# Patient Record
Sex: FEMALE | Race: OTHER | Employment: UNEMPLOYED | URBAN - METROPOLITAN AREA
[De-identification: names, ages, dates, MRNs, and addresses within clinical notes are randomized per-mention and may not be internally consistent; named-entity substitution may affect disease eponyms.]

---

## 2019-03-29 ENCOUNTER — LAB ENCOUNTER (OUTPATIENT)
Dept: LAB | Facility: HOSPITAL | Age: 49
End: 2019-03-29
Attending: ORTHOPAEDIC SURGERY
Payer: COMMERCIAL

## 2019-03-29 DIAGNOSIS — Z01.818 PREOPERATIVE TESTING: ICD-10-CM

## 2019-03-29 LAB
ANTIBODY SCREEN: NEGATIVE
RH BLOOD TYPE: POSITIVE

## 2019-03-29 PROCEDURE — 86901 BLOOD TYPING SEROLOGIC RH(D): CPT

## 2019-03-29 PROCEDURE — 86850 RBC ANTIBODY SCREEN: CPT

## 2019-03-29 PROCEDURE — 36415 COLL VENOUS BLD VENIPUNCTURE: CPT

## 2019-03-29 PROCEDURE — 86900 BLOOD TYPING SEROLOGIC ABO: CPT

## 2019-04-04 ENCOUNTER — HOSPITAL ENCOUNTER (INPATIENT)
Facility: HOSPITAL | Age: 49
LOS: 3 days | Discharge: HOME HEALTH | DRG: 470 | End: 2019-04-07
Attending: ORTHOPAEDIC SURGERY | Admitting: ORTHOPAEDIC SURGERY
Payer: COMMERCIAL

## 2019-04-04 ENCOUNTER — ANESTHESIA EVENT (OUTPATIENT)
Dept: SURGERY | Facility: HOSPITAL | Age: 49
DRG: 470 | End: 2019-04-04
Payer: COMMERCIAL

## 2019-04-04 ENCOUNTER — APPOINTMENT (OUTPATIENT)
Dept: GENERAL RADIOLOGY | Facility: HOSPITAL | Age: 49
DRG: 470 | End: 2019-04-04
Attending: NURSE PRACTITIONER
Payer: COMMERCIAL

## 2019-04-04 ENCOUNTER — ANESTHESIA (OUTPATIENT)
Dept: SURGERY | Facility: HOSPITAL | Age: 49
DRG: 470 | End: 2019-04-04
Payer: COMMERCIAL

## 2019-04-04 DIAGNOSIS — Z01.818 PREOPERATIVE TESTING: Primary | ICD-10-CM

## 2019-04-04 PROBLEM — E66.01 MORBID OBESITY WITH BMI OF 45.0-49.9, ADULT (HCC): Chronic | Status: ACTIVE | Noted: 2019-04-04

## 2019-04-04 PROBLEM — M17.11 ARTHRITIS OF RIGHT KNEE: Status: ACTIVE | Noted: 2019-04-04

## 2019-04-04 PROCEDURE — 73560 X-RAY EXAM OF KNEE 1 OR 2: CPT | Performed by: NURSE PRACTITIONER

## 2019-04-04 PROCEDURE — 99232 SBSQ HOSP IP/OBS MODERATE 35: CPT | Performed by: HOSPITALIST

## 2019-04-04 PROCEDURE — 0SRC0J9 REPLACEMENT OF RIGHT KNEE JOINT WITH SYNTHETIC SUBSTITUTE, CEMENTED, OPEN APPROACH: ICD-10-PCS | Performed by: ORTHOPAEDIC SURGERY

## 2019-04-04 DEVICE — INSERT TIB 5 12MM STRL KNEE R: Type: IMPLANTABLE DEVICE | Site: KNEE | Status: FUNCTIONAL

## 2019-04-04 DEVICE — COMP PAT DOME 32MM 8MM STRL: Type: IMPLANTABLE DEVICE | Site: KNEE | Status: FUNCTIONAL

## 2019-04-04 DEVICE — COMP FEM 5 STRL KNEE R NPOR: Type: IMPLANTABLE DEVICE | Site: KNEE | Status: FUNCTIONAL

## 2019-04-04 DEVICE — BP TIB 5- STRL KNEE R NPOR DJO: Type: IMPLANTABLE DEVICE | Site: KNEE | Status: FUNCTIONAL

## 2019-04-04 DEVICE — BIOMET BC R 1X40 US: Type: IMPLANTABLE DEVICE | Site: KNEE | Status: FUNCTIONAL

## 2019-04-04 DEVICE — TKA CAP, PRIMARY W/O STEM EXT: Type: IMPLANTABLE DEVICE | Status: FUNCTIONAL

## 2019-04-04 RX ORDER — DEXAMETHASONE SODIUM PHOSPHATE 4 MG/ML
VIAL (ML) INJECTION AS NEEDED
Status: DISCONTINUED | OUTPATIENT
Start: 2019-04-04 | End: 2019-04-04 | Stop reason: SURG

## 2019-04-04 RX ORDER — CELECOXIB 200 MG/1
200 CAPSULE ORAL EVERY 12 HOURS
Status: DISCONTINUED | OUTPATIENT
Start: 2019-04-05 | End: 2019-04-07

## 2019-04-04 RX ORDER — HALOPERIDOL 5 MG/ML
0.5 INJECTION INTRAMUSCULAR ONCE AS NEEDED
Status: ACTIVE | OUTPATIENT
Start: 2019-04-04 | End: 2019-04-04

## 2019-04-04 RX ORDER — FAMOTIDINE 20 MG/1
20 TABLET ORAL ONCE
Status: COMPLETED | OUTPATIENT
Start: 2019-04-04 | End: 2019-04-04

## 2019-04-04 RX ORDER — KETOROLAC TROMETHAMINE 30 MG/ML
30 INJECTION, SOLUTION INTRAMUSCULAR; INTRAVENOUS ONCE
Status: DISCONTINUED | OUTPATIENT
Start: 2019-04-04 | End: 2019-04-07

## 2019-04-04 RX ORDER — ACETAMINOPHEN 325 MG/1
650 TABLET ORAL EVERY 6 HOURS PRN
Status: DISCONTINUED | OUTPATIENT
Start: 2019-04-04 | End: 2019-04-07

## 2019-04-04 RX ORDER — ONDANSETRON 2 MG/ML
4 INJECTION INTRAMUSCULAR; INTRAVENOUS EVERY 4 HOURS PRN
Status: DISCONTINUED | OUTPATIENT
Start: 2019-04-04 | End: 2019-04-07

## 2019-04-04 RX ORDER — METOCLOPRAMIDE 10 MG/1
10 TABLET ORAL ONCE
Status: COMPLETED | OUTPATIENT
Start: 2019-04-04 | End: 2019-04-04

## 2019-04-04 RX ORDER — OXYCODONE HCL 10 MG/1
10 TABLET, FILM COATED, EXTENDED RELEASE ORAL ONCE
Status: COMPLETED | OUTPATIENT
Start: 2019-04-04 | End: 2019-04-04

## 2019-04-04 RX ORDER — POLYETHYLENE GLYCOL 3350 17 G/17G
17 POWDER, FOR SOLUTION ORAL DAILY PRN
Status: DISCONTINUED | OUTPATIENT
Start: 2019-04-04 | End: 2019-04-07

## 2019-04-04 RX ORDER — MAGNESIUM HYDROXIDE 1200 MG/15ML
LIQUID ORAL CONTINUOUS PRN
Status: COMPLETED | OUTPATIENT
Start: 2019-04-04 | End: 2019-04-04

## 2019-04-04 RX ORDER — HYDROCODONE BITARTRATE AND ACETAMINOPHEN 7.5; 325 MG/1; MG/1
1 TABLET ORAL EVERY 6 HOURS PRN
Status: DISCONTINUED | OUTPATIENT
Start: 2019-04-04 | End: 2019-04-07

## 2019-04-04 RX ORDER — ZOLPIDEM TARTRATE 5 MG/1
5 TABLET ORAL NIGHTLY PRN
Status: DISCONTINUED | OUTPATIENT
Start: 2019-04-04 | End: 2019-04-07

## 2019-04-04 RX ORDER — CEFAZOLIN SODIUM/WATER 2 G/20 ML
2 SYRINGE (ML) INTRAVENOUS EVERY 8 HOURS
Status: COMPLETED | OUTPATIENT
Start: 2019-04-04 | End: 2019-04-05

## 2019-04-04 RX ORDER — HYDROCODONE BITARTRATE AND ACETAMINOPHEN 5; 325 MG/1; MG/1
1 TABLET ORAL AS NEEDED
Status: DISCONTINUED | OUTPATIENT
Start: 2019-04-04 | End: 2019-04-04 | Stop reason: HOSPADM

## 2019-04-04 RX ORDER — HYDROCODONE BITARTRATE AND ACETAMINOPHEN 10; 325 MG/1; MG/1
1 TABLET ORAL EVERY 4 HOURS PRN
Status: DISCONTINUED | OUTPATIENT
Start: 2019-04-04 | End: 2019-04-07

## 2019-04-04 RX ORDER — MORPHINE SULFATE 2 MG/ML
2 INJECTION, SOLUTION INTRAMUSCULAR; INTRAVENOUS EVERY 2 HOUR PRN
Status: DISCONTINUED | OUTPATIENT
Start: 2019-04-04 | End: 2019-04-07

## 2019-04-04 RX ORDER — METOCLOPRAMIDE HYDROCHLORIDE 5 MG/ML
10 INJECTION INTRAMUSCULAR; INTRAVENOUS EVERY 6 HOURS PRN
Status: ACTIVE | OUTPATIENT
Start: 2019-04-04 | End: 2019-04-06

## 2019-04-04 RX ORDER — HYDROMORPHONE HYDROCHLORIDE 1 MG/ML
0.4 INJECTION, SOLUTION INTRAMUSCULAR; INTRAVENOUS; SUBCUTANEOUS
Status: ACTIVE | OUTPATIENT
Start: 2019-04-04 | End: 2019-04-05

## 2019-04-04 RX ORDER — DIPHENHYDRAMINE HYDROCHLORIDE 50 MG/ML
12.5 INJECTION INTRAMUSCULAR; INTRAVENOUS EVERY 4 HOURS PRN
Status: DISCONTINUED | OUTPATIENT
Start: 2019-04-04 | End: 2019-04-07

## 2019-04-04 RX ORDER — HYDROCODONE BITARTRATE AND ACETAMINOPHEN 5; 325 MG/1; MG/1
2 TABLET ORAL AS NEEDED
Status: DISCONTINUED | OUTPATIENT
Start: 2019-04-04 | End: 2019-04-04 | Stop reason: HOSPADM

## 2019-04-04 RX ORDER — BUPIVACAINE HYDROCHLORIDE 7.5 MG/ML
INJECTION, SOLUTION INTRASPINAL AS NEEDED
Status: DISCONTINUED | OUTPATIENT
Start: 2019-04-04 | End: 2019-04-04 | Stop reason: SURG

## 2019-04-04 RX ORDER — DIPHENHYDRAMINE HCL 25 MG
25 CAPSULE ORAL EVERY 4 HOURS PRN
Status: DISCONTINUED | OUTPATIENT
Start: 2019-04-04 | End: 2019-04-07

## 2019-04-04 RX ORDER — ACETAMINOPHEN 500 MG
1000 TABLET ORAL ONCE
Status: COMPLETED | OUTPATIENT
Start: 2019-04-04 | End: 2019-04-04

## 2019-04-04 RX ORDER — SODIUM PHOSPHATE, DIBASIC AND SODIUM PHOSPHATE, MONOBASIC 7; 19 G/133ML; G/133ML
1 ENEMA RECTAL ONCE AS NEEDED
Status: DISCONTINUED | OUTPATIENT
Start: 2019-04-04 | End: 2019-04-07

## 2019-04-04 RX ORDER — ONDANSETRON 2 MG/ML
INJECTION INTRAMUSCULAR; INTRAVENOUS AS NEEDED
Status: DISCONTINUED | OUTPATIENT
Start: 2019-04-04 | End: 2019-04-04 | Stop reason: SURG

## 2019-04-04 RX ORDER — NALOXONE HYDROCHLORIDE 0.4 MG/ML
0.08 INJECTION, SOLUTION INTRAMUSCULAR; INTRAVENOUS; SUBCUTANEOUS
Status: ACTIVE | OUTPATIENT
Start: 2019-04-04 | End: 2019-04-05

## 2019-04-04 RX ORDER — MORPHINE SULFATE 4 MG/ML
4 INJECTION, SOLUTION INTRAMUSCULAR; INTRAVENOUS EVERY 2 HOUR PRN
Status: DISCONTINUED | OUTPATIENT
Start: 2019-04-04 | End: 2019-04-07

## 2019-04-04 RX ORDER — SODIUM CHLORIDE 9 MG/ML
INJECTION, SOLUTION INTRAVENOUS CONTINUOUS
Status: DISCONTINUED | OUTPATIENT
Start: 2019-04-04 | End: 2019-04-06

## 2019-04-04 RX ORDER — MIDAZOLAM HYDROCHLORIDE 1 MG/ML
INJECTION INTRAMUSCULAR; INTRAVENOUS AS NEEDED
Status: DISCONTINUED | OUTPATIENT
Start: 2019-04-04 | End: 2019-04-04 | Stop reason: SURG

## 2019-04-04 RX ORDER — MORPHINE SULFATE 10 MG/ML
6 INJECTION, SOLUTION INTRAMUSCULAR; INTRAVENOUS EVERY 10 MIN PRN
Status: DISCONTINUED | OUTPATIENT
Start: 2019-04-04 | End: 2019-04-04 | Stop reason: HOSPADM

## 2019-04-04 RX ORDER — SODIUM CHLORIDE, SODIUM LACTATE, POTASSIUM CHLORIDE, CALCIUM CHLORIDE 600; 310; 30; 20 MG/100ML; MG/100ML; MG/100ML; MG/100ML
INJECTION, SOLUTION INTRAVENOUS CONTINUOUS
Status: DISCONTINUED | OUTPATIENT
Start: 2019-04-04 | End: 2019-04-06

## 2019-04-04 RX ORDER — HYDROMORPHONE HYDROCHLORIDE 1 MG/ML
0.6 INJECTION, SOLUTION INTRAMUSCULAR; INTRAVENOUS; SUBCUTANEOUS
Status: ACTIVE | OUTPATIENT
Start: 2019-04-04 | End: 2019-04-05

## 2019-04-04 RX ORDER — CEFAZOLIN SODIUM/WATER 2 G/20 ML
2 SYRINGE (ML) INTRAVENOUS ONCE
Status: COMPLETED | OUTPATIENT
Start: 2019-04-04 | End: 2019-04-04

## 2019-04-04 RX ORDER — SODIUM CHLORIDE 0.9 % (FLUSH) 0.9 %
10 SYRINGE (ML) INJECTION AS NEEDED
Status: DISCONTINUED | OUTPATIENT
Start: 2019-04-04 | End: 2019-04-07

## 2019-04-04 RX ORDER — NALOXONE HYDROCHLORIDE 0.4 MG/ML
80 INJECTION, SOLUTION INTRAMUSCULAR; INTRAVENOUS; SUBCUTANEOUS AS NEEDED
Status: DISCONTINUED | OUTPATIENT
Start: 2019-04-04 | End: 2019-04-04 | Stop reason: HOSPADM

## 2019-04-04 RX ORDER — HYDROCODONE BITARTRATE AND ACETAMINOPHEN 5; 325 MG/1; MG/1
1 TABLET ORAL EVERY 4 HOURS PRN
Status: DISCONTINUED | OUTPATIENT
Start: 2019-04-04 | End: 2019-04-07

## 2019-04-04 RX ORDER — OXYCODONE HCL 10 MG/1
10 TABLET, FILM COATED, EXTENDED RELEASE ORAL EVERY 12 HOURS
Status: DISCONTINUED | OUTPATIENT
Start: 2019-04-04 | End: 2019-04-07

## 2019-04-04 RX ORDER — MORPHINE SULFATE 2 MG/ML
1 INJECTION, SOLUTION INTRAMUSCULAR; INTRAVENOUS EVERY 2 HOUR PRN
Status: DISCONTINUED | OUTPATIENT
Start: 2019-04-04 | End: 2019-04-07

## 2019-04-04 RX ORDER — FAMOTIDINE 20 MG/1
20 TABLET ORAL 2 TIMES DAILY
Status: DISCONTINUED | OUTPATIENT
Start: 2019-04-04 | End: 2019-04-07

## 2019-04-04 RX ORDER — SENNOSIDES 8.6 MG
17.2 TABLET ORAL 2 TIMES DAILY
Status: DISCONTINUED | OUTPATIENT
Start: 2019-04-04 | End: 2019-04-07

## 2019-04-04 RX ORDER — SODIUM CHLORIDE, SODIUM LACTATE, POTASSIUM CHLORIDE, CALCIUM CHLORIDE 600; 310; 30; 20 MG/100ML; MG/100ML; MG/100ML; MG/100ML
INJECTION, SOLUTION INTRAVENOUS CONTINUOUS
Status: DISCONTINUED | OUTPATIENT
Start: 2019-04-04 | End: 2019-04-04 | Stop reason: HOSPADM

## 2019-04-04 RX ORDER — HYDROCODONE BITARTRATE AND ACETAMINOPHEN 10; 325 MG/1; MG/1
2 TABLET ORAL EVERY 4 HOURS PRN
Status: DISCONTINUED | OUTPATIENT
Start: 2019-04-04 | End: 2019-04-07

## 2019-04-04 RX ORDER — HYDROCODONE BITARTRATE AND ACETAMINOPHEN 7.5; 325 MG/1; MG/1
2 TABLET ORAL EVERY 6 HOURS PRN
Status: DISCONTINUED | OUTPATIENT
Start: 2019-04-04 | End: 2019-04-07

## 2019-04-04 RX ORDER — FAMOTIDINE 10 MG/ML
20 INJECTION, SOLUTION INTRAVENOUS 2 TIMES DAILY
Status: DISCONTINUED | OUTPATIENT
Start: 2019-04-04 | End: 2019-04-07

## 2019-04-04 RX ORDER — HALOPERIDOL 5 MG/ML
0.25 INJECTION INTRAMUSCULAR ONCE AS NEEDED
Status: DISCONTINUED | OUTPATIENT
Start: 2019-04-04 | End: 2019-04-04 | Stop reason: HOSPADM

## 2019-04-04 RX ORDER — SCOLOPAMINE TRANSDERMAL SYSTEM 1 MG/1
1 PATCH, EXTENDED RELEASE TRANSDERMAL ONCE
Status: COMPLETED | OUTPATIENT
Start: 2019-04-04 | End: 2019-04-07

## 2019-04-04 RX ORDER — MORPHINE SULFATE 4 MG/ML
4 INJECTION, SOLUTION INTRAMUSCULAR; INTRAVENOUS EVERY 10 MIN PRN
Status: DISCONTINUED | OUTPATIENT
Start: 2019-04-04 | End: 2019-04-04 | Stop reason: HOSPADM

## 2019-04-04 RX ORDER — LIDOCAINE HYDROCHLORIDE 10 MG/ML
INJECTION, SOLUTION EPIDURAL; INFILTRATION; INTRACAUDAL; PERINEURAL AS NEEDED
Status: DISCONTINUED | OUTPATIENT
Start: 2019-04-04 | End: 2019-04-04 | Stop reason: SURG

## 2019-04-04 RX ORDER — ONDANSETRON 2 MG/ML
4 INJECTION INTRAMUSCULAR; INTRAVENOUS ONCE AS NEEDED
Status: DISCONTINUED | OUTPATIENT
Start: 2019-04-04 | End: 2019-04-04 | Stop reason: HOSPADM

## 2019-04-04 RX ORDER — DIPHENHYDRAMINE HCL 25 MG
25 CAPSULE ORAL EVERY 4 HOURS PRN
Status: DISPENSED | OUTPATIENT
Start: 2019-04-04 | End: 2019-04-05

## 2019-04-04 RX ORDER — MORPHINE SULFATE 2 MG/ML
2 INJECTION, SOLUTION INTRAMUSCULAR; INTRAVENOUS EVERY 10 MIN PRN
Status: DISCONTINUED | OUTPATIENT
Start: 2019-04-04 | End: 2019-04-04 | Stop reason: HOSPADM

## 2019-04-04 RX ORDER — BISACODYL 10 MG
10 SUPPOSITORY, RECTAL RECTAL
Status: DISCONTINUED | OUTPATIENT
Start: 2019-04-04 | End: 2019-04-07

## 2019-04-04 RX ORDER — NALBUPHINE HCL 10 MG/ML
2.5 AMPUL (ML) INJECTION EVERY 4 HOURS PRN
Status: DISCONTINUED | OUTPATIENT
Start: 2019-04-04 | End: 2019-04-07

## 2019-04-04 RX ORDER — ONDANSETRON 2 MG/ML
4 INJECTION INTRAMUSCULAR; INTRAVENOUS ONCE AS NEEDED
Status: COMPLETED | OUTPATIENT
Start: 2019-04-04 | End: 2019-04-04

## 2019-04-04 RX ORDER — CELECOXIB 200 MG/1
200 CAPSULE ORAL ONCE
Status: COMPLETED | OUTPATIENT
Start: 2019-04-04 | End: 2019-04-04

## 2019-04-04 RX ORDER — DIPHENHYDRAMINE HYDROCHLORIDE 50 MG/ML
12.5 INJECTION INTRAMUSCULAR; INTRAVENOUS EVERY 4 HOURS PRN
Status: ACTIVE | OUTPATIENT
Start: 2019-04-04 | End: 2019-04-05

## 2019-04-04 RX ORDER — BUPIVACAINE HYDROCHLORIDE 2.5 MG/ML
INJECTION, SOLUTION EPIDURAL; INFILTRATION; INTRACAUDAL AS NEEDED
Status: DISCONTINUED | OUTPATIENT
Start: 2019-04-04 | End: 2019-04-04

## 2019-04-04 RX ORDER — MORPHINE SULFATE 1 MG/ML
INJECTION, SOLUTION EPIDURAL; INTRATHECAL; INTRAVENOUS AS NEEDED
Status: DISCONTINUED | OUTPATIENT
Start: 2019-04-04 | End: 2019-04-04 | Stop reason: SURG

## 2019-04-04 RX ADMIN — LIDOCAINE HYDROCHLORIDE 50 MG: 10 INJECTION, SOLUTION EPIDURAL; INFILTRATION; INTRACAUDAL; PERINEURAL at 08:05:00

## 2019-04-04 RX ADMIN — SODIUM CHLORIDE, SODIUM LACTATE, POTASSIUM CHLORIDE, CALCIUM CHLORIDE: 600; 310; 30; 20 INJECTION, SOLUTION INTRAVENOUS at 07:44:00

## 2019-04-04 RX ADMIN — MORPHINE SULFATE 0.3 MG: 1 INJECTION, SOLUTION EPIDURAL; INTRATHECAL; INTRAVENOUS at 08:04:00

## 2019-04-04 RX ADMIN — CEFAZOLIN SODIUM/WATER 2 G: 2 G/20 ML SYRINGE (ML) INTRAVENOUS at 08:18:00

## 2019-04-04 RX ADMIN — MIDAZOLAM HYDROCHLORIDE 2 MG: 1 INJECTION INTRAMUSCULAR; INTRAVENOUS at 07:42:00

## 2019-04-04 RX ADMIN — LIDOCAINE HYDROCHLORIDE 50 MG: 10 INJECTION, SOLUTION EPIDURAL; INFILTRATION; INTRACAUDAL; PERINEURAL at 07:53:00

## 2019-04-04 RX ADMIN — BUPIVACAINE HYDROCHLORIDE 1.5 ML: 7.5 INJECTION, SOLUTION INTRASPINAL at 08:04:00

## 2019-04-04 RX ADMIN — ONDANSETRON 4 MG: 2 INJECTION INTRAMUSCULAR; INTRAVENOUS at 08:22:00

## 2019-04-04 RX ADMIN — DEXAMETHASONE SODIUM PHOSPHATE 8 MG: 4 MG/ML VIAL (ML) INJECTION at 08:22:00

## 2019-04-04 RX ADMIN — SODIUM CHLORIDE, SODIUM LACTATE, POTASSIUM CHLORIDE, CALCIUM CHLORIDE: 600; 310; 30; 20 INJECTION, SOLUTION INTRAVENOUS at 09:46:00

## 2019-04-04 NOTE — ANESTHESIA PREPROCEDURE EVALUATION
Anesthesia PreOp Note    HPI:     Cee Shaffer is a 47year old female who presents for preoperative consultation requested by: Manjit Colin MD    Date of Surgery: 4/4/2019    Procedure(s):  KNEE TOTAL REPLACEMENT  Indication: degenerative nikita resource strain: Not on file      Food insecurity:        Worry: Not on file        Inability: Not on file      Transportation needs:        Medical: Not on file        Non-medical: Not on file    Tobacco Use      Smoking status: Never Smoker      Smokeles tolerance: good    Neuro/Psych - negative ROS     GI/Hepatic/Renal - negative ROS     Endo/Other - negative ROS   Abdominal              Anesthesia Plan:   ASA:  2  Plan:   Spinal  Post-op Pain Management: Intrathecal narcotics      I have informed Tarah Garcia

## 2019-04-04 NOTE — PLAN OF CARE
MUSCULOSKELETAL - ADULT    • Return mobility to safest level of function Progressing        PAIN - ADULT    • Verbalizes/displays adequate comfort level or patient's stated pain goal Progressing        Patient Centered Care    • Patient preferences are wenceslao

## 2019-04-04 NOTE — PROGRESS NOTES
[facility] [facility]    Progress Note    Marija Mchugh Patient Status:  Inpatient    3/25/1970 MRN [id number]   Location [facility] 4W/SW/SE Attending Reinaldo Ramachandran MD   Hosp Day # 0 PCP No primary care provider on file. RESP AND HEMODYNAMIC STATUS.              Results:   No results found for: WBC, HGB, HCT, PLT, CREATSERUM, BUN, NA, K, CL, CO2, GLU, CA, ALB, ALKPHO, BILT, TP, AST, ALT, PTT, INR, PT, T4F, TSH, TSHREFLEX, MARIA, LIP, GGT, PSA, DDIMER, ESRML, ESRPF, CRP, BNP,

## 2019-04-04 NOTE — ANESTHESIA PROCEDURE NOTES
Spinal Block  Performed by: Tre Mancini, CRNA  Authorized by: Christina Anderson MD     Patient Location:  OR  Start Time:  4/4/2019 7:53 AM  End Time:  4/4/2019 8:04 AM  Site identification: surface landmarks    Reason for Block: at surgeon's re

## 2019-04-04 NOTE — PHYSICAL THERAPY NOTE
PHYSICAL THERAPY KNEE EVALUATION - INPATIENT       Room Number: 407/407-A  Evaluation Date: 4/4/2019  Type of Evaluation: Initial  Physician Order: PT Eval and Treat    Presenting Problem: s/p R TKA  Reason for Therapy: Mobility Dysfunction and Discharge P in preparation for discharge. DISCHARGE RECOMMENDATIONS  PT Discharge Recommendations: 24 hour care/supervision;Home with home health PT;Sub-acute rehabilitation    PLAN  PT Treatment Plan: Bed mobility; Body mechanics; Endurance; Energy conservation;Patie strength are within functional limits     Lower extremity ROM is within functional limits except for the following:   Right Knee extension ~5 deg  Right Knee flexion ~85 deg    Lower extremity strength is within functional limits     BALANCE  Static Sittin walker at [facility], verbal cues for proper hand placement     Exercise/Education Provided:  Bed mobility  Body mechanics  Energy conservation  Gait training  ROM  Lower therapeutic exercise:   Ankle pumps  Heel slides  Knee extension  Quad sets  Transfer trainin

## 2019-04-04 NOTE — OPERATIVE REPORT
[place] [facility] - [facility]    TKA Brief Operative Note    Alexx Hadley Patient Status:  Inpatient    3/25/1970 MRN [id number]   Location [facility] 4W/SW/SE Attending Amber Marc MD     PCP No primary care provider on file.        P

## 2019-04-04 NOTE — ANESTHESIA POSTPROCEDURE EVALUATION
Patient: Michael Proctor    Procedure Summary     Date:  04/04/19 Room / Location:  [address] [place] [address] MAIN OR 12 / [address] [place] [address] MAIN OR    Anesthesia Start:  3848 Anesthesia Stop:  6983    Procedure:  KNEE TOTAL REPLACEMENT (Right ) Diagnosis:  (degenerative joint disease

## 2019-04-04 NOTE — INTERVAL H&P NOTE
Pre-op Diagnosis: degenerative joint disease right knee    The above referenced H&P was reviewed by Debbi Escamilla MD on 4/4/2019, the patient was examined and no significant changes have occurred in the patient's condition since the H&P was performed.   I

## 2019-04-05 PROCEDURE — 99232 SBSQ HOSP IP/OBS MODERATE 35: CPT | Performed by: HOSPITALIST

## 2019-04-05 NOTE — OPERATIVE REPORT
[address] [place] [address] Matthew QUINTANILLA    OPERATIVE REPORT      PATIENT NAME: Pat Collier  MR#: [id number]      DATE OF PROCEDURE: 4/4/2019  PREOPERATIVE DIAGNOSIS: Degenerative Arthritis (e.g., OA) right obtained.  The risks discussed included, but were not limited, to infection, nerve injury, arterial injury, bleeding, deep venous thrombosis (DVT), pulmonary embolus, wear, lysis, need for reoperation, incision numbness, stiffness, loss of limb and loss of removed bony osteophytes off the distal femur and proximal tibia at this time. Once this was done we then marked Birmingham's line on the distal end of the femur and cannulated the femur with a drill.  We suctioned the medullary canal and inserted our first we had an excellent grand piano sign on the distal femur signifying good external rotation of our cutting guide. At this point, we made the posterior condylar, chamfer and trochlear cuts.  Once this was complete, all bony blocks were removed and the trial c this time and held in place with a patella clamp for the cement to cure. During cementation a dilute betadine solution was used to soak the wound.  Once the cement was fully hardened the patella clamp was removed and the knee was taken though a full range o 2-3 nights prior to discharge. BMI 22 Modifier: Katie Coffey had a documented pre-operative BMI of approximately 47.99. Due to this significant morbid obesity the surgical procedure was longer than our standard procedure.  Special retractors we

## 2019-04-05 NOTE — PHYSICAL THERAPY NOTE
PHYSICAL THERAPY KNEE TREATMENT NOTE - INPATIENT     Room Number: 407/407-A             Presenting Problem: s/p R TKA    Problem List  Principal Problem:    Arthritis of right knee  Active Problems:     Morbid obesity with BMI of 45.0-49.9, adult ([address]    P blankets)?: A Little   -   Sitting down on and standing up from a chair with arms (e.g., wheelchair, bedside commode, etc.): A Little   -   Moving from lying on back to sitting on the side of the bed?: A Little   How much help from another person does the negotiate 24 stairs/one curb w/ assistive device and supervision   Goal #4   Current Status Pt navigated 4 stairs with Mod a   Goal #5 R Knee AROM 0 degrees extension to 95 degrees flexion     Goal #5   Current Status In progress   Goal #6 Patient independ

## 2019-04-05 NOTE — OCCUPATIONAL THERAPY NOTE
OCCUPATIONAL THERAPY EVALUATION - INPATIENT      Room Number: 407/407-A  Evaluation Date: 4/5/2019  Type of Evaluation: Initial  Presenting Problem: (R TKA)    Physician Order: IP Consult to Occupational Therapy  Reason for Therapy: ADL/IADL Dysfunction an Spouse    Toilet and Equipment: Standard height toilet           Drives: Yes  Patient Regularly Uses: Glasses    Stairs in Home: 2STE  Assistive Device(s) Used: none     Prior Level of [place]: Prior to admission, patient as independent with self-care indep  Feeding: indep  Bathing: SBA  Toileting: SBA  Upper Body Dressing: SBA  Lower Body Dressing: SBA    Education Provided: Educated patient in role of OT and POC     Patient End of Session: Up in chair;Needs met;Call light within reach;RN aware of sess

## 2019-04-05 NOTE — ANESTHESIA POST-OP FOLLOW-UP NOTE
S/P R TKA,intrathecal duramorph placement  POD # 1,doing well minimal itching  No HA no BA no new neurologic signs or symptoms  Site is clean dry intact  D/c from service

## 2019-04-05 NOTE — PROGRESS NOTES
[facility]    Progress Note    Joshindigo Shafferbatsheva Patient Status:  Inpatient    3/25/1970 MRN [id number]   Location [facility] 4W/SW/SE Attending Nilson Luna MD   Hosp Day # 1 PCP No primary care provider on file.        Ana Oscar Date: 4/4/2019  CONCLUSION:  1. Right knee arthroplasty.     Dictated by (CST): Jessica Parra MD on 4/04/2019 at 11:06     Approved by (CST): Jessica Parra MD on 4/04/2019 at 11:07                  Jayne House MD  [facility]  [phone]

## 2019-04-05 NOTE — PROGRESS NOTES
[facility]    Progress Note    Michael Proctor Patient Status:  Inpatient    3/25/1970 MRN [id number]   Location [facility] [place] 4W/SW/SE Attending Ayush Ferrara MD   Hosp Day # 1 PCP No primary care provider on file. 45.0-49.9, adult ([facility] [address]  MONITOR RESP AND HEMODYNAMIC STATUS.      Dispo--> likely home with home health care        Results:     Lab Results   Component Value Date    WBC 14.5 (H) 04/05/2019    HGB 11.2 (L) 04/05/2019    HCT 36.1 04/05/2019    .0 04/

## 2019-04-05 NOTE — PROGRESS NOTES
[facility]    Progress Note    Georgine Ahumada Patient Status:  Inpatient    3/25/1970 MRN [id number]   Location [facility] 4W/SW/SE Attending Mirian Garcia MD   Hosp Day # 1 PCP No primary care provider on file.        Otilia Gomes

## 2019-04-06 PROCEDURE — 99232 SBSQ HOSP IP/OBS MODERATE 35: CPT | Performed by: HOSPITALIST

## 2019-04-06 RX ORDER — OXYCODONE HCL 10 MG/1
10 TABLET, FILM COATED, EXTENDED RELEASE ORAL EVERY 12 HOURS
Qty: 10 TABLET | Refills: 0 | Status: SHIPPED | OUTPATIENT
Start: 2019-04-06 | End: 2020-08-17

## 2019-04-06 RX ORDER — CELECOXIB 200 MG/1
200 CAPSULE ORAL EVERY 12 HOURS
Qty: 30 CAPSULE | Refills: 0 | Status: SHIPPED | OUTPATIENT
Start: 2019-04-06 | End: 2020-08-17

## 2019-04-06 RX ORDER — HYDROCODONE BITARTRATE AND ACETAMINOPHEN 10; 325 MG/1; MG/1
2 TABLET ORAL EVERY 4 HOURS PRN
Qty: 60 TABLET | Refills: 0 | Status: SHIPPED | OUTPATIENT
Start: 2019-04-06 | End: 2020-08-17

## 2019-04-06 NOTE — PROGRESS NOTES
[facility]    Progress Note    Deborah Mancia Patient Status:  Inpatient    3/25/1970 MRN [id number]   Location [facility] 4W/SW/SE Attending Werner Falk MD   Hosp Day # 2 PCP No primary care provider on file. ([facility] [address]  MONITOR RESP AND HEMODYNAMIC STATUS.      Dispo--> likely home with home health care tomorrow        Results:     Lab Results   Component Value Date    WBC 12.7 (H) 04/06/2019    HGB 10.8 (L) 04/06/2019    HCT 35.3 04/06/2019    .0 04/06/2019

## 2019-04-06 NOTE — PHYSICAL THERAPY NOTE
PHYSICAL THERAPY KNEE TREATMENT NOTE - INPATIENT     Room Number: 407/407-A             Presenting Problem: s/p R TKA    Problem List  Principal Problem:    Arthritis of right knee  Active Problems:     Morbid obesity with BMI of 45.0-49.9, adult ([address]    P Little   -   Sitting down on and standing up from a chair with arms (e.g., wheelchair, bedside commode, etc.): A Little   -   Moving from lying on back to sitting on the side of the bed?: A Little   How much help from another person does the patient lauren #4   Current Status Pt navigated 12  stairs with [place]   Goal #5 R Knee AROM 0 degrees extension to 95 degrees flexion     Goal #5   Current Status In progress   Goal #6 Patient independently performs home exercise program for ROM/strengthening per the in

## 2019-04-06 NOTE — PROGRESS NOTES
[place] [facility] - [facility]    Progress Note    Chichi Jones Patient Status:  Inpatient    3/25/1970 MRN [id number]   Location [facility] 4W/SW/SE Attending Wendy Guillermo MD   Hosp Day # 2 PCP No primary care provider on file. Views), Right (cpt=[id number])    Result Date: 4/4/2019  CONCLUSION:  1. Right knee arthroplasty.     Dictated by (CST): Baudilio Cleary MD on 4/04/2019 at 11:06     Approved by (CST): Baudilio Cleary MD on 4/04/2019 at 11:07                  MD Wilfrid Reece

## 2019-04-06 NOTE — DOWNTIME EVENT NOTE
The EMR was down for 3 hours on 4/6/2019.    n/a was responsible for completing the paper charting during this time period.      The following information was re-entered into the system by Marcio Hagen: [facility]    The following information will remain in the paper

## 2019-04-07 VITALS
RESPIRATION RATE: 18 BRPM | WEIGHT: 254.63 LBS | SYSTOLIC BLOOD PRESSURE: 100 MMHG | BODY MASS INDEX: 45.12 KG/M2 | OXYGEN SATURATION: 96 % | HEIGHT: 63 IN | HEART RATE: 97 BPM | DIASTOLIC BLOOD PRESSURE: 55 MMHG | TEMPERATURE: 99 F

## 2019-04-07 PROCEDURE — 99239 HOSP IP/OBS DSCHRG MGMT >30: CPT | Performed by: HOSPITALIST

## 2019-04-07 NOTE — CM/SW NOTE
SHUKRI informed [facility] of patient's DC today, 4.7.19      Specific [facility] orders have been entered.       Ismael Speaker, SW., [phone]

## 2019-04-07 NOTE — DISCHARGE SUMMARY
[place] [facility] - [facility]    Discharge Summary    [address] Patient Status:  Inpatient    3/25/1970 MRN [id number]   Location [facility] 4W/SW/SE Attending Jonathon Jennings MD   Hosp Day # 3 PCP No primary care provider on file sensory deficit or motor deficit. Skin: Skin is warm and dry. Psychiatric: She has a normal mood and affect. Her behavior is normal.       History of Present Illness:   Pt is a 47 y.o F with PMH of R knee djd who presents for elective TKA.        Hospit Tabs  · oxyCODONE HCl ER 10 MG T12a  · rivaroxaban 10 MG Tabs  · Sennosides 17.2 MG Tabs           Follow-up With  Details  Why  Contact Info   Jeremie Estrada MD  Call in 2 weeks  For wound re-check  [address]     I an

## 2019-04-07 NOTE — PHYSICAL THERAPY NOTE
PHYSICAL THERAPY KNEE TREATMENT NOTE - INPATIENT     Room Number: 407/407-A             Presenting Problem: s/p R TKA    Problem List  Principal Problem:    Arthritis of right knee  Active Problems:     Morbid obesity with BMI of 45.0-49.9, adult ([address]    P None    WEIGHT BEARING STATUS  Weight Bearing Restriction: R lower extremity        R Lower Extremity: Weight Bearing as Tolerated       PAIN ASSESSMENT   Ratin  Location: R knee  Management Techniques: Activity promotion; Body mechanics; Relaxation;Repo assistive device   Goal #1 Patient is able to demonstrate supine - sit EOB @ level: modified independent     Goal #1   Current Status NT    Goal #2 Patient is able to demonstrate transfers Sit to/from Stand at assistance level: modified independent     [facility]

## 2019-04-07 NOTE — PLAN OF CARE
Problem: Patient Centered Care  Goal: Patient preferences are identified and integrated in the patient's plan of care  Description  Interventions:  - What would you like us to know as we care for you?  - Provide timely, complete, and accurate information Progressing     Problem: SKIN/TISSUE INTEGRITY - ADULT  Goal: Incision(s), wounds(s) or drain site(s) healing without S/S of infection  Description  INTERVENTIONS:  - Assess and document risk factors for pressure ulcer development  - Assess and document sk

## 2019-04-07 NOTE — PROGRESS NOTES
[place] [facility] - [facility]    Progress Note    Michael Proctor Patient Status:  Inpatient    3/25/1970 MRN [id number]   Location [facility] 4W/SW/SE Attending Tj Sapp MD   Hosp Day # 3 PCP No primary care provider on file. 26.7   MCHC 31.0 30.6* 31.2   RDW 15.5* 15.9* 15.9*   WBC 14.5* 12.7* 11.3*   .0 290.0 278.0       Recent Labs   Lab 04/05/19  0457   *   BUN 10   CREATSERUM 0.64   GFRAA 120   GFRNAA 104   CA 9.0      K 4.4      CO2 28.0

## 2020-07-31 ENCOUNTER — APPOINTMENT (OUTPATIENT)
Dept: LAB | Facility: HOSPITAL | Age: 50
End: 2020-07-31
Attending: ORTHOPAEDIC SURGERY
Payer: COMMERCIAL

## 2020-07-31 DIAGNOSIS — Z01.818 PREOPERATIVE TESTING: ICD-10-CM

## 2020-07-31 LAB
BILIRUB UR QL: NEGATIVE
COLOR UR: YELLOW
GLUCOSE UR-MCNC: NEGATIVE MG/DL
KETONES UR-MCNC: NEGATIVE MG/DL
NITRITE UR QL STRIP.AUTO: POSITIVE
PH UR: 6 [PH] (ref 5–8)
PROT UR-MCNC: 30 MG/DL
RBC #/AREA URNS AUTO: 5 /HPF
SP GR UR STRIP: 1.02 (ref 1–1.03)
UROBILINOGEN UR STRIP-ACNC: <2
WBC #/AREA URNS AUTO: 17 /HPF

## 2020-07-31 PROCEDURE — 81001 URINALYSIS AUTO W/SCOPE: CPT

## 2020-08-17 RX ORDER — HYDROCODONE BITARTRATE AND ACETAMINOPHEN 10; 325 MG/1; MG/1
1 TABLET ORAL EVERY 6 HOURS PRN
COMMUNITY
End: 2021-06-01

## 2020-08-18 ENCOUNTER — LAB ENCOUNTER (OUTPATIENT)
Dept: LAB | Facility: HOSPITAL | Age: 50
End: 2020-08-18
Attending: ORTHOPAEDIC SURGERY
Payer: COMMERCIAL

## 2020-08-18 DIAGNOSIS — Z01.818 PREOPERATIVE TESTING: ICD-10-CM

## 2020-08-18 LAB
ANTIBODY SCREEN: NEGATIVE
RH BLOOD TYPE: POSITIVE
SARS-COV-2 RNA RESP QL NAA+PROBE: NOT DETECTED

## 2020-08-18 PROCEDURE — 86900 BLOOD TYPING SEROLOGIC ABO: CPT

## 2020-08-18 PROCEDURE — 36415 COLL VENOUS BLD VENIPUNCTURE: CPT

## 2020-08-18 PROCEDURE — 86901 BLOOD TYPING SEROLOGIC RH(D): CPT

## 2020-08-18 PROCEDURE — 86850 RBC ANTIBODY SCREEN: CPT

## 2020-08-19 ENCOUNTER — APPOINTMENT (OUTPATIENT)
Dept: GENERAL RADIOLOGY | Facility: HOSPITAL | Age: 50
DRG: 470 | End: 2020-08-19
Attending: ORTHOPAEDIC SURGERY
Payer: COMMERCIAL

## 2020-08-19 ENCOUNTER — ANESTHESIA EVENT (OUTPATIENT)
Dept: SURGERY | Facility: HOSPITAL | Age: 50
DRG: 470 | End: 2020-08-19
Payer: COMMERCIAL

## 2020-08-19 ENCOUNTER — APPOINTMENT (OUTPATIENT)
Dept: GENERAL RADIOLOGY | Facility: HOSPITAL | Age: 50
DRG: 470 | End: 2020-08-19
Attending: NURSE PRACTITIONER
Payer: COMMERCIAL

## 2020-08-19 ENCOUNTER — ANESTHESIA (OUTPATIENT)
Dept: SURGERY | Facility: HOSPITAL | Age: 50
DRG: 470 | End: 2020-08-19
Payer: COMMERCIAL

## 2020-08-19 ENCOUNTER — HOSPITAL ENCOUNTER (INPATIENT)
Facility: HOSPITAL | Age: 50
LOS: 1 days | Discharge: HOME HEALTH | DRG: 470 | End: 2020-08-20
Attending: ORTHOPAEDIC SURGERY | Admitting: ORTHOPAEDIC SURGERY
Payer: COMMERCIAL

## 2020-08-19 DIAGNOSIS — Z01.818 PREOPERATIVE TESTING: Primary | ICD-10-CM

## 2020-08-19 PROBLEM — Z96.652 S/P TOTAL KNEE ARTHROPLASTY, LEFT: Status: ACTIVE | Noted: 2020-08-19

## 2020-08-19 PROBLEM — M17.12 PRIMARY OSTEOARTHRITIS OF LEFT KNEE: Status: ACTIVE | Noted: 2020-08-19

## 2020-08-19 PROCEDURE — 76000 FLUOROSCOPY <1 HR PHYS/QHP: CPT | Performed by: ORTHOPAEDIC SURGERY

## 2020-08-19 PROCEDURE — 99232 SBSQ HOSP IP/OBS MODERATE 35: CPT | Performed by: HOSPITALIST

## 2020-08-19 PROCEDURE — 0SRD0J9 REPLACEMENT OF LEFT KNEE JOINT WITH SYNTHETIC SUBSTITUTE, CEMENTED, OPEN APPROACH: ICD-10-PCS | Performed by: ORTHOPAEDIC SURGERY

## 2020-08-19 PROCEDURE — 73560 X-RAY EXAM OF KNEE 1 OR 2: CPT | Performed by: NURSE PRACTITIONER

## 2020-08-19 DEVICE — COMP FEM 5 STRL KNEE L NPOR: Type: IMPLANTABLE DEVICE | Site: KNEE | Status: FUNCTIONAL

## 2020-08-19 DEVICE — IMPLANTABLE DEVICE: Type: IMPLANTABLE DEVICE | Status: FUNCTIONAL

## 2020-08-19 DEVICE — COMP PAT DOME 32MM 8MM STRL: Type: IMPLANTABLE DEVICE | Site: KNEE | Status: FUNCTIONAL

## 2020-08-19 DEVICE — REFOBACIN BC R 1X40 US: Type: IMPLANTABLE DEVICE | Status: FUNCTIONAL

## 2020-08-19 RX ORDER — METOCLOPRAMIDE 10 MG/1
10 TABLET ORAL ONCE
Status: COMPLETED | OUTPATIENT
Start: 2020-08-19 | End: 2020-08-19

## 2020-08-19 RX ORDER — POLYETHYLENE GLYCOL 3350 17 G/17G
17 POWDER, FOR SOLUTION ORAL DAILY PRN
Status: DISCONTINUED | OUTPATIENT
Start: 2020-08-19 | End: 2020-08-20

## 2020-08-19 RX ORDER — HYDROMORPHONE HYDROCHLORIDE 1 MG/ML
0.4 INJECTION, SOLUTION INTRAMUSCULAR; INTRAVENOUS; SUBCUTANEOUS
Status: ACTIVE | OUTPATIENT
Start: 2020-08-19 | End: 2020-08-20

## 2020-08-19 RX ORDER — HALOPERIDOL 5 MG/ML
0.5 INJECTION INTRAMUSCULAR ONCE AS NEEDED
Status: ACTIVE | OUTPATIENT
Start: 2020-08-19 | End: 2020-08-19

## 2020-08-19 RX ORDER — MORPHINE SULFATE 2 MG/ML
2 INJECTION, SOLUTION INTRAMUSCULAR; INTRAVENOUS EVERY 2 HOUR PRN
Status: DISCONTINUED | OUTPATIENT
Start: 2020-08-19 | End: 2020-08-20

## 2020-08-19 RX ORDER — MORPHINE SULFATE 4 MG/ML
4 INJECTION, SOLUTION INTRAMUSCULAR; INTRAVENOUS EVERY 10 MIN PRN
Status: DISCONTINUED | OUTPATIENT
Start: 2020-08-19 | End: 2020-08-19 | Stop reason: HOSPADM

## 2020-08-19 RX ORDER — CELECOXIB 200 MG/1
200 CAPSULE ORAL ONCE
Status: COMPLETED | OUTPATIENT
Start: 2020-08-19 | End: 2020-08-19

## 2020-08-19 RX ORDER — MAGNESIUM OXIDE 400 MG (241.3 MG MAGNESIUM) TABLET
3 TABLET NIGHTLY
Status: DISCONTINUED | OUTPATIENT
Start: 2020-08-19 | End: 2020-08-20

## 2020-08-19 RX ORDER — HYDROMORPHONE HYDROCHLORIDE 1 MG/ML
0.6 INJECTION, SOLUTION INTRAMUSCULAR; INTRAVENOUS; SUBCUTANEOUS EVERY 5 MIN PRN
Status: DISCONTINUED | OUTPATIENT
Start: 2020-08-19 | End: 2020-08-19 | Stop reason: HOSPADM

## 2020-08-19 RX ORDER — HYDROCODONE BITARTRATE AND ACETAMINOPHEN 5; 325 MG/1; MG/1
2 TABLET ORAL AS NEEDED
Status: DISCONTINUED | OUTPATIENT
Start: 2020-08-19 | End: 2020-08-19 | Stop reason: HOSPADM

## 2020-08-19 RX ORDER — NALOXONE HYDROCHLORIDE 0.4 MG/ML
80 INJECTION, SOLUTION INTRAMUSCULAR; INTRAVENOUS; SUBCUTANEOUS AS NEEDED
Status: DISCONTINUED | OUTPATIENT
Start: 2020-08-19 | End: 2020-08-19 | Stop reason: HOSPADM

## 2020-08-19 RX ORDER — DEXAMETHASONE SODIUM PHOSPHATE 4 MG/ML
VIAL (ML) INJECTION AS NEEDED
Status: DISCONTINUED | OUTPATIENT
Start: 2020-08-19 | End: 2020-08-19 | Stop reason: SURG

## 2020-08-19 RX ORDER — PROCHLORPERAZINE EDISYLATE 5 MG/ML
5 INJECTION INTRAMUSCULAR; INTRAVENOUS ONCE AS NEEDED
Status: ACTIVE | OUTPATIENT
Start: 2020-08-19 | End: 2020-08-19

## 2020-08-19 RX ORDER — HYDROMORPHONE HYDROCHLORIDE 1 MG/ML
0.4 INJECTION, SOLUTION INTRAMUSCULAR; INTRAVENOUS; SUBCUTANEOUS EVERY 5 MIN PRN
Status: DISCONTINUED | OUTPATIENT
Start: 2020-08-19 | End: 2020-08-19 | Stop reason: HOSPADM

## 2020-08-19 RX ORDER — BUPIVACAINE HYDROCHLORIDE 2.5 MG/ML
INJECTION, SOLUTION EPIDURAL; INFILTRATION; INTRACAUDAL AS NEEDED
Status: DISCONTINUED | OUTPATIENT
Start: 2020-08-19 | End: 2020-08-19 | Stop reason: HOSPADM

## 2020-08-19 RX ORDER — HYDROCODONE BITARTRATE AND ACETAMINOPHEN 5; 325 MG/1; MG/1
1 TABLET ORAL AS NEEDED
Status: DISCONTINUED | OUTPATIENT
Start: 2020-08-19 | End: 2020-08-19 | Stop reason: HOSPADM

## 2020-08-19 RX ORDER — MIDAZOLAM HYDROCHLORIDE 1 MG/ML
INJECTION INTRAMUSCULAR; INTRAVENOUS AS NEEDED
Status: DISCONTINUED | OUTPATIENT
Start: 2020-08-19 | End: 2020-08-19 | Stop reason: SURG

## 2020-08-19 RX ORDER — BUPIVACAINE HYDROCHLORIDE 7.5 MG/ML
INJECTION, SOLUTION INTRASPINAL AS NEEDED
Status: DISCONTINUED | OUTPATIENT
Start: 2020-08-19 | End: 2020-08-19 | Stop reason: SURG

## 2020-08-19 RX ORDER — MORPHINE SULFATE 1 MG/ML
INJECTION, SOLUTION EPIDURAL; INTRATHECAL; INTRAVENOUS AS NEEDED
Status: DISCONTINUED | OUTPATIENT
Start: 2020-08-19 | End: 2020-08-19 | Stop reason: SURG

## 2020-08-19 RX ORDER — DIPHENHYDRAMINE HYDROCHLORIDE 50 MG/ML
12.5 INJECTION INTRAMUSCULAR; INTRAVENOUS ONCE
Status: COMPLETED | OUTPATIENT
Start: 2020-08-19 | End: 2020-08-19

## 2020-08-19 RX ORDER — ACETAMINOPHEN 325 MG/1
650 TABLET ORAL EVERY 6 HOURS PRN
Status: ACTIVE | OUTPATIENT
Start: 2020-08-19 | End: 2020-08-20

## 2020-08-19 RX ORDER — SODIUM CHLORIDE, SODIUM LACTATE, POTASSIUM CHLORIDE, CALCIUM CHLORIDE 600; 310; 30; 20 MG/100ML; MG/100ML; MG/100ML; MG/100ML
INJECTION, SOLUTION INTRAVENOUS CONTINUOUS
Status: DISCONTINUED | OUTPATIENT
Start: 2020-08-19 | End: 2020-08-20

## 2020-08-19 RX ORDER — MORPHINE SULFATE 10 MG/ML
6 INJECTION, SOLUTION INTRAMUSCULAR; INTRAVENOUS EVERY 10 MIN PRN
Status: DISCONTINUED | OUTPATIENT
Start: 2020-08-19 | End: 2020-08-19 | Stop reason: HOSPADM

## 2020-08-19 RX ORDER — FAMOTIDINE 20 MG/1
20 TABLET ORAL ONCE
Status: COMPLETED | OUTPATIENT
Start: 2020-08-19 | End: 2020-08-19

## 2020-08-19 RX ORDER — NALOXONE HYDROCHLORIDE 0.4 MG/ML
0.08 INJECTION, SOLUTION INTRAMUSCULAR; INTRAVENOUS; SUBCUTANEOUS
Status: ACTIVE | OUTPATIENT
Start: 2020-08-19 | End: 2020-08-20

## 2020-08-19 RX ORDER — FAMOTIDINE 10 MG/ML
20 INJECTION, SOLUTION INTRAVENOUS 2 TIMES DAILY
Status: DISCONTINUED | OUTPATIENT
Start: 2020-08-19 | End: 2020-08-20

## 2020-08-19 RX ORDER — ONDANSETRON 2 MG/ML
4 INJECTION INTRAMUSCULAR; INTRAVENOUS EVERY 4 HOURS PRN
Status: DISCONTINUED | OUTPATIENT
Start: 2020-08-19 | End: 2020-08-20

## 2020-08-19 RX ORDER — HYDROCODONE BITARTRATE AND ACETAMINOPHEN 7.5; 325 MG/1; MG/1
2 TABLET ORAL EVERY 6 HOURS PRN
Status: ACTIVE | OUTPATIENT
Start: 2020-08-19 | End: 2020-08-20

## 2020-08-19 RX ORDER — MAGNESIUM HYDROXIDE 1200 MG/15ML
LIQUID ORAL CONTINUOUS PRN
Status: DISCONTINUED | OUTPATIENT
Start: 2020-08-19 | End: 2020-08-19 | Stop reason: HOSPADM

## 2020-08-19 RX ORDER — PROCHLORPERAZINE EDISYLATE 5 MG/ML
5 INJECTION INTRAMUSCULAR; INTRAVENOUS ONCE AS NEEDED
Status: DISCONTINUED | OUTPATIENT
Start: 2020-08-19 | End: 2020-08-19 | Stop reason: HOSPADM

## 2020-08-19 RX ORDER — CELECOXIB 200 MG/1
200 CAPSULE ORAL DAILY
Status: DISCONTINUED | OUTPATIENT
Start: 2020-08-20 | End: 2020-08-20

## 2020-08-19 RX ORDER — SENNOSIDES 8.6 MG
17.2 TABLET ORAL NIGHTLY
Status: DISCONTINUED | OUTPATIENT
Start: 2020-08-19 | End: 2020-08-20

## 2020-08-19 RX ORDER — HYDROMORPHONE HYDROCHLORIDE 1 MG/ML
0.2 INJECTION, SOLUTION INTRAMUSCULAR; INTRAVENOUS; SUBCUTANEOUS EVERY 5 MIN PRN
Status: DISCONTINUED | OUTPATIENT
Start: 2020-08-19 | End: 2020-08-19 | Stop reason: HOSPADM

## 2020-08-19 RX ORDER — SODIUM PHOSPHATE, DIBASIC AND SODIUM PHOSPHATE, MONOBASIC 7; 19 G/133ML; G/133ML
1 ENEMA RECTAL ONCE AS NEEDED
Status: DISCONTINUED | OUTPATIENT
Start: 2020-08-19 | End: 2020-08-20

## 2020-08-19 RX ORDER — MORPHINE SULFATE 4 MG/ML
4 INJECTION, SOLUTION INTRAMUSCULAR; INTRAVENOUS EVERY 2 HOUR PRN
Status: DISCONTINUED | OUTPATIENT
Start: 2020-08-19 | End: 2020-08-20

## 2020-08-19 RX ORDER — ONDANSETRON 2 MG/ML
4 INJECTION INTRAMUSCULAR; INTRAVENOUS ONCE AS NEEDED
Status: ACTIVE | OUTPATIENT
Start: 2020-08-19 | End: 2020-08-19

## 2020-08-19 RX ORDER — SODIUM CHLORIDE, SODIUM LACTATE, POTASSIUM CHLORIDE, CALCIUM CHLORIDE 600; 310; 30; 20 MG/100ML; MG/100ML; MG/100ML; MG/100ML
INJECTION, SOLUTION INTRAVENOUS CONTINUOUS
Status: DISCONTINUED | OUTPATIENT
Start: 2020-08-19 | End: 2020-08-19 | Stop reason: HOSPADM

## 2020-08-19 RX ORDER — HYDROMORPHONE HYDROCHLORIDE 1 MG/ML
0.6 INJECTION, SOLUTION INTRAMUSCULAR; INTRAVENOUS; SUBCUTANEOUS
Status: ACTIVE | OUTPATIENT
Start: 2020-08-19 | End: 2020-08-20

## 2020-08-19 RX ORDER — MORPHINE SULFATE 4 MG/ML
2 INJECTION, SOLUTION INTRAMUSCULAR; INTRAVENOUS EVERY 10 MIN PRN
Status: DISCONTINUED | OUTPATIENT
Start: 2020-08-19 | End: 2020-08-19 | Stop reason: HOSPADM

## 2020-08-19 RX ORDER — HYDROCODONE BITARTRATE AND ACETAMINOPHEN 7.5; 325 MG/1; MG/1
1 TABLET ORAL EVERY 6 HOURS PRN
Status: ACTIVE | OUTPATIENT
Start: 2020-08-19 | End: 2020-08-20

## 2020-08-19 RX ORDER — ONDANSETRON 2 MG/ML
INJECTION INTRAMUSCULAR; INTRAVENOUS AS NEEDED
Status: DISCONTINUED | OUTPATIENT
Start: 2020-08-19 | End: 2020-08-19 | Stop reason: SURG

## 2020-08-19 RX ORDER — ACETAMINOPHEN 500 MG
1000 TABLET ORAL ONCE
Status: COMPLETED | OUTPATIENT
Start: 2020-08-19 | End: 2020-08-19

## 2020-08-19 RX ORDER — FAMOTIDINE 20 MG/1
20 TABLET ORAL 2 TIMES DAILY
Status: DISCONTINUED | OUTPATIENT
Start: 2020-08-19 | End: 2020-08-20

## 2020-08-19 RX ORDER — HYDROCODONE BITARTRATE AND ACETAMINOPHEN 5; 325 MG/1; MG/1
2 TABLET ORAL EVERY 4 HOURS PRN
Status: DISCONTINUED | OUTPATIENT
Start: 2020-08-19 | End: 2020-08-20

## 2020-08-19 RX ORDER — DIPHENHYDRAMINE HYDROCHLORIDE 50 MG/ML
12.5 INJECTION INTRAMUSCULAR; INTRAVENOUS EVERY 4 HOURS PRN
Status: DISCONTINUED | OUTPATIENT
Start: 2020-08-19 | End: 2020-08-20

## 2020-08-19 RX ORDER — BISACODYL 10 MG
10 SUPPOSITORY, RECTAL RECTAL
Status: DISCONTINUED | OUTPATIENT
Start: 2020-08-19 | End: 2020-08-20

## 2020-08-19 RX ORDER — CEFAZOLIN SODIUM/WATER 2 G/20 ML
2 SYRINGE (ML) INTRAVENOUS ONCE
Status: COMPLETED | OUTPATIENT
Start: 2020-08-19 | End: 2020-08-19

## 2020-08-19 RX ORDER — CEFAZOLIN SODIUM/WATER 2 G/20 ML
2 SYRINGE (ML) INTRAVENOUS EVERY 8 HOURS
Status: COMPLETED | OUTPATIENT
Start: 2020-08-19 | End: 2020-08-20

## 2020-08-19 RX ORDER — DIPHENHYDRAMINE HYDROCHLORIDE 50 MG/ML
12.5 INJECTION INTRAMUSCULAR; INTRAVENOUS EVERY 4 HOURS PRN
Status: ACTIVE | OUTPATIENT
Start: 2020-08-19 | End: 2020-08-20

## 2020-08-19 RX ORDER — HYDROCODONE BITARTRATE AND ACETAMINOPHEN 10; 325 MG/1; MG/1
1 TABLET ORAL EVERY 4 HOURS PRN
Status: DISCONTINUED | OUTPATIENT
Start: 2020-08-19 | End: 2020-08-20

## 2020-08-19 RX ORDER — KETOROLAC TROMETHAMINE 15 MG/ML
15 INJECTION, SOLUTION INTRAMUSCULAR; INTRAVENOUS EVERY 6 HOURS
Status: DISCONTINUED | OUTPATIENT
Start: 2020-08-19 | End: 2020-08-19 | Stop reason: HOSPADM

## 2020-08-19 RX ORDER — DIPHENHYDRAMINE HCL 25 MG
25 CAPSULE ORAL EVERY 4 HOURS PRN
Status: DISCONTINUED | OUTPATIENT
Start: 2020-08-19 | End: 2020-08-20

## 2020-08-19 RX ORDER — OXYCODONE HCL 10 MG/1
10 TABLET, FILM COATED, EXTENDED RELEASE ORAL EVERY 12 HOURS
Status: DISCONTINUED | OUTPATIENT
Start: 2020-08-19 | End: 2020-08-20

## 2020-08-19 RX ORDER — CYCLOBENZAPRINE HCL 10 MG
10 TABLET ORAL EVERY 8 HOURS PRN
Status: DISCONTINUED | OUTPATIENT
Start: 2020-08-19 | End: 2020-08-20

## 2020-08-19 RX ORDER — SCOLOPAMINE TRANSDERMAL SYSTEM 1 MG/1
1 PATCH, EXTENDED RELEASE TRANSDERMAL ONCE
Status: DISCONTINUED | OUTPATIENT
Start: 2020-08-19 | End: 2020-08-20

## 2020-08-19 RX ORDER — DIPHENHYDRAMINE HCL 25 MG
25 CAPSULE ORAL EVERY 4 HOURS PRN
Status: DISPENSED | OUTPATIENT
Start: 2020-08-19 | End: 2020-08-20

## 2020-08-19 RX ORDER — LIDOCAINE HYDROCHLORIDE 10 MG/ML
INJECTION, SOLUTION EPIDURAL; INFILTRATION; INTRACAUDAL; PERINEURAL AS NEEDED
Status: DISCONTINUED | OUTPATIENT
Start: 2020-08-19 | End: 2020-08-19 | Stop reason: SURG

## 2020-08-19 RX ORDER — SODIUM CHLORIDE 9 MG/ML
INJECTION, SOLUTION INTRAVENOUS CONTINUOUS
Status: DISCONTINUED | OUTPATIENT
Start: 2020-08-19 | End: 2020-08-20

## 2020-08-19 RX ORDER — HYDROCODONE BITARTRATE AND ACETAMINOPHEN 10; 325 MG/1; MG/1
2 TABLET ORAL EVERY 4 HOURS PRN
Status: DISCONTINUED | OUTPATIENT
Start: 2020-08-19 | End: 2020-08-20

## 2020-08-19 RX ORDER — ACETAMINOPHEN 325 MG/1
650 TABLET ORAL EVERY 4 HOURS PRN
Status: DISCONTINUED | OUTPATIENT
Start: 2020-08-19 | End: 2020-08-20

## 2020-08-19 RX ORDER — MORPHINE SULFATE 2 MG/ML
1 INJECTION, SOLUTION INTRAMUSCULAR; INTRAVENOUS EVERY 2 HOUR PRN
Status: DISCONTINUED | OUTPATIENT
Start: 2020-08-19 | End: 2020-08-20

## 2020-08-19 RX ORDER — OXYCODONE HCL 10 MG/1
10 TABLET, FILM COATED, EXTENDED RELEASE ORAL ONCE
Status: COMPLETED | OUTPATIENT
Start: 2020-08-19 | End: 2020-08-19

## 2020-08-19 RX ORDER — ONDANSETRON 2 MG/ML
4 INJECTION INTRAMUSCULAR; INTRAVENOUS ONCE AS NEEDED
Status: DISCONTINUED | OUTPATIENT
Start: 2020-08-19 | End: 2020-08-19 | Stop reason: HOSPADM

## 2020-08-19 RX ORDER — DOCUSATE SODIUM 100 MG/1
100 CAPSULE, LIQUID FILLED ORAL 2 TIMES DAILY
Status: DISCONTINUED | OUTPATIENT
Start: 2020-08-19 | End: 2020-08-20

## 2020-08-19 RX ORDER — HYDROCODONE BITARTRATE AND ACETAMINOPHEN 5; 325 MG/1; MG/1
1 TABLET ORAL EVERY 4 HOURS PRN
Status: DISCONTINUED | OUTPATIENT
Start: 2020-08-19 | End: 2020-08-20

## 2020-08-19 RX ADMIN — ONDANSETRON 4 MG: 2 INJECTION INTRAMUSCULAR; INTRAVENOUS at 08:30:00

## 2020-08-19 RX ADMIN — DEXAMETHASONE SODIUM PHOSPHATE 4 MG: 4 MG/ML VIAL (ML) INJECTION at 08:30:00

## 2020-08-19 RX ADMIN — SODIUM CHLORIDE, SODIUM LACTATE, POTASSIUM CHLORIDE, CALCIUM CHLORIDE: 600; 310; 30; 20 INJECTION, SOLUTION INTRAVENOUS at 10:23:00

## 2020-08-19 RX ADMIN — CEFAZOLIN SODIUM/WATER 2 G: 2 G/20 ML SYRINGE (ML) INTRAVENOUS at 08:40:00

## 2020-08-19 RX ADMIN — MORPHINE SULFATE 0.3 MG: 1 INJECTION, SOLUTION EPIDURAL; INTRATHECAL; INTRAVENOUS at 08:28:00

## 2020-08-19 RX ADMIN — LIDOCAINE HYDROCHLORIDE 30 MG: 10 INJECTION, SOLUTION EPIDURAL; INFILTRATION; INTRACAUDAL; PERINEURAL at 08:28:00

## 2020-08-19 RX ADMIN — BUPIVACAINE HYDROCHLORIDE 1.5 ML: 7.5 INJECTION, SOLUTION INTRASPINAL at 08:28:00

## 2020-08-19 RX ADMIN — SODIUM CHLORIDE, SODIUM LACTATE, POTASSIUM CHLORIDE, CALCIUM CHLORIDE: 600; 310; 30; 20 INJECTION, SOLUTION INTRAVENOUS at 11:01:00

## 2020-08-19 RX ADMIN — MIDAZOLAM HYDROCHLORIDE 2 MG: 1 INJECTION INTRAMUSCULAR; INTRAVENOUS at 08:24:00

## 2020-08-19 NOTE — ANESTHESIA PROCEDURE NOTES
Spinal Block  Performed by: Maria Antonia Mayer CRNA  Authorized by: Сергей Carmen MD       General Information and Staff    Start Time:  8/19/2020 9:24 AM  End Time:  8/19/2020 9:28 AM  Anesthesiologist:  Сергей Carmen MD  CRNA:  Maria Antonia Mayer CRNA

## 2020-08-19 NOTE — PHYSICAL THERAPY NOTE
PHYSICAL THERAPY KNEE EVALUATION - INPATIENT       Room Number: 418/418-A  Evaluation Date: 8/19/2020  Type of Evaluation: Initial  Physician Order: PT Eval and Treat    Presenting Problem: L TKA  Reason for Therapy: Mobility Dysfunction and Discharge Plan and chair alarm activated. Future sessions to focus on stair negotiation as patient has 20 stairs to enter apartment.  The patient's Approx Degree of Impairment: 46.58% has been calculated based on documentation in the [facility] '6 clicks' Inpatient Basic M utilizing RW prior to admission for approx a week due to pain. SUBJECTIVE  \"wow, I'm walking. This feels good! \"    PHYSICAL THERAPY EXAMINATION     OBJECTIVE  Precautions: Limb alert - left  Fall Risk: Standard fall risk    WEIGHT BEARING RESTRICTION assist  Distance (ft): 150ft  Assistive Device: Rolling walker  Pattern: L Decreased stance time  Stoop/Curb Assistance: Not tested     Exercise/Education Provided:  Bed mobility  Body mechanics  Energy conservation  Functional activity tolerated  Gait tra

## 2020-08-19 NOTE — OPERATIVE REPORT
[facility]    TKA Brief Operative Note    Suhasmary ann Bo Patient Status:  Inpatient    3/25/1970 MRN [id number]   Location [facility] Attending Alie Arellano MD     PCP No primary care provider on file.

## 2020-08-19 NOTE — ANESTHESIA POSTPROCEDURE EVALUATION
Patient: Katie Coffey    Procedure Summary     Date:  08/19/20 Room / Location:  [address] [place] [address] MAIN OR 06 / [address] [place] [address] MAIN OR    Anesthesia Start:  0820 Anesthesia Stop:  1101    Procedure:  KNEE TOTAL REPLACEMENT (Left Knee) Diagnosis:  (degenerative joint dise

## 2020-08-19 NOTE — PROGRESS NOTES
[facility] - [facility]    Progress Note    Amanda Ritchie Patient Status:  Inpatient    3/25/1970 MRN [id number]   Location [address] [facility] Attending Jacques Cuevas MD   Hosp Day # 0 PCP No primary care provid with BMI of 45.0-49.9, adult ([facility])  MONITOR RESPIRATORY AND  HEMODYNAMIC STATUS.              Results:     Lab Results   Component Value Date    WBC 11.3 (H) 04/07/2019    HGB 9.8 (L) 04/07/2019    HCT 31.4 (L) 04/07/2019    .0 04/07/2019    CREURI

## 2020-08-19 NOTE — H&P
History & Physical Examination    Patient Name: Lyssa Schneider  MRN: [id number]  [facility]: [id number]  YOB: 1970    Diagnosis: L knee DJD    Present Illness: Lyssa Schneider is a 45year old yo female with a history of Lknee end sta Age of Onset   • Arthritis Mother      Social History    Tobacco Use      Smoking status: Never Smoker      Smokeless tobacco: Never Used    Alcohol use: No      Frequency: Never      SYSTEM Check if Review is Normal Check if Physical Exam is Normal If not

## 2020-08-19 NOTE — OPERATIVE REPORT
[address]  [address] [place] [address] Matthew QUINTANILLA    OPERATIVE REPORT        PATIENT NAME: Tawana Hampton  MR#: [id number]      DATE OF PROCEDURE: 8/19/2020  PREOPERATIVE DIAGNOSIS: Degenerative Arthritis (e.g., OA) left severe degenerative joint disease of the left knee. She was indicated for a total knee arthroplasty based upon these clinical and radiographic findings. We reviewed the risks, benefits and alternatives to the procedure and informed consent was obtained.  Victor Hugo Raines Parapatellar arthrotomy was performed at this time using a Bovie cautery. We carried this distally onto the proximal tibia, releasing the anterior horn of the medial meniscus.  We then debrided the anterior horns of the medial and lateral menisci, the anter extension and good alignment of the cuts based upon the drop gregorio were achieved.  The femur was then sized to be a size 5 and size the tibia to be a size 5 and we drilled holes at approximately 3 degrees of external rotation into the distal femur based on th due to the patients weight and bone quality. All trial components were removed and we opened up the final implants. The knee was soaked with a dilute TXA solution while the implants were being open and cement mixed. The wound was irrigated with pulsa time for a total of 78 minutes. A hemovac drain was not required. At this time an Ace bandage wrapped from toe to thigh. All needle and sponge counts were correct prior to leaving the operating room.  The patient was aroused in the operating room and taken qualified surgical assistant. I was present for all critical portions of the surgery and a backup surgeon was available at all times in case of emergency.         DICTATED BY: Nancy Augustine  DATE: 2020-08-19  SIGNED: 2020-08-19  DISTRIBUTION LIST:   Twilla Kawasaki

## 2020-08-19 NOTE — ANESTHESIA PREPROCEDURE EVALUATION
Anesthesia PreOp Note    HPI:     Ulysses Parisian is a 45year old female who presents for preoperative consultation requested by: Nubia Phillip MD    Date of Surgery: 8/19/2020    Procedure(s):  KNEE TOTAL REPLACEMENT  Indication: degenerative j USE), 1,000 mg, Irrigation, Once (Intra-Op), Fabian Argueta MD    No current [facility]-ordered outpatient medications on file.       No Known Allergies    Family History   Problem Relation Age of Onset   • Arthritis Mother      Social History    Socioeconomic oxygen saturation is 97%.     08/17/20  1405 08/19/20  0608   BP:  127/71   Pulse:  108   Resp:  20   Temp:  97.9 °F (36.6 °C)   TempSrc:  Oral   SpO2:  97%   Weight: 108.9 kg (240 lb) 115.2 kg (254 lb)   Height: 1.549 m (5' 1\") 1.549 m (5' 1\")        Ane

## 2020-08-19 NOTE — PLAN OF CARE
Problem: Patient Centered Care  Goal: Patient preferences are identified and integrated in the patient's plan of care  Description  Interventions:  - What would you like us to know as we care for you?   - Provide timely, complete, and accurate informatio for assistance with activity based on assessment  - Modify environment to reduce risk of injury  - Provide assistive devices as appropriate  - Consider OT/PT consult to assist with strengthening/mobility  - Encourage toileting schedule  Outcome: Progressin bilaterally. Regular diet. Roberto catheter in place. No complaints of pain. Dressing to knee C/D/I. Fluids infusing well. Benadryl given for itching. She is up with 1 assist and the walker. Plan for her is to go home with home health care when cleared.  Call

## 2020-08-20 VITALS
HEIGHT: 61 IN | WEIGHT: 254 LBS | BODY MASS INDEX: 47.95 KG/M2 | OXYGEN SATURATION: 96 % | HEART RATE: 107 BPM | RESPIRATION RATE: 18 BRPM | DIASTOLIC BLOOD PRESSURE: 75 MMHG | SYSTOLIC BLOOD PRESSURE: 157 MMHG | TEMPERATURE: 98 F

## 2020-08-20 LAB
ALBUMIN SERPL-MCNC: 2.6 G/DL (ref 3.4–5)
ALBUMIN/GLOB SERPL: 0.7 {RATIO} (ref 1–2)
ALP LIVER SERPL-CCNC: 74 U/L (ref 41–108)
ALT SERPL-CCNC: 36 U/L (ref 13–56)
ANION GAP SERPL CALC-SCNC: 5 MMOL/L (ref 0–18)
AST SERPL-CCNC: 12 U/L (ref 15–37)
BASOPHILS # BLD AUTO: 0.02 X10(3) UL (ref 0–0.2)
BASOPHILS NFR BLD AUTO: 0.2 %
BILIRUB SERPL-MCNC: 0.4 MG/DL (ref 0.1–2)
BUN BLD-MCNC: 9 MG/DL (ref 7–18)
BUN/CREAT SERPL: 17.3 (ref 10–20)
CALCIUM BLD-MCNC: 8 MG/DL (ref 8.5–10.1)
CHLORIDE SERPL-SCNC: 110 MMOL/L (ref 98–112)
CO2 SERPL-SCNC: 26 MMOL/L (ref 21–32)
CREAT BLD-MCNC: 0.52 MG/DL (ref 0.55–1.02)
DEPRECATED RDW RBC AUTO: 49.3 FL (ref 35.1–46.3)
EOSINOPHIL # BLD AUTO: 0.06 X10(3) UL (ref 0–0.7)
EOSINOPHIL NFR BLD AUTO: 0.5 %
ERYTHROCYTE [DISTWIDTH] IN BLOOD BY AUTOMATED COUNT: 15.2 % (ref 11–15)
GLOBULIN PLAS-MCNC: 3.5 G/DL (ref 2.8–4.4)
GLUCOSE BLD-MCNC: 124 MG/DL (ref 70–99)
HCT VFR BLD AUTO: 31.1 % (ref 35–48)
HGB BLD-MCNC: 9.8 G/DL (ref 12–16)
IMM GRANULOCYTES # BLD AUTO: 0.04 X10(3) UL (ref 0–1)
IMM GRANULOCYTES NFR BLD: 0.3 %
LYMPHOCYTES # BLD AUTO: 1.53 X10(3) UL (ref 1–4)
LYMPHOCYTES NFR BLD AUTO: 13.3 %
M PROTEIN MFR SERPL ELPH: 6.1 G/DL (ref 6.4–8.2)
MCH RBC QN AUTO: 27.9 PG (ref 26–34)
MCHC RBC AUTO-ENTMCNC: 31.5 G/DL (ref 31–37)
MCV RBC AUTO: 88.6 FL (ref 80–100)
MONOCYTES # BLD AUTO: 0.81 X10(3) UL (ref 0.1–1)
MONOCYTES NFR BLD AUTO: 7 %
NEUTROPHILS # BLD AUTO: 9.05 X10 (3) UL (ref 1.5–7.7)
NEUTROPHILS # BLD AUTO: 9.05 X10(3) UL (ref 1.5–7.7)
NEUTROPHILS NFR BLD AUTO: 78.7 %
OSMOLALITY SERPL CALC.SUM OF ELEC: 292 MOSM/KG (ref 275–295)
PLATELET # BLD AUTO: 278 10(3)UL (ref 150–450)
POTASSIUM SERPL-SCNC: 4 MMOL/L (ref 3.5–5.1)
RBC # BLD AUTO: 3.51 X10(6)UL (ref 3.8–5.3)
SODIUM SERPL-SCNC: 141 MMOL/L (ref 136–145)
WBC # BLD AUTO: 11.5 X10(3) UL (ref 4–11)

## 2020-08-20 PROCEDURE — 99239 HOSP IP/OBS DSCHRG MGMT >30: CPT | Performed by: HOSPITALIST

## 2020-08-20 NOTE — CM/SW NOTE
SW received MDO for s/p joint. SHUKRI met with pt to complete an initial assessment. SW confirmed pt's address and phone number with the pt. Pt lives in a 1 level  apartment with spouse. There is/are 14+ steps into the home and 14+ steps to the bedrooms.  Pt st

## 2020-08-20 NOTE — OCCUPATIONAL THERAPY NOTE
OCCUPATIONAL THERAPY EVALUATION - INPATIENT     Room Number: 418/418-A  Evaluation Date: 8/20/2020  Type of Evaluation: Quick Eval  Presenting Problem: (OA of L knee)    Physician Order: IP Consult to Occupational Therapy  Reason for Therapy: ADL/IADL Dy BMI of 45.0-49.9, adult (HCC)    S/P total knee arthroplasty, left      Past Medical History  Past Medical History:   Diagnosis Date   • Depression     no longer taking meds    • History of blood transfusion     25 yrs ago;no reactions   • Osteoarthritis functional limits        ACTIVITIES OF DAILY LIVING ASSESSMENT  AM-PAC ‘6-Clicks’ Inpatient Daily Activity Short Form  How much help from another person does the patient currently need…  -   Putting on and taking off regular lower body clothing?: A Little demonstrate safety with ADLS  SUMMER Riddle OTR/L  KOBE VALENCIA [facility]   #[id number]

## 2020-08-20 NOTE — PLAN OF CARE
A&Ox4, Thai speaking but can converse in [place]. POD 0-1.  Up 1x RW, WBAT LLE. Gel ice to L knee. José [email or web address]. Pardeep in place, plan for removal in AM.  Benadryl given for itchiness.   SCD and teds to LLE with Xarelto starting in AM for VTE prophylaxis injury  Description  INTERVENTIONS:  - Assess pt frequently for physical needs  - Identify cognitive and physical deficits and behaviors that affect risk of falls.   - [place] fall precautions as indicated by assessment.  - Educate pt/family on patient sa (call light)  - Provide an  as needed  - Communicate barriers and strategies to overcome with those who interact with patient  Outcome: Progressing

## 2020-08-20 NOTE — ANESTHESIA POST-OP FOLLOW-UP NOTE
[facility]   Acute Pain Rounds Note  2020    Patient name: Mike Peterson 45year old female  : 3/25/1970  MRN: [id number]    Diagnosis: Preoperative testing  (primary encounter diagnosis)    S/P: TKA    Pain modality: Tonie Hodges

## 2020-08-20 NOTE — DISCHARGE SUMMARY
[place] [facility] - [facility]    Discharge Summary    [address] Patient Status:  Inpatient    3/25/1970 MRN [id number]   Location [facility] 4W/SW/SE Attending Lashawn Cates, [facility] Day # 1 PCP No primary care provider on file. pre-op.   Discharged to home with Nay Hinds in good condition.      Hx of morbid obesity with BMI of 45.0-49.9, adult [facility])      Discharge Condition:  Good    Discharge Medications:      Discharge Medications      CONTINUE taking these medications      Instructio

## 2020-08-20 NOTE — PHYSICAL THERAPY NOTE
PHYSICAL THERAPY TREATMENT NOTE - INPATIENT     Room Number: 044/605-W       Presenting Problem: L TKA    Problem List  Principal Problem:    Primary osteoarthritis of left knee  Active Problems:     Morbid obesity with BMI of 45.0-49.9, adult (HCC)    S/P Static Sitting: Normal  Dynamic Sitting: Normal           Static Standing: Fair +  Dynamic Standing: Fair +    ACTIVITY TOLERANCE  Pulse: 85  Heart Rate Source: Monitor  Resp: 18  BP: 93/57  BP Location: Right arm  BP Method: Aut demonstrate transfers Sit to/from Stand at assistance level: modified independent     Goal #2  Current Status SBA   Goal #3 Patient is able to ambulate 300 feet with assistive device at assistance level: Supervision   Goal #3   Current Status 300 ft with R

## 2020-08-20 NOTE — PLAN OF CARE
Problem: PAIN - ADULT  Goal: Verbalizes/displays adequate comfort level or patient's stated pain goal  Description  INTERVENTIONS:  - Encourage pt to monitor pain and request assistance  - Assess pain using appropriate pain scale  - Administer analgesics resources and transportation as appropriate  - Identify discharge learning needs (meds, wound care, etc)  - Arrange for interpreters to assist at discharge as needed  - Consider post-discharge preferences of patient/family/discharge partner  - Complete CHAY

## 2021-06-01 ENCOUNTER — LAB ENCOUNTER (OUTPATIENT)
Dept: LAB | Age: 51
End: 2021-06-01
Attending: ORTHOPAEDIC SURGERY
Payer: COMMERCIAL

## 2021-06-01 DIAGNOSIS — Z01.818 PREOP TESTING: ICD-10-CM

## 2021-06-01 DIAGNOSIS — Z01.818 PREOPERATIVE TESTING: ICD-10-CM

## 2021-06-01 PROCEDURE — 86901 BLOOD TYPING SEROLOGIC RH(D): CPT

## 2021-06-01 PROCEDURE — 86850 RBC ANTIBODY SCREEN: CPT

## 2021-06-01 PROCEDURE — 36415 COLL VENOUS BLD VENIPUNCTURE: CPT

## 2021-06-01 PROCEDURE — 86900 BLOOD TYPING SEROLOGIC ABO: CPT

## 2021-06-01 PROCEDURE — 87641 MR-STAPH DNA AMP PROBE: CPT

## 2021-06-01 RX ORDER — TOPIRAMATE 50 MG/1
50 TABLET, FILM COATED ORAL NIGHTLY
COMMUNITY

## 2021-06-01 RX ORDER — METOCLOPRAMIDE 10 MG/1
10 TABLET ORAL ONCE
Status: CANCELLED | OUTPATIENT
Start: 2021-06-01 | End: 2021-06-01

## 2021-06-01 RX ORDER — OXYBUTYNIN CHLORIDE 5 MG/1
5 TABLET, EXTENDED RELEASE ORAL DAILY
COMMUNITY

## 2021-06-01 RX ORDER — BUPROPION HYDROCHLORIDE 150 MG/1
150 TABLET, EXTENDED RELEASE ORAL NIGHTLY
COMMUNITY

## 2021-06-01 RX ORDER — MELATONIN 10 MG
CAPSULE ORAL NIGHTLY
COMMUNITY

## 2021-06-01 RX ORDER — TRAMADOL HYDROCHLORIDE 50 MG/1
50 TABLET ORAL EVERY 6 HOURS PRN
COMMUNITY

## 2021-06-02 ENCOUNTER — ANESTHESIA EVENT (OUTPATIENT)
Dept: SURGERY | Facility: HOSPITAL | Age: 51
DRG: 470 | End: 2021-06-02
Payer: COMMERCIAL

## 2021-06-03 ENCOUNTER — ANESTHESIA (OUTPATIENT)
Dept: SURGERY | Facility: HOSPITAL | Age: 51
DRG: 470 | End: 2021-06-03
Payer: COMMERCIAL

## 2021-06-03 ENCOUNTER — HOSPITAL ENCOUNTER (INPATIENT)
Facility: HOSPITAL | Age: 51
LOS: 3 days | Discharge: HOME HEALTH | DRG: 470 | End: 2021-06-06
Attending: ORTHOPAEDIC SURGERY | Admitting: ORTHOPAEDIC SURGERY
Payer: COMMERCIAL

## 2021-06-03 ENCOUNTER — APPOINTMENT (OUTPATIENT)
Dept: GENERAL RADIOLOGY | Facility: HOSPITAL | Age: 51
DRG: 470 | End: 2021-06-03
Attending: NURSE PRACTITIONER
Payer: COMMERCIAL

## 2021-06-03 DIAGNOSIS — Z01.818 PREOP TESTING: Primary | ICD-10-CM

## 2021-06-03 PROBLEM — M16.11 PRIMARY OSTEOARTHRITIS OF RIGHT HIP: Status: ACTIVE | Noted: 2021-06-03

## 2021-06-03 PROBLEM — Z96.641 S/P HIP REPLACEMENT, RIGHT: Status: ACTIVE | Noted: 2021-06-03

## 2021-06-03 PROBLEM — E66.01 MORBID OBESITY WITH BMI OF 40.0-44.9, ADULT (HCC): Status: ACTIVE | Noted: 2019-04-04

## 2021-06-03 PROCEDURE — 0SR90JA REPLACEMENT OF RIGHT HIP JOINT WITH SYNTHETIC SUBSTITUTE, UNCEMENTED, OPEN APPROACH: ICD-10-PCS | Performed by: ORTHOPAEDIC SURGERY

## 2021-06-03 PROCEDURE — 72170 X-RAY EXAM OF PELVIS: CPT | Performed by: NURSE PRACTITIONER

## 2021-06-03 PROCEDURE — 99232 SBSQ HOSP IP/OBS MODERATE 35: CPT | Performed by: HOSPITALIST

## 2021-06-03 DEVICE — IMPLANTABLE DEVICE
Type: IMPLANTABLE DEVICE | Site: HIP | Status: FUNCTIONAL
Brand: WAGNER CONE PROSTHESIS®

## 2021-06-03 DEVICE — IMPLANTABLE DEVICE: Type: IMPLANTABLE DEVICE | Site: HIP | Status: FUNCTIONAL

## 2021-06-03 DEVICE — IMPLANTABLE DEVICE
Type: IMPLANTABLE DEVICE | Site: HIP | Status: FUNCTIONAL
Brand: G7® VIVACIT-E®

## 2021-06-03 DEVICE — BIOLOX® DELTA, CERAMIC FEMORAL HEAD, M, Ø 32/0, TAPER 12/14
Type: IMPLANTABLE DEVICE | Site: HIP | Status: FUNCTIONAL
Brand: BIOLOX® DELTA

## 2021-06-03 RX ORDER — OXYCODONE HCL 10 MG/1
10 TABLET, FILM COATED, EXTENDED RELEASE ORAL ONCE
Status: COMPLETED | OUTPATIENT
Start: 2021-06-03 | End: 2021-06-03

## 2021-06-03 RX ORDER — HYDROMORPHONE HYDROCHLORIDE 1 MG/ML
0.2 INJECTION, SOLUTION INTRAMUSCULAR; INTRAVENOUS; SUBCUTANEOUS EVERY 5 MIN PRN
Status: DISCONTINUED | OUTPATIENT
Start: 2021-06-03 | End: 2021-06-03 | Stop reason: HOSPADM

## 2021-06-03 RX ORDER — ONDANSETRON 2 MG/ML
4 INJECTION INTRAMUSCULAR; INTRAVENOUS ONCE AS NEEDED
Status: ACTIVE | OUTPATIENT
Start: 2021-06-03 | End: 2021-06-03

## 2021-06-03 RX ORDER — DOCUSATE SODIUM 100 MG/1
100 CAPSULE, LIQUID FILLED ORAL 2 TIMES DAILY
Status: DISCONTINUED | OUTPATIENT
Start: 2021-06-03 | End: 2021-06-06

## 2021-06-03 RX ORDER — TOPIRAMATE 25 MG/1
50 TABLET ORAL NIGHTLY
Status: DISCONTINUED | OUTPATIENT
Start: 2021-06-03 | End: 2021-06-06

## 2021-06-03 RX ORDER — HYDROCODONE BITARTRATE AND ACETAMINOPHEN 10; 325 MG/1; MG/1
2 TABLET ORAL EVERY 4 HOURS PRN
Status: DISCONTINUED | OUTPATIENT
Start: 2021-06-03 | End: 2021-06-06

## 2021-06-03 RX ORDER — ACETAMINOPHEN 325 MG/1
650 TABLET ORAL EVERY 6 HOURS PRN
Status: DISCONTINUED | OUTPATIENT
Start: 2021-06-03 | End: 2021-06-06

## 2021-06-03 RX ORDER — HYDROCODONE BITARTRATE AND ACETAMINOPHEN 5; 325 MG/1; MG/1
1 TABLET ORAL AS NEEDED
Status: DISCONTINUED | OUTPATIENT
Start: 2021-06-03 | End: 2021-06-03 | Stop reason: HOSPADM

## 2021-06-03 RX ORDER — SODIUM CHLORIDE 9 MG/ML
INJECTION, SOLUTION INTRAVENOUS CONTINUOUS
Status: DISCONTINUED | OUTPATIENT
Start: 2021-06-03 | End: 2021-06-06

## 2021-06-03 RX ORDER — MORPHINE SULFATE 10 MG/ML
6 INJECTION, SOLUTION INTRAMUSCULAR; INTRAVENOUS EVERY 10 MIN PRN
Status: DISCONTINUED | OUTPATIENT
Start: 2021-06-03 | End: 2021-06-03 | Stop reason: HOSPADM

## 2021-06-03 RX ORDER — NALOXONE HYDROCHLORIDE 0.4 MG/ML
80 INJECTION, SOLUTION INTRAMUSCULAR; INTRAVENOUS; SUBCUTANEOUS AS NEEDED
Status: DISCONTINUED | OUTPATIENT
Start: 2021-06-03 | End: 2021-06-03 | Stop reason: HOSPADM

## 2021-06-03 RX ORDER — CYCLOBENZAPRINE HCL 10 MG
10 TABLET ORAL EVERY 8 HOURS PRN
Status: DISCONTINUED | OUTPATIENT
Start: 2021-06-03 | End: 2021-06-06

## 2021-06-03 RX ORDER — ONDANSETRON 2 MG/ML
4 INJECTION INTRAMUSCULAR; INTRAVENOUS EVERY 4 HOURS PRN
Status: DISCONTINUED | OUTPATIENT
Start: 2021-06-03 | End: 2021-06-06

## 2021-06-03 RX ORDER — DIPHENHYDRAMINE HCL 25 MG
25 CAPSULE ORAL EVERY 4 HOURS PRN
Status: DISPENSED | OUTPATIENT
Start: 2021-06-03 | End: 2021-06-04

## 2021-06-03 RX ORDER — SODIUM CHLORIDE, SODIUM LACTATE, POTASSIUM CHLORIDE, CALCIUM CHLORIDE 600; 310; 30; 20 MG/100ML; MG/100ML; MG/100ML; MG/100ML
INJECTION, SOLUTION INTRAVENOUS CONTINUOUS
Status: DISCONTINUED | OUTPATIENT
Start: 2021-06-03 | End: 2021-06-03 | Stop reason: HOSPADM

## 2021-06-03 RX ORDER — DIPHENHYDRAMINE HYDROCHLORIDE 50 MG/ML
12.5 INJECTION INTRAMUSCULAR; INTRAVENOUS EVERY 4 HOURS PRN
Status: DISPENSED | OUTPATIENT
Start: 2021-06-03 | End: 2021-06-04

## 2021-06-03 RX ORDER — HYDROCODONE BITARTRATE AND ACETAMINOPHEN 7.5; 325 MG/1; MG/1
1 TABLET ORAL EVERY 6 HOURS PRN
Status: DISPENSED | OUTPATIENT
Start: 2021-06-03 | End: 2021-06-04

## 2021-06-03 RX ORDER — SCOLOPAMINE TRANSDERMAL SYSTEM 1 MG/1
1 PATCH, EXTENDED RELEASE TRANSDERMAL ONCE
Status: COMPLETED | OUTPATIENT
Start: 2021-06-03 | End: 2021-06-06

## 2021-06-03 RX ORDER — ONDANSETRON 2 MG/ML
4 INJECTION INTRAMUSCULAR; INTRAVENOUS ONCE AS NEEDED
Status: DISCONTINUED | OUTPATIENT
Start: 2021-06-03 | End: 2021-06-03 | Stop reason: HOSPADM

## 2021-06-03 RX ORDER — CEFAZOLIN SODIUM/WATER 2 G/20 ML
2 SYRINGE (ML) INTRAVENOUS EVERY 8 HOURS
Status: COMPLETED | OUTPATIENT
Start: 2021-06-03 | End: 2021-06-03

## 2021-06-03 RX ORDER — HALOPERIDOL 5 MG/ML
0.25 INJECTION INTRAMUSCULAR ONCE AS NEEDED
Status: DISCONTINUED | OUTPATIENT
Start: 2021-06-03 | End: 2021-06-03 | Stop reason: HOSPADM

## 2021-06-03 RX ORDER — BISACODYL 10 MG
10 SUPPOSITORY, RECTAL RECTAL
Status: DISCONTINUED | OUTPATIENT
Start: 2021-06-03 | End: 2021-06-06

## 2021-06-03 RX ORDER — SODIUM PHOSPHATE, DIBASIC AND SODIUM PHOSPHATE, MONOBASIC 7; 19 G/133ML; G/133ML
1 ENEMA RECTAL ONCE AS NEEDED
Status: DISCONTINUED | OUTPATIENT
Start: 2021-06-03 | End: 2021-06-06

## 2021-06-03 RX ORDER — NALOXONE HYDROCHLORIDE 0.4 MG/ML
0.08 INJECTION, SOLUTION INTRAMUSCULAR; INTRAVENOUS; SUBCUTANEOUS
Status: ACTIVE | OUTPATIENT
Start: 2021-06-03 | End: 2021-06-04

## 2021-06-03 RX ORDER — SENNOSIDES 8.6 MG
17.2 TABLET ORAL NIGHTLY
Status: DISCONTINUED | OUTPATIENT
Start: 2021-06-03 | End: 2021-06-06

## 2021-06-03 RX ORDER — KETOROLAC TROMETHAMINE 15 MG/ML
15 INJECTION, SOLUTION INTRAMUSCULAR; INTRAVENOUS EVERY 6 HOURS
Status: COMPLETED | OUTPATIENT
Start: 2021-06-03 | End: 2021-06-03

## 2021-06-03 RX ORDER — FAMOTIDINE 20 MG/1
20 TABLET ORAL ONCE
Status: COMPLETED | OUTPATIENT
Start: 2021-06-03 | End: 2021-06-03

## 2021-06-03 RX ORDER — MORPHINE SULFATE 1 MG/ML
INJECTION, SOLUTION EPIDURAL; INTRATHECAL; INTRAVENOUS
Status: COMPLETED | OUTPATIENT
Start: 2021-06-03 | End: 2021-06-03

## 2021-06-03 RX ORDER — POLYETHYLENE GLYCOL 3350 17 G/17G
17 POWDER, FOR SOLUTION ORAL DAILY PRN
Status: DISCONTINUED | OUTPATIENT
Start: 2021-06-03 | End: 2021-06-06

## 2021-06-03 RX ORDER — ZOLPIDEM TARTRATE 5 MG/1
5 TABLET ORAL NIGHTLY PRN
Status: DISCONTINUED | OUTPATIENT
Start: 2021-06-03 | End: 2021-06-06

## 2021-06-03 RX ORDER — NALBUPHINE HCL 10 MG/ML
2.5 AMPUL (ML) INJECTION EVERY 4 HOURS PRN
Status: ACTIVE | OUTPATIENT
Start: 2021-06-03 | End: 2021-06-04

## 2021-06-03 RX ORDER — MORPHINE SULFATE 4 MG/ML
4 INJECTION, SOLUTION INTRAMUSCULAR; INTRAVENOUS EVERY 10 MIN PRN
Status: DISCONTINUED | OUTPATIENT
Start: 2021-06-03 | End: 2021-06-03 | Stop reason: HOSPADM

## 2021-06-03 RX ORDER — LABETALOL HYDROCHLORIDE 5 MG/ML
10 INJECTION, SOLUTION INTRAVENOUS ONCE
Status: DISCONTINUED | OUTPATIENT
Start: 2021-06-03 | End: 2021-06-03 | Stop reason: HOSPADM

## 2021-06-03 RX ORDER — HYDROMORPHONE HYDROCHLORIDE 1 MG/ML
0.4 INJECTION, SOLUTION INTRAMUSCULAR; INTRAVENOUS; SUBCUTANEOUS EVERY 5 MIN PRN
Status: DISCONTINUED | OUTPATIENT
Start: 2021-06-03 | End: 2021-06-03 | Stop reason: HOSPADM

## 2021-06-03 RX ORDER — CELECOXIB 200 MG/1
200 CAPSULE ORAL ONCE
Status: COMPLETED | OUTPATIENT
Start: 2021-06-03 | End: 2021-06-03

## 2021-06-03 RX ORDER — DEXAMETHASONE SODIUM PHOSPHATE 4 MG/ML
VIAL (ML) INJECTION AS NEEDED
Status: DISCONTINUED | OUTPATIENT
Start: 2021-06-03 | End: 2021-06-03 | Stop reason: SURG

## 2021-06-03 RX ORDER — LIDOCAINE HYDROCHLORIDE 10 MG/ML
INJECTION, SOLUTION INFILTRATION; PERINEURAL
Status: COMPLETED | OUTPATIENT
Start: 2021-06-03 | End: 2021-06-03

## 2021-06-03 RX ORDER — HALOPERIDOL 5 MG/ML
0.5 INJECTION INTRAMUSCULAR ONCE AS NEEDED
Status: ACTIVE | OUTPATIENT
Start: 2021-06-03 | End: 2021-06-03

## 2021-06-03 RX ORDER — HYDROCODONE BITARTRATE AND ACETAMINOPHEN 7.5; 325 MG/1; MG/1
2 TABLET ORAL EVERY 6 HOURS PRN
Status: ACTIVE | OUTPATIENT
Start: 2021-06-03 | End: 2021-06-04

## 2021-06-03 RX ORDER — SODIUM CHLORIDE, SODIUM LACTATE, POTASSIUM CHLORIDE, CALCIUM CHLORIDE 600; 310; 30; 20 MG/100ML; MG/100ML; MG/100ML; MG/100ML
INJECTION, SOLUTION INTRAVENOUS CONTINUOUS
Status: DISCONTINUED | OUTPATIENT
Start: 2021-06-03 | End: 2021-06-05 | Stop reason: ALTCHOICE

## 2021-06-03 RX ORDER — ACETAMINOPHEN 500 MG
1000 TABLET ORAL ONCE
Status: COMPLETED | OUTPATIENT
Start: 2021-06-03 | End: 2021-06-03

## 2021-06-03 RX ORDER — BUPIVACAINE HYDROCHLORIDE 2.5 MG/ML
INJECTION, SOLUTION EPIDURAL; INFILTRATION; INTRACAUDAL AS NEEDED
Status: DISCONTINUED | OUTPATIENT
Start: 2021-06-03 | End: 2021-06-03 | Stop reason: HOSPADM

## 2021-06-03 RX ORDER — HYDROCODONE BITARTRATE AND ACETAMINOPHEN 5; 325 MG/1; MG/1
2 TABLET ORAL AS NEEDED
Status: DISCONTINUED | OUTPATIENT
Start: 2021-06-03 | End: 2021-06-03 | Stop reason: HOSPADM

## 2021-06-03 RX ORDER — PROCHLORPERAZINE EDISYLATE 5 MG/ML
10 INJECTION INTRAMUSCULAR; INTRAVENOUS EVERY 6 HOURS PRN
Status: ACTIVE | OUTPATIENT
Start: 2021-06-03 | End: 2021-06-05

## 2021-06-03 RX ORDER — PHENYLEPHRINE HCL 10 MG/ML
VIAL (ML) INJECTION AS NEEDED
Status: DISCONTINUED | OUTPATIENT
Start: 2021-06-03 | End: 2021-06-03 | Stop reason: SURG

## 2021-06-03 RX ORDER — BUPIVACAINE HYDROCHLORIDE 7.5 MG/ML
INJECTION, SOLUTION INTRASPINAL
Status: COMPLETED | OUTPATIENT
Start: 2021-06-03 | End: 2021-06-03

## 2021-06-03 RX ORDER — ONDANSETRON 2 MG/ML
INJECTION INTRAMUSCULAR; INTRAVENOUS AS NEEDED
Status: DISCONTINUED | OUTPATIENT
Start: 2021-06-03 | End: 2021-06-03 | Stop reason: SURG

## 2021-06-03 RX ORDER — BUPROPION HYDROCHLORIDE 150 MG/1
150 TABLET, EXTENDED RELEASE ORAL NIGHTLY
Status: DISCONTINUED | OUTPATIENT
Start: 2021-06-03 | End: 2021-06-06

## 2021-06-03 RX ORDER — ACETAMINOPHEN 325 MG/1
650 TABLET ORAL EVERY 6 HOURS PRN
Status: ACTIVE | OUTPATIENT
Start: 2021-06-03 | End: 2021-06-04

## 2021-06-03 RX ORDER — OXYBUTYNIN CHLORIDE 5 MG/1
5 TABLET, EXTENDED RELEASE ORAL DAILY
Status: DISCONTINUED | OUTPATIENT
Start: 2021-06-04 | End: 2021-06-06

## 2021-06-03 RX ORDER — MIDAZOLAM HYDROCHLORIDE 1 MG/ML
INJECTION INTRAMUSCULAR; INTRAVENOUS AS NEEDED
Status: DISCONTINUED | OUTPATIENT
Start: 2021-06-03 | End: 2021-06-03 | Stop reason: SURG

## 2021-06-03 RX ORDER — FAMOTIDINE 20 MG/1
20 TABLET ORAL 2 TIMES DAILY
Status: DISCONTINUED | OUTPATIENT
Start: 2021-06-03 | End: 2021-06-06

## 2021-06-03 RX ORDER — KETOROLAC TROMETHAMINE 15 MG/ML
15 INJECTION, SOLUTION INTRAMUSCULAR; INTRAVENOUS EVERY 6 HOURS
Status: DISCONTINUED | OUTPATIENT
Start: 2021-06-03 | End: 2021-06-03

## 2021-06-03 RX ORDER — HYDROCODONE BITARTRATE AND ACETAMINOPHEN 10; 325 MG/1; MG/1
1 TABLET ORAL EVERY 4 HOURS PRN
Status: DISCONTINUED | OUTPATIENT
Start: 2021-06-03 | End: 2021-06-06

## 2021-06-03 RX ORDER — HYDROCODONE BITARTRATE AND ACETAMINOPHEN 5; 325 MG/1; MG/1
1 TABLET ORAL EVERY 4 HOURS PRN
Status: DISCONTINUED | OUTPATIENT
Start: 2021-06-03 | End: 2021-06-06

## 2021-06-03 RX ORDER — MORPHINE SULFATE 4 MG/ML
2 INJECTION, SOLUTION INTRAMUSCULAR; INTRAVENOUS EVERY 10 MIN PRN
Status: DISCONTINUED | OUTPATIENT
Start: 2021-06-03 | End: 2021-06-03 | Stop reason: HOSPADM

## 2021-06-03 RX ORDER — CEFAZOLIN SODIUM/WATER 2 G/20 ML
2 SYRINGE (ML) INTRAVENOUS ONCE
Status: COMPLETED | OUTPATIENT
Start: 2021-06-03 | End: 2021-06-03

## 2021-06-03 RX ORDER — MAGNESIUM HYDROXIDE 1200 MG/15ML
LIQUID ORAL CONTINUOUS PRN
Status: DISCONTINUED | OUTPATIENT
Start: 2021-06-03 | End: 2021-06-03 | Stop reason: HOSPADM

## 2021-06-03 RX ORDER — HYDROMORPHONE HYDROCHLORIDE 1 MG/ML
0.4 INJECTION, SOLUTION INTRAMUSCULAR; INTRAVENOUS; SUBCUTANEOUS
Status: DISPENSED | OUTPATIENT
Start: 2021-06-03 | End: 2021-06-04

## 2021-06-03 RX ORDER — HYDROMORPHONE HYDROCHLORIDE 1 MG/ML
0.6 INJECTION, SOLUTION INTRAMUSCULAR; INTRAVENOUS; SUBCUTANEOUS EVERY 5 MIN PRN
Status: DISCONTINUED | OUTPATIENT
Start: 2021-06-03 | End: 2021-06-03 | Stop reason: HOSPADM

## 2021-06-03 RX ORDER — HYDROMORPHONE HYDROCHLORIDE 1 MG/ML
0.6 INJECTION, SOLUTION INTRAMUSCULAR; INTRAVENOUS; SUBCUTANEOUS
Status: ACTIVE | OUTPATIENT
Start: 2021-06-03 | End: 2021-06-04

## 2021-06-03 RX ORDER — FAMOTIDINE 10 MG/ML
20 INJECTION, SOLUTION INTRAVENOUS 2 TIMES DAILY
Status: DISCONTINUED | OUTPATIENT
Start: 2021-06-03 | End: 2021-06-06

## 2021-06-03 RX ORDER — TRANEXAMIC ACID 10 MG/ML
INJECTION, SOLUTION INTRAVENOUS AS NEEDED
Status: DISCONTINUED | OUTPATIENT
Start: 2021-06-03 | End: 2021-06-03 | Stop reason: SURG

## 2021-06-03 RX ADMIN — BUPIVACAINE HYDROCHLORIDE 1.5 ML: 7.5 INJECTION, SOLUTION INTRASPINAL at 07:20:00

## 2021-06-03 RX ADMIN — ONDANSETRON 4 MG: 2 INJECTION INTRAMUSCULAR; INTRAVENOUS at 07:51:00

## 2021-06-03 RX ADMIN — SODIUM CHLORIDE, SODIUM LACTATE, POTASSIUM CHLORIDE, CALCIUM CHLORIDE: 600; 310; 30; 20 INJECTION, SOLUTION INTRAVENOUS at 08:03:00

## 2021-06-03 RX ADMIN — PHENYLEPHRINE HCL 200 MCG: 10 MG/ML VIAL (ML) INJECTION at 07:33:00

## 2021-06-03 RX ADMIN — PHENYLEPHRINE HCL 100 MCG: 10 MG/ML VIAL (ML) INJECTION at 07:45:00

## 2021-06-03 RX ADMIN — LIDOCAINE HYDROCHLORIDE 5 ML: 10 INJECTION, SOLUTION INFILTRATION; PERINEURAL at 07:20:00

## 2021-06-03 RX ADMIN — DEXAMETHASONE SODIUM PHOSPHATE 4 MG: 4 MG/ML VIAL (ML) INJECTION at 07:51:00

## 2021-06-03 RX ADMIN — SODIUM CHLORIDE, SODIUM LACTATE, POTASSIUM CHLORIDE, CALCIUM CHLORIDE: 600; 310; 30; 20 INJECTION, SOLUTION INTRAVENOUS at 09:34:00

## 2021-06-03 RX ADMIN — PHENYLEPHRINE HCL 100 MCG: 10 MG/ML VIAL (ML) INJECTION at 08:06:00

## 2021-06-03 RX ADMIN — MIDAZOLAM HYDROCHLORIDE 2 MG: 1 INJECTION INTRAMUSCULAR; INTRAVENOUS at 07:12:00

## 2021-06-03 RX ADMIN — SODIUM CHLORIDE, SODIUM LACTATE, POTASSIUM CHLORIDE, CALCIUM CHLORIDE: 600; 310; 30; 20 INJECTION, SOLUTION INTRAVENOUS at 07:09:00

## 2021-06-03 RX ADMIN — PHENYLEPHRINE HCL 100 MCG: 10 MG/ML VIAL (ML) INJECTION at 07:54:00

## 2021-06-03 RX ADMIN — TRANEXAMIC ACID 1000 MG: 10 INJECTION, SOLUTION INTRAVENOUS at 07:43:00

## 2021-06-03 RX ADMIN — MORPHINE SULFATE 0.2 MG: 1 INJECTION, SOLUTION EPIDURAL; INTRATHECAL; INTRAVENOUS at 07:20:00

## 2021-06-03 RX ADMIN — PHENYLEPHRINE HCL 100 MCG: 10 MG/ML VIAL (ML) INJECTION at 08:11:00

## 2021-06-03 RX ADMIN — CEFAZOLIN SODIUM/WATER 2 G: 2 G/20 ML SYRINGE (ML) INTRAVENOUS at 07:35:00

## 2021-06-03 NOTE — ANESTHESIA PREPROCEDURE EVALUATION
Anesthesia PreOp Note    HPI:     Syed Cotton is a 45year old female who presents for preoperative consultation requested by: Julian Garcia MD    Date of Surgery: 6/3/2021    Procedure(s):  right total hip arthroplasty  Indication: Yaritza Graft 06/03/21 0545  ceFAZolin sodium (ANCEF/KEFZOL) 2 GM/20ML premix IV syringe 2 g, 2 g, Intravenous, Once, KILEY Valdes  lactated ringers infusion, , Intravenous, Continuous, Manjit Colin MD, Last Rate: 20 mL/hr at 06/03/21 0639, New Bag at 0 Component Value Date    WBC 7.8 06/03/2021    RBC 4.48 06/03/2021    HGB 11.9 (L) 06/03/2021    HCT 39.2 06/03/2021    MCV 87.5 06/03/2021    MCH 26.6 06/03/2021    MCHC 30.4 (L) 06/03/2021    RDW 14.8 06/03/2021    .0 06/03/2021             Vital Product Use Consented    Discussed plan with:  CRNA      I have informed [address] and/or legal guardian or family member of the nature of the anesthetic plan, benefits, risks including possible dental damage if relevant, major complications,

## 2021-06-03 NOTE — PROGRESS NOTES
[facility] - [facility]    Progress Note    Deborah Mancia Patient Status:  Inpatient    3/25/1970 MRN [id number]   Location [place] [address] Attending Kanwal Eid MD   Hosp Day # 0 PCP Wally Khan MD, MD     HPI/Sub 06/03/2021    HCT 39.2 06/03/2021    .0 06/03/2021    CREATSERUM 0.52 (L) 08/20/2020    BUN 9 08/20/2020     08/20/2020    K 4.0 08/20/2020     08/20/2020    CO2 26.0 08/20/2020     (H) 08/20/2020    CA 8.0 (L) 08/20/2020    ALB 2

## 2021-06-03 NOTE — H&P
History & Physical Examination    Patient Name: Tammy Munoz  MRN: [id number]  CSN: [id number]  YOB: 1970    Diagnosis: right hip DJD    Present Illness:  is a 44 YO F with a hx of DJD of the right hip who presents today [ ]    HEART [ Gavino Jacobsen [ ]    Miguel Faden [ Gavino Jacobsen [ ]    [place] [ Gavino Jacobsen [ ]    Kristi Ada [ Gavino Jacobsen [ ]    EXTREMITIES [ ] [x ] +pain with ROM. Skin intact. BLE NVID.  5/5 strength with DF/PF, SILT, palpable DP pulse is present.      OTHER        [ x ] I have discussed the ri

## 2021-06-03 NOTE — ANESTHESIA PROCEDURE NOTES
Spinal Block    Date/Time: 6/3/2021 7:20 AM  Performed by: James Ogden CRNA  Authorized by: Roland Burris MD       General Information and Staff    Start Time:  6/3/2021 7:15 AM  End Time:  6/3/2021 7:20 AM  Anesthesiologist:  Roland Burris MD

## 2021-06-03 NOTE — CM/SW NOTE
06/03/21 1600   CM/SW Referral Data   Referral Source Physician   Reason for Referral Discharge planning   Informant Patient;Spouse   Pertinent Medical Hx   Primary Care Physician Name Keren Patterson   Patient Info   Patient's Mental Status Alert;Oriente

## 2021-06-03 NOTE — ANESTHESIA POSTPROCEDURE EVALUATION
Patient: Aida Worrell    Procedure Summary     Date: 06/03/21 Room / Location: [facility] OR [address] [place] [address] OR    Anesthesia Start: 2147 Anesthesia Stop: 8231    Procedure: right total hip arthroplasty (Right Hip) Diagnosis: (degenerative joint dise

## 2021-06-03 NOTE — OPERATIVE REPORT
[facility]    [address] Brief Operative Note    Perla Mejia Patient Status:  Inpatient    3/25/1970 MRN [id number]   Location [facility] PRE OP RECOVERY Attending Zehra Mattson MD     PCP Festus Palencia MD, MD       Preop

## 2021-06-03 NOTE — PLAN OF CARE
S/p total right hip, up to chair and ambulating in hallway with assist and walker, hemovac drain, conti,tele,  abductor pillow in place when in bed, odilon checks q4hr wnl,.  at bedside and updated on care plan.

## 2021-06-03 NOTE — PHYSICAL THERAPY NOTE
PHYSICAL THERAPY HIP EVALUATION - INPATIENT     Room Number: 437/437-A  Evaluation Date: 6/3/2021  Type of Evaluation: Initial  Physician Order: PT Eval and Treat    Presenting Problem: s/p R SADE, posterior approach  Reason for Therapy: Mobility Dysfunctio will progress mobility to safe level for d/c home with spouse assist and continued acute PT. PT recommendation home with initial 24 hr supervision, HHPT at d/c.      Patient will benefit from continued IP PT services to address these deficits in preparation risk    WEIGHT BEARING RESTRICTION  Weight Bearing Restriction: R lower extremity  R Lower Extremity: Weight Bearing as Tolerated    PAIN ASSESSMENT  Rating:  (mild to moderate)  Location: R hip  Management Techniques: Body mechanics; Activity promotion;Rel tested    Bed Mobility: supine to sit tx via long sitting at SBA, verbal cues for sequencing    Transfers: sit to stand tx with rolling walker at CGA, verbal cues for proper hand placement    Exercise/Education Provided:  Bed mobility  Body mechanics  Ener

## 2021-06-04 PROCEDURE — 99233 SBSQ HOSP IP/OBS HIGH 50: CPT | Performed by: HOSPITALIST

## 2021-06-04 NOTE — PROGRESS NOTES
[facility]    Progress Note    Neftaly Bo Patient Status:  Inpatient    3/25/1970 MRN [id number]   Location [facility] 4W/SW/SE Attending Cinthya Senior MD   Hosp Day # 1 PCP Shilo Fletcher MD, MD       Subjective: HCl, Prochlorperazine Edisylate, zolpidem, cyclobenzaprine, acetaminophen, HYDROcodone-acetaminophen, HYDROcodone-acetaminophen, HYDROcodone-acetaminophen    Results:     Recent Labs   Lab 06/03/21  0545 06/03/21  1222 06/04/21  0634   RBC 4.48  --  3.28*

## 2021-06-04 NOTE — PLAN OF CARE
Problem: Patient Centered Care  Goal: Patient preferences are identified and integrated in the patient's plan of care  Description: Interventions:  - What would you like us to know as we care for you?  I'm doing ok  - Provide timely, complete, and accurat as needed  - Ensure adequate protection for wounds/incisions during mobilization  - Obtain PT/OT consults as needed  - Advance activity as appropriate  - Communicate ordered activity level and limitations with patient/family  Outcome: Progressing  Note: UP with activity based on assessment  - Modify environment to reduce risk of injury  - Provide assistive devices as appropriate  - Consider OT/PT consult to assist with strengthening/mobility  - Encourage toileting schedule  Outcome: Progressing  Note: Review primarily Kuwaiti speaking. Son Vani Rodriguez providing English translation. Patient is able to communicate needs.

## 2021-06-04 NOTE — PLAN OF CARE
Tolerating diet, up to chair and ambulating with assist and walker, doing better in afternoon with ambulating, norco for pain control, hemovac drain  removed, conti removed, voiding freely, abductor pillow in place while in bed, encourage to use IS every h Assess patient stability and activity tolerance for standing, transferring and ambulating w/ or w/o assistive devices  - Assist with transfers and ambulation using safe patient handling equipment as needed  - Ensure adequate protection for wounds/incisions environment to reduce risk of injury  - Provide assistive devices as appropriate  - Consider OT/PT consult to assist with strengthening/mobility  - Encourage toileting schedule  Outcome: Progressing     Problem: DISCHARGE PLANNING  Goal: Discharge to home

## 2021-06-04 NOTE — PROGRESS NOTES
[facility] - [facility]    Progress Note    Fred Caldwell Patient Status:  Inpatient    3/25/1970 MRN [id number]   Location [facility] 4W/SW/SE Attending Tim Castillo MD   Hosp Day # 1  PCP Jesse Barahona MD, MD       Subjective: 06/03/2021    CO2 29.0 06/03/2021     (H) 06/03/2021    CA 7.9 (L) 06/03/2021    ALB 2.6 (L) 08/20/2020    ALKPHO 74 08/20/2020    BILT 0.4 08/20/2020    TP 6.1 (L) 08/20/2020    AST 12 (L) 08/20/2020    ALT 36 08/20/2020       XR PELVIS (1 VIEW) (C

## 2021-06-04 NOTE — PHYSICAL THERAPY NOTE
PHYSICAL THERAPY HIP TREATMENT NOTE - INPATIENT    Room Number: 437/437-A            Presenting Problem: s/p R SADE, posterior approach    Problem List  Principal Problem:    Primary osteoarthritis of right hip  Active Problems:     Morbid obesity with BMI o quad sets, hip abduction, and heel slides.      DISCHARGE RECOMMENDATIONS  PT Discharge Recommendations: Sub-acute rehabilitation (discussed with son, son expressing preference for return home though 25 stairs will be a barrier)     PLAN  PT Treatment Plan: stair training       Patient End of Session: In bed;Needs met    CURRENT GOALS   Goals to be met by: 6/10/21  Patient Goal Patient's self-stated goal is:  To be able to walk and clean without pain   Goal #1 Patient is able to demonstrate supine - sit EOB @

## 2021-06-04 NOTE — OCCUPATIONAL THERAPY NOTE
OCCUPATIONAL THERAPY EVALUATION - INPATIENT      Room Number: 437/437-A  Evaluation Date: 6/4/2021  Type of Evaluation: Initial  Presenting Problem: R SADE- posterior    Physician Order: IP Consult to Occupational Therapy  Reason for Therapy: ADL/IADL Dysfu endurance. These deficits manifest functionally while performing ADLs and functional mobility.    The patient is below baseline and would benefit from skilled inpatient OT to address the above deficits, maximizing patient's ability to return to prior level Equipment: Walk-in shower; Shower chair  Other Equipment: Reacher    Occupation/Status: retired  Hand Dominance: Right  Drives: Yes  Patient Regularly Uses: Glasses    Stairs in Home: 24 YASIR  Assistive Device(s) Used: GAMAL     Prior Level of [place]: Estelita Ax2  Car Transfer: NT    Bedroom Mobility: CGA once standing    BALANCE ASSESSMENT  Static Sitting: Good  Dynamic Sitting: Fair Plus  Static Standing: Fair  Dynamic Standing: Fair Minus    FUNCTIONAL ADL ASSESSMENT  Grooming: CGA  Feeding: Set Up  Bathing:

## 2021-06-04 NOTE — OPERATIVE REPORT
[address] [place] [address], [facility] [address]    OPERATIVE REPORT    PATIENT NAME: Morgan Treadwell  MR#: [id number]    DATE OF PROCEDURE: 6/3/2021  PREOPERATIVE DIAGNOSIS: Childhood Hip Problem (e.g., hip dysplasia) o of motion and limited ability to walk. This has negatively affected her ability to perform the basic activities of daily living.  Based upon her radiographs, that showed severe degenerative joint disease of the right hip, she was indicated for a right total hemostasis as we dissected sharply down to the level of the deep fascia. The deep fascia was identified and incised in line with our incision and our Charnley retractor was placed deep to this layer.     We then identified the external rotators on the poste used a Charnley awl to open the medullary canal and a lateralizing reamer proximally.  We then successfully broached up to a size 16after using tapered reamers up to a size 16 and noted that we had excellent fit and fill proximally within the femur and good and sponge counts were correct. Postoperatively, she will be weight bearing as tolerated. She will work with physical therapy.  She will be on deep venous thrombosis prophylaxis for the next three weeks and jamila-operative antibiotics for the next 24 hour 2021-06-03    DISTRIBUTION LIST:  Darling Raygoza

## 2021-06-05 PROCEDURE — 99232 SBSQ HOSP IP/OBS MODERATE 35: CPT | Performed by: HOSPITALIST

## 2021-06-05 RX ORDER — CEPHALEXIN 500 MG/1
1000 CAPSULE ORAL EVERY 8 HOURS SCHEDULED
Status: DISCONTINUED | OUTPATIENT
Start: 2021-06-05 | End: 2021-06-06

## 2021-06-05 NOTE — OCCUPATIONAL THERAPY NOTE
OCCUPATIONAL THERAPY TREATMENT NOTE - INPATIENT    Room Number: 437/437-A         Presenting Problem: R SADE- posterior     Problem List  Principal Problem:    Primary osteoarthritis of right hip  Active Problems:     Morbid obesity with BMI of 40.0-44.9, ad supervision. DISCHARGE RECOMMENDATIONS  OT Discharge Recommendations: 24 hour care/supervision;Home with home health PT/OT  OT Device Recommendations: TBD    PLAN  OT Treatment Plan: Balance activities; ADL training;Functional transfer training;UE stremartínez AE    Education Provided: Role of OT, bed mobility, transfer skills, ADLs, adaptive equipment training, safety awareness   Patient End of Session: In bed;Needs met;Call light within reach;RN aware of session/findings; All patient questions and concerns addr

## 2021-06-05 NOTE — PROGRESS NOTES
[facility] [place] [facility] - [facility]    Progress Note    Michael George Patient Status:  Inpatient    3/25/1970 MRN [id number]   Location [facility] 4W/SW/SE Attending Maxwell Zee MD   Hosp Day # 2  PCP Naomi Vargas MD, MD       Subjective: 141 06/03/2021    K 4.1 06/03/2021     06/03/2021    CO2 29.0 06/03/2021     (H) 06/03/2021    CA 7.9 (L) 06/03/2021    ALB 2.6 (L) 08/20/2020    ALKPHO 74 08/20/2020    BILT 0.4 08/20/2020    TP 6.1 (L) 08/20/2020    AST 12 (L) 08/20/2020

## 2021-06-05 NOTE — PROGRESS NOTES
[facility]    Progress Note    Hanane Sloan Patient Status:  Inpatient    3/25/1970 MRN [id number]   Location [facility] 4W/SW/SE Attending Les Yang MD   Hosp Day # 2 PCP Justa Montenegro MD, MD       Subjective: ondansetron HCl, Prochlorperazine Edisylate, zolpidem, cyclobenzaprine, acetaminophen, HYDROcodone-acetaminophen, HYDROcodone-acetaminophen, HYDROcodone-acetaminophen    Results:     Recent Labs   Lab 06/03/21  0545 06/03/21  0545 06/03/21  1222 06/04/21 ([address]  MONITOR RESPIRATORY AND HEMODYNAMIC STATUS.      Greater than 35 minutes spent, >50% spent counseling re: treatment plan and workup    Twin Armstrong MD  [facility]

## 2021-06-05 NOTE — PHYSICAL THERAPY NOTE
PHYSICAL THERAPY HIP TREATMENT NOTE - INPATIENT    Room Number: 437/437-A            Presenting Problem: s/p R SADE, posterior approach    Problem List  Principal Problem:    Primary osteoarthritis of right hip  Active Problems:     Morbid obesity with BMI o RECOMMENDATIONS  Home with HH PT and 24 hrs of supervision. PLAN  PT Treatment Plan: Bed mobility; Body mechanics; Patient education;Gait training;Strengthening;Stair training;Transfer training;Balance training    SUBJECTIVE  I have a pain    OBJECTIVE  P Saq 10 reps    Sitting Knee Extension 12 reps    Standing heel/toe raises 10 reps    Hamstring Curls 0 reps    Forward, back steps 0 reps    Short Squats 0 reps      Patient End of Session: In bathroom - nursing staff aware;Family present;RN aware of ses

## 2021-06-05 NOTE — PLAN OF CARE
Patient is alert and oriented X3, primarily [address] speaking. Voiding freely. CMS intact to RLE, dressing is CDI. Up with one assist with RW. Ice pack to hip. Abductor wedge in place. Xarelto and scd's. Tele in place.  Continues to be tachy, fever noted thi Progressing     Problem: MUSCULOSKELETAL - ADULT  Goal: Return mobility to safest level of function  Description: INTERVENTIONS:  - Assess patient stability and activity tolerance for standing, transferring and ambulating w/ or w/o assistive devices  - Ass on patient safety including physical limitations  - Instruct pt to call for assistance with activity based on assessment  - Modify environment to reduce risk of injury  - Provide assistive devices as appropriate  - Consider OT/PT consult to assist with str

## 2021-06-06 VITALS
DIASTOLIC BLOOD PRESSURE: 61 MMHG | HEART RATE: 102 BPM | TEMPERATURE: 98 F | SYSTOLIC BLOOD PRESSURE: 108 MMHG | RESPIRATION RATE: 20 BRPM | HEIGHT: 64 IN | OXYGEN SATURATION: 96 % | WEIGHT: 251 LBS | BODY MASS INDEX: 42.85 KG/M2

## 2021-06-06 PROCEDURE — 99233 SBSQ HOSP IP/OBS HIGH 50: CPT | Performed by: HOSPITALIST

## 2021-06-06 RX ORDER — CEPHALEXIN 500 MG/1
1000 CAPSULE ORAL EVERY 8 HOURS SCHEDULED
Qty: 24 CAPSULE | Refills: 0 | Status: SHIPPED | OUTPATIENT
Start: 2021-06-06 | End: 2021-06-10

## 2021-06-06 NOTE — PROGRESS NOTES
[place] [facility] - [facility]    Progress Note    Ozzy Perera Patient Status:  Inpatient    3/25/1970 MRN [id number]   Location [facility] 4W/SW/SE Attending Estefani Andrea MD   Hosp Day # 3  PCP Nichole Ramachandran MD, MD       Subjective: (H) 06/03/2021    CA 7.9 (L) 06/03/2021    ALB 2.6 (L) 08/20/2020    ALKPHO 74 08/20/2020    BILT 0.4 08/20/2020    TP 6.1 (L) 08/20/2020    AST 12 (L) 08/20/2020    ALT 36 08/20/2020       No results found.           Chris Spencer MD  [address]

## 2021-06-06 NOTE — CM/SW NOTE
Sandra Portillo made to the following [facility] agency and AVS updated to reflect. SW informed [facility] that pt is discharging today 6/6 and to follow up with pt in the community.     [address] [facility], [address] [facility]  Phone: [phone]

## 2021-06-06 NOTE — DISCHARGE SUMMARY
[place] [facility] - [facility]    Discharge Summary    [address] Patient Status:  Inpatient    3/25/1970 MRN [id number]   Location [facility] 4W/SW/SE Attending Fernie Matos MD   Hosp Day # 3 PCP Sandra Azevedo MD, MD     Date of A suggestive of UTI     UTI  -plan 5 days of keflex    Morbid obesity with BMI of 40.0-44.9, adult ([facility] [address]  MONITOR RESPIRATORY AND HEMODYNAMIC STATUS.      Consultations:   ortho    Procedures: s/p rt.  Hip arthroplasty    Complications: n/a    Discharge Condi

## 2021-06-06 NOTE — PROGRESS NOTES
PHYSICAL THERAPY HIP TREATMENT NOTE - INPATIENT    Room Number: 437/437-A            Presenting Problem: s/p R SADE, posterior approach    Problem List  Principal Problem:    Primary osteoarthritis of right hip  Active Problems:     Morbid obesity with BMI o follow during IP stay, communicated w/ RN. DISCHARGE RECOMMENDATIONS  PT Discharge Recommendations: Home with home health PT;24 hour care/supervision    PLAN  PT Treatment Plan: Bed mobility; Body mechanics; Energy conservation;Patient education;Gait train shift on RLE, heavy reliance on BUE for support)  Stoop/Curb Assistance: Contact guard assist  Comment : 4 steps x2 w/ shahram railing wo cues        Patient End of Session: Up in chair;Call light within reach; Needs met;RN aware of session/findings; Family pres

## 2021-06-06 NOTE — PLAN OF CARE
Patient cleared medically. Patient discharging home with . Pain controlled with norco. Voids freely. Ambulates independently with a walker. IV removed. Discharge information given, Scripts given. Safety precautions in place.      Problem: Patient Vijaya function  Description: INTERVENTIONS:  - Assess patient stability and activity tolerance for standing, transferring and ambulating w/ or w/o assistive devices  - Assist with transfers and ambulation using safe patient handling equipment as needed  - Ensure Instruct pt to call for assistance with activity based on assessment  - Modify environment to reduce risk of injury  - Provide assistive devices as appropriate  - Consider OT/PT consult to assist with strengthening/mobility  - Encourage toileting schedule

## 2023-01-19 PROCEDURE — 93005 ELECTROCARDIOGRAM TRACING: CPT

## 2023-01-19 PROCEDURE — 99284 EMERGENCY DEPT VISIT MOD MDM: CPT

## 2023-01-19 PROCEDURE — 93010 ELECTROCARDIOGRAM REPORT: CPT

## 2023-01-20 ENCOUNTER — HOSPITAL ENCOUNTER (EMERGENCY)
Facility: HOSPITAL | Age: 53
Discharge: HOME | End: 2023-01-20
Attending: EMERGENCY MEDICINE
Payer: COMMERCIAL

## 2023-01-20 VITALS
HEART RATE: 95 BPM | OXYGEN SATURATION: 98 % | DIASTOLIC BLOOD PRESSURE: 71 MMHG | SYSTOLIC BLOOD PRESSURE: 121 MMHG | RESPIRATION RATE: 16 BRPM | TEMPERATURE: 97 F

## 2023-01-20 DIAGNOSIS — R42 DIZZINESS: Primary | ICD-10-CM

## 2023-01-20 DIAGNOSIS — N30.00 ACUTE CYSTITIS WITHOUT HEMATURIA: ICD-10-CM

## 2023-01-20 LAB
ANION GAP SERPL CALC-SCNC: 5 MMOL/L (ref 0–18)
ATRIAL RATE: 129 BPM
BASOPHILS # BLD AUTO: 0.04 X10(3) UL (ref 0–0.2)
BASOPHILS NFR BLD AUTO: 0.4 %
BILIRUB UR QL: NEGATIVE
BUN BLD-MCNC: 25 MG/DL (ref 7–18)
BUN/CREAT SERPL: 24.8 (ref 10–20)
CALCIUM BLD-MCNC: 9 MG/DL (ref 8.5–10.1)
CHLORIDE SERPL-SCNC: 112 MMOL/L (ref 98–112)
CO2 SERPL-SCNC: 25 MMOL/L (ref 21–32)
COLOR UR: YELLOW
CREAT BLD-MCNC: 1.01 MG/DL
DEPRECATED RDW RBC AUTO: 51.4 FL (ref 35.1–46.3)
EOSINOPHIL # BLD AUTO: 0.4 X10(3) UL (ref 0–0.7)
EOSINOPHIL NFR BLD AUTO: 3.6 %
ERYTHROCYTE [DISTWIDTH] IN BLOOD BY AUTOMATED COUNT: 17.1 % (ref 11–15)
GFR SERPLBLD BASED ON 1.73 SQ M-ARVRAT: 67 ML/MIN/1.73M2 (ref 60–?)
GLUCOSE BLD-MCNC: 139 MG/DL (ref 70–99)
GLUCOSE UR-MCNC: NEGATIVE MG/DL
HCT VFR BLD AUTO: 42.3 %
HGB BLD-MCNC: 13.1 G/DL
HYALINE CASTS #/AREA URNS AUTO: PRESENT /LPF
IMM GRANULOCYTES # BLD AUTO: 0.04 X10(3) UL (ref 0–1)
IMM GRANULOCYTES NFR BLD: 0.4 %
KETONES UR-MCNC: 20 MG/DL
LYMPHOCYTES # BLD AUTO: 1.69 X10(3) UL (ref 1–4)
LYMPHOCYTES NFR BLD AUTO: 15.4 %
MAGNESIUM SERPL-MCNC: 2.1 MG/DL (ref 1.6–2.6)
MCH RBC QN AUTO: 25.9 PG (ref 26–34)
MCHC RBC AUTO-ENTMCNC: 31 G/DL (ref 31–37)
MCV RBC AUTO: 83.6 FL
MONOCYTES # BLD AUTO: 0.71 X10(3) UL (ref 0.1–1)
MONOCYTES NFR BLD AUTO: 6.5 %
NEUTROPHILS # BLD AUTO: 8.12 X10 (3) UL (ref 1.5–7.7)
NEUTROPHILS # BLD AUTO: 8.12 X10(3) UL (ref 1.5–7.7)
NEUTROPHILS NFR BLD AUTO: 73.7 %
NITRITE UR QL STRIP.AUTO: POSITIVE
OSMOLALITY SERPL CALC.SUM OF ELEC: 301 MOSM/KG (ref 275–295)
P AXIS: 46 DEGREES
P-R INTERVAL: 138 MS
PH UR: 5 [PH] (ref 5–8)
PLATELET # BLD AUTO: 410 10(3)UL (ref 150–450)
POTASSIUM SERPL-SCNC: 3.8 MMOL/L (ref 3.5–5.1)
PROT UR-MCNC: 30 MG/DL
Q-T INTERVAL: 318 MS
QRS DURATION: 80 MS
QTC CALCULATION (BEZET): 465 MS
R AXIS: 70 DEGREES
RBC # BLD AUTO: 5.06 X10(6)UL
SODIUM SERPL-SCNC: 142 MMOL/L (ref 136–145)
SP GR UR STRIP: 1.03 (ref 1–1.03)
T AXIS: 5 DEGREES
TSI SER-ACNC: 1.55 MIU/ML (ref 0.36–3.74)
UROBILINOGEN UR STRIP-ACNC: 4
VENTRICULAR RATE: 129 BPM
VIT C UR-MCNC: NEGATIVE MG/DL
WBC # BLD AUTO: 11 X10(3) UL (ref 4–11)

## 2023-01-20 PROCEDURE — 87077 CULTURE AEROBIC IDENTIFY: CPT | Performed by: EMERGENCY MEDICINE

## 2023-01-20 PROCEDURE — 96374 THER/PROPH/DIAG INJ IV PUSH: CPT

## 2023-01-20 PROCEDURE — 83735 ASSAY OF MAGNESIUM: CPT | Performed by: EMERGENCY MEDICINE

## 2023-01-20 PROCEDURE — 87086 URINE CULTURE/COLONY COUNT: CPT | Performed by: EMERGENCY MEDICINE

## 2023-01-20 PROCEDURE — 85025 COMPLETE CBC W/AUTO DIFF WBC: CPT

## 2023-01-20 PROCEDURE — 96375 TX/PRO/DX INJ NEW DRUG ADDON: CPT

## 2023-01-20 PROCEDURE — 96361 HYDRATE IV INFUSION ADD-ON: CPT

## 2023-01-20 PROCEDURE — 81001 URINALYSIS AUTO W/SCOPE: CPT | Performed by: EMERGENCY MEDICINE

## 2023-01-20 PROCEDURE — 85025 COMPLETE CBC W/AUTO DIFF WBC: CPT | Performed by: EMERGENCY MEDICINE

## 2023-01-20 PROCEDURE — 80048 BASIC METABOLIC PNL TOTAL CA: CPT

## 2023-01-20 PROCEDURE — 87186 SC STD MICRODIL/AGAR DIL: CPT | Performed by: EMERGENCY MEDICINE

## 2023-01-20 PROCEDURE — 80048 BASIC METABOLIC PNL TOTAL CA: CPT | Performed by: EMERGENCY MEDICINE

## 2023-01-20 PROCEDURE — 84443 ASSAY THYROID STIM HORMONE: CPT | Performed by: EMERGENCY MEDICINE

## 2023-01-20 RX ORDER — ONDANSETRON 4 MG/1
4 TABLET, ORALLY DISINTEGRATING ORAL EVERY 8 HOURS PRN
Qty: 15 TABLET | Refills: 0 | Status: SHIPPED | OUTPATIENT
Start: 2023-01-20 | End: 2023-01-25

## 2023-01-20 RX ORDER — DIAZEPAM 5 MG/1
5 TABLET ORAL 3 TIMES DAILY PRN
Qty: 9 TABLET | Refills: 0 | Status: SHIPPED | OUTPATIENT
Start: 2023-01-20 | End: 2023-01-23

## 2023-01-20 RX ORDER — CEPHALEXIN 500 MG/1
500 CAPSULE ORAL 2 TIMES DAILY
Qty: 14 CAPSULE | Refills: 0 | Status: SHIPPED | OUTPATIENT
Start: 2023-01-20 | End: 2023-01-27

## 2023-01-20 RX ORDER — DIAZEPAM 5 MG/1
5 TABLET ORAL ONCE
Status: COMPLETED | OUTPATIENT
Start: 2023-01-20 | End: 2023-01-20

## 2023-01-20 RX ORDER — ONDANSETRON 2 MG/ML
4 INJECTION INTRAMUSCULAR; INTRAVENOUS ONCE
Status: COMPLETED | OUTPATIENT
Start: 2023-01-20 | End: 2023-01-20

## 2023-01-20 NOTE — ED INITIAL ASSESSMENT (HPI)
Patient arrives through triage with c/o of dizziness for the last week as well as nausea, vomiting and tachycardia.

## 2023-01-22 RX ORDER — SULFAMETHOXAZOLE AND TRIMETHOPRIM 800; 160 MG/1; MG/1
1 TABLET ORAL 2 TIMES DAILY
Qty: 6 TABLET | Refills: 0 | Status: SHIPPED | OUTPATIENT
Start: 2023-01-22 | End: 2023-01-25

## 2023-01-22 NOTE — PROGRESS NOTES
ED Culture Callback Results Review  Pharmacist reviewed culture results from ED visit     Positive  urine culture noted which is not susceptible to previously prescribed cephalexin. Antibiotic changed to Bactrim 1 DS tab PO BID for 3 days. New prescription was sent to [facility] by pharmacist. patient contacted by phone. patient verbalized understanding of updated treatment plan, all questions answered at this time. Patient's primary language is [place] Communication was conducted via  Gian Thomas from the language line.

## 2023-04-15 ENCOUNTER — HOSPITAL ENCOUNTER (EMERGENCY)
Facility: HOSPITAL | Age: 53
Discharge: HOME | End: 2023-04-16
Attending: EMERGENCY MEDICINE
Payer: COMMERCIAL

## 2023-04-15 DIAGNOSIS — R00.0 SINUS TACHYCARDIA: ICD-10-CM

## 2023-04-15 DIAGNOSIS — R31.9 HEMATURIA, UNSPECIFIED TYPE: Primary | ICD-10-CM

## 2023-04-15 DIAGNOSIS — E86.0 DEHYDRATION: ICD-10-CM

## 2023-04-15 DIAGNOSIS — R11.2 NAUSEA AND VOMITING IN ADULT: ICD-10-CM

## 2023-04-15 DIAGNOSIS — R79.89 ELEVATED LIVER FUNCTION TESTS: ICD-10-CM

## 2023-04-15 LAB
BASOPHILS # BLD AUTO: 0.02 X10(3) UL (ref 0–0.2)
BASOPHILS NFR BLD AUTO: 0.2 %
DEPRECATED RDW RBC AUTO: 51.3 FL (ref 35.1–46.3)
EOSINOPHIL # BLD AUTO: 0.11 X10(3) UL (ref 0–0.7)
EOSINOPHIL NFR BLD AUTO: 1 %
ERYTHROCYTE [DISTWIDTH] IN BLOOD BY AUTOMATED COUNT: 16.8 % (ref 11–15)
HCT VFR BLD AUTO: 44.6 %
HGB BLD-MCNC: 13.9 G/DL
IMM GRANULOCYTES # BLD AUTO: 0.03 X10(3) UL (ref 0–1)
IMM GRANULOCYTES NFR BLD: 0.3 %
LYMPHOCYTES # BLD AUTO: 0.7 X10(3) UL (ref 1–4)
LYMPHOCYTES NFR BLD AUTO: 6.4 %
MCH RBC QN AUTO: 26.1 PG (ref 26–34)
MCHC RBC AUTO-ENTMCNC: 31.2 G/DL (ref 31–37)
MCV RBC AUTO: 83.8 FL
MONOCYTES # BLD AUTO: 0.28 X10(3) UL (ref 0.1–1)
MONOCYTES NFR BLD AUTO: 2.6 %
NEUTROPHILS # BLD AUTO: 9.75 X10 (3) UL (ref 1.5–7.7)
NEUTROPHILS # BLD AUTO: 9.75 X10(3) UL (ref 1.5–7.7)
NEUTROPHILS NFR BLD AUTO: 89.5 %
PLATELET # BLD AUTO: 422 10(3)UL (ref 150–450)
RBC # BLD AUTO: 5.32 X10(6)UL
WBC # BLD AUTO: 10.9 X10(3) UL (ref 4–11)

## 2023-04-15 PROCEDURE — 96375 TX/PRO/DX INJ NEW DRUG ADDON: CPT

## 2023-04-15 PROCEDURE — 85025 COMPLETE CBC W/AUTO DIFF WBC: CPT | Performed by: EMERGENCY MEDICINE

## 2023-04-15 PROCEDURE — 93005 ELECTROCARDIOGRAM TRACING: CPT

## 2023-04-15 PROCEDURE — 99285 EMERGENCY DEPT VISIT HI MDM: CPT

## 2023-04-15 PROCEDURE — 83690 ASSAY OF LIPASE: CPT | Performed by: EMERGENCY MEDICINE

## 2023-04-15 PROCEDURE — 93010 ELECTROCARDIOGRAM REPORT: CPT

## 2023-04-15 PROCEDURE — 96376 TX/PRO/DX INJ SAME DRUG ADON: CPT

## 2023-04-15 PROCEDURE — 96361 HYDRATE IV INFUSION ADD-ON: CPT

## 2023-04-15 PROCEDURE — 80053 COMPREHEN METABOLIC PANEL: CPT | Performed by: EMERGENCY MEDICINE

## 2023-04-15 PROCEDURE — 96374 THER/PROPH/DIAG INJ IV PUSH: CPT

## 2023-04-15 RX ORDER — LORAZEPAM 2 MG/ML
0.5 INJECTION INTRAMUSCULAR ONCE
Status: COMPLETED | OUTPATIENT
Start: 2023-04-15 | End: 2023-04-16

## 2023-04-15 RX ORDER — DIPHENHYDRAMINE HYDROCHLORIDE 50 MG/ML
25 INJECTION INTRAMUSCULAR; INTRAVENOUS ONCE
Status: COMPLETED | OUTPATIENT
Start: 2023-04-15 | End: 2023-04-16

## 2023-04-15 RX ORDER — ACETAMINOPHEN 500 MG
1000 TABLET ORAL ONCE
Status: COMPLETED | OUTPATIENT
Start: 2023-04-15 | End: 2023-04-16

## 2023-04-15 RX ORDER — METOCLOPRAMIDE HYDROCHLORIDE 5 MG/ML
10 INJECTION INTRAMUSCULAR; INTRAVENOUS ONCE
Status: COMPLETED | OUTPATIENT
Start: 2023-04-15 | End: 2023-04-16

## 2023-04-16 VITALS
OXYGEN SATURATION: 96 % | SYSTOLIC BLOOD PRESSURE: 116 MMHG | HEIGHT: 64 IN | HEART RATE: 106 BPM | DIASTOLIC BLOOD PRESSURE: 76 MMHG | BODY MASS INDEX: 45.24 KG/M2 | RESPIRATION RATE: 22 BRPM | TEMPERATURE: 98 F | WEIGHT: 265 LBS

## 2023-04-16 LAB
ALBUMIN SERPL-MCNC: 3.7 G/DL (ref 3.4–5)
ALBUMIN/GLOB SERPL: 0.9 {RATIO} (ref 1–2)
ALP LIVER SERPL-CCNC: 120 U/L
ALT SERPL-CCNC: 105 U/L
ANION GAP SERPL CALC-SCNC: 11 MMOL/L (ref 0–18)
AST SERPL-CCNC: 52 U/L (ref 15–37)
ATRIAL RATE: 142 BPM
B-HCG UR QL: NEGATIVE
BILIRUB SERPL-MCNC: 0.7 MG/DL (ref 0.1–2)
BILIRUB UR QL: NEGATIVE
BUN BLD-MCNC: 15 MG/DL (ref 7–18)
BUN/CREAT SERPL: 17.2 (ref 10–20)
CALCIUM BLD-MCNC: 9 MG/DL (ref 8.5–10.1)
CHLORIDE SERPL-SCNC: 107 MMOL/L (ref 98–112)
CLARITY UR: CLEAR
CO2 SERPL-SCNC: 23 MMOL/L (ref 21–32)
COLOR UR: YELLOW
CREAT BLD-MCNC: 0.87 MG/DL
GFR SERPLBLD BASED ON 1.73 SQ M-ARVRAT: 79 ML/MIN/1.73M2 (ref 60–?)
GLOBULIN PLAS-MCNC: 4.3 G/DL (ref 2.8–4.4)
GLUCOSE BLD-MCNC: 176 MG/DL (ref 70–99)
GLUCOSE UR-MCNC: NORMAL MG/DL
KETONES UR-MCNC: 40 MG/DL
LEUKOCYTE ESTERASE UR QL STRIP.AUTO: NEGATIVE
LIPASE SERPL-CCNC: 23 U/L (ref 13–75)
NITRITE UR QL STRIP.AUTO: NEGATIVE
OSMOLALITY SERPL CALC.SUM OF ELEC: 297 MOSM/KG (ref 275–295)
P AXIS: 26 DEGREES
P-R INTERVAL: 120 MS
PH UR: 5.5 [PH] (ref 5–8)
POTASSIUM SERPL-SCNC: 4 MMOL/L (ref 3.5–5.1)
PROT SERPL-MCNC: 8 G/DL (ref 6.4–8.2)
PROT UR-MCNC: 20 MG/DL
Q-T INTERVAL: 304 MS
QRS DURATION: 82 MS
QTC CALCULATION (BEZET): 467 MS
R AXIS: 109 DEGREES
RBC #/AREA URNS AUTO: >10 /HPF
SODIUM SERPL-SCNC: 141 MMOL/L (ref 136–145)
SP GR UR STRIP: 1.02 (ref 1–1.03)
T AXIS: 17 DEGREES
UROBILINOGEN UR STRIP-ACNC: 3
VENTRICULAR RATE: 142 BPM

## 2023-04-16 PROCEDURE — 81001 URINALYSIS AUTO W/SCOPE: CPT | Performed by: EMERGENCY MEDICINE

## 2023-04-16 PROCEDURE — 81025 URINE PREGNANCY TEST: CPT | Performed by: EMERGENCY MEDICINE

## 2023-04-16 RX ORDER — FAMOTIDINE 20 MG/1
20 TABLET, FILM COATED ORAL 2 TIMES DAILY
Qty: 20 TABLET | Refills: 0 | Status: SHIPPED | OUTPATIENT
Start: 2023-04-16

## 2023-04-16 RX ORDER — ONDANSETRON 4 MG/1
4 TABLET, ORALLY DISINTEGRATING ORAL EVERY 4 HOURS PRN
Qty: 10 TABLET | Refills: 0 | Status: SHIPPED | OUTPATIENT
Start: 2023-04-16 | End: 2023-04-23

## 2023-04-16 RX ORDER — LORAZEPAM 2 MG/ML
0.5 INJECTION INTRAMUSCULAR ONCE
Status: COMPLETED | OUTPATIENT
Start: 2023-04-16 | End: 2023-04-16

## 2023-04-16 RX ORDER — DICYCLOMINE HCL 20 MG
20 TABLET ORAL 4 TIMES DAILY PRN
Qty: 30 TABLET | Refills: 0 | Status: SHIPPED | OUTPATIENT
Start: 2023-04-16 | End: 2023-05-16

## 2023-04-16 NOTE — ED PROVIDER NOTES
Patient signed out to me by Dr. Cameron Burris to f/u on pt response to fluids. Pt HR much improved, but still 110s. Pt family member at bedside reports that he believes her HR at baseline is usually 105 range. Pt feels much better, no longer anxious, no sob or cp. Pt would like to go home. We discussed HR and reasons to return. Pt understands and walked to bathroom without difficulty, will dc home and strict return precautions given.

## 2023-04-16 NOTE — ED INITIAL ASSESSMENT (HPI)
S: pt presents to ed with nausea diarrhea headache and increased fatigue that started yesterday.      B: none    A: tachycardic, alert oriented no distress at this time    R: protocol

## 2023-07-02 ENCOUNTER — HOSPITAL ENCOUNTER (EMERGENCY)
Facility: HOSPITAL | Age: 53
Discharge: HOME | End: 2023-07-03
Attending: EMERGENCY MEDICINE
Payer: MEDICARE

## 2023-07-02 ENCOUNTER — APPOINTMENT (OUTPATIENT)
Dept: GENERAL RADIOLOGY | Facility: HOSPITAL | Age: 53
End: 2023-07-02
Attending: EMERGENCY MEDICINE
Payer: MEDICARE

## 2023-07-02 DIAGNOSIS — S80.02XA CONTUSION OF LEFT KNEE, INITIAL ENCOUNTER: Primary | ICD-10-CM

## 2023-07-02 PROCEDURE — 99284 EMERGENCY DEPT VISIT MOD MDM: CPT

## 2023-07-02 PROCEDURE — 96372 THER/PROPH/DIAG INJ SC/IM: CPT

## 2023-07-02 PROCEDURE — 99285 EMERGENCY DEPT VISIT HI MDM: CPT

## 2023-07-02 RX ORDER — HYDROCODONE BITARTRATE AND ACETAMINOPHEN 5; 325 MG/1; MG/1
1 TABLET ORAL ONCE
Status: COMPLETED | OUTPATIENT
Start: 2023-07-02 | End: 2023-07-03

## 2023-07-03 ENCOUNTER — APPOINTMENT (OUTPATIENT)
Dept: GENERAL RADIOLOGY | Facility: HOSPITAL | Age: 53
End: 2023-07-03
Attending: EMERGENCY MEDICINE
Payer: MEDICARE

## 2023-07-03 VITALS
TEMPERATURE: 97 F | HEART RATE: 100 BPM | BODY MASS INDEX: 46.07 KG/M2 | WEIGHT: 260 LBS | RESPIRATION RATE: 18 BRPM | SYSTOLIC BLOOD PRESSURE: 142 MMHG | DIASTOLIC BLOOD PRESSURE: 74 MMHG | HEIGHT: 63 IN | OXYGEN SATURATION: 97 %

## 2023-07-03 PROCEDURE — 72170 X-RAY EXAM OF PELVIS: CPT | Performed by: EMERGENCY MEDICINE

## 2023-07-03 PROCEDURE — 73562 X-RAY EXAM OF KNEE 3: CPT | Performed by: EMERGENCY MEDICINE

## 2023-07-03 PROCEDURE — 73552 X-RAY EXAM OF FEMUR 2/>: CPT | Performed by: EMERGENCY MEDICINE

## 2023-07-03 RX ORDER — KETOROLAC TROMETHAMINE 30 MG/ML
30 INJECTION, SOLUTION INTRAMUSCULAR; INTRAVENOUS ONCE
Status: COMPLETED | OUTPATIENT
Start: 2023-07-03 | End: 2023-07-03

## 2023-07-03 RX ORDER — OMEPRAZOLE 20 MG/1
20 CAPSULE, DELAYED RELEASE ORAL
COMMUNITY
Start: 2023-05-19

## 2023-07-03 RX ORDER — LORAZEPAM 1 MG/1
1 TABLET ORAL ONCE
Status: COMPLETED | OUTPATIENT
Start: 2023-07-03 | End: 2023-07-03

## 2023-07-03 RX ORDER — HYDROCODONE BITARTRATE AND ACETAMINOPHEN 5; 325 MG/1; MG/1
1 TABLET ORAL ONCE
Status: COMPLETED | OUTPATIENT
Start: 2023-07-03 | End: 2023-07-03

## 2023-07-03 RX ORDER — LORAZEPAM 0.5 MG/1
0.5 TABLET ORAL 2 TIMES DAILY PRN
COMMUNITY
Start: 2023-05-19

## 2023-07-03 RX ORDER — HYDROCODONE BITARTRATE AND ACETAMINOPHEN 5; 325 MG/1; MG/1
1 TABLET ORAL EVERY 6 HOURS PRN
Qty: 8 TABLET | Refills: 0 | Status: SHIPPED | OUTPATIENT
Start: 2023-07-03

## 2023-07-03 RX ORDER — BUSPIRONE HYDROCHLORIDE 7.5 MG/1
7.5 TABLET ORAL 2 TIMES DAILY
COMMUNITY
Start: 2023-02-02

## 2023-07-03 NOTE — ED QUICK NOTES
Patient resting in bed in room with son at bedside. Pt requesting more information as to why she is being discharged. This writer explained the reasoning's behind discharge, pain medication that pt will be discharged with, and orthopedic follow-up. Patient verbalized understanding, but is still expressing concerns. MD made aware. Pt stating to wait until pain goes aware more before discharge.

## 2023-07-03 NOTE — ED QUICK NOTES
[facility] here ready for transport of patient back to home residence. Patient with the assist of 2 staff was able transfer herself from stretcher to wheelchair. Discharge instructions including follow-up care and medications were reviewed and discussed with patient and son at bedside. Pt and son verbalized understanding to all information and all questions asked were answered at this time. Pt is AAOx4, calm, respirations noted as even and unlabored, skin warm and dry, CMS intact to left knee, and there are no signs or symptoms of distress noted at this time of transfer.

## 2023-07-03 NOTE — ED INITIAL ASSESSMENT (HPI)
Patient arrives through triage after being helped out of private vehicle with c/o of L. Knee pain. Patient states she was taking a shower, slipped and banged her L. Knee against the wall.

## 2023-07-03 NOTE — ED QUICK NOTES
Patient educated on the current POC including discharge back to home residence. Pt offered a medicar or ambulance ride home. Patient in agreement with plan of discharge back to home via [address]. [facility] ambulance called for medicar transport back home and lift assistance to be sent to residence for assistance up pt home stairs. Pt and son at bedside are in agreement with this plan, MD is aware of plan as well and is in agreement as well.

## 2023-08-09 ENCOUNTER — HOSPITAL ENCOUNTER (INPATIENT)
Facility: HOSPITAL | Age: 53
LOS: 2 days | Discharge: HOME | End: 2023-08-11
Attending: EMERGENCY MEDICINE | Admitting: FAMILY MEDICINE
Payer: MEDICARE

## 2023-08-09 ENCOUNTER — APPOINTMENT (OUTPATIENT)
Dept: CT IMAGING | Facility: HOSPITAL | Age: 53
End: 2023-08-09
Attending: EMERGENCY MEDICINE
Payer: MEDICARE

## 2023-08-09 ENCOUNTER — APPOINTMENT (OUTPATIENT)
Dept: GENERAL RADIOLOGY | Facility: HOSPITAL | Age: 53
End: 2023-08-09
Attending: EMERGENCY MEDICINE
Payer: MEDICARE

## 2023-08-09 DIAGNOSIS — K56.609 SBO (SMALL BOWEL OBSTRUCTION) (HCC): Primary | ICD-10-CM

## 2023-08-09 LAB
ANION GAP SERPL CALC-SCNC: 8 MMOL/L (ref 0–18)
BASOPHILS # BLD AUTO: 0.1 X10(3) UL (ref 0–0.2)
BASOPHILS NFR BLD AUTO: 0.5 %
BUN BLD-MCNC: 18 MG/DL (ref 7–18)
BUN/CREAT SERPL: 29 (ref 10–20)
CALCIUM BLD-MCNC: 8.6 MG/DL (ref 8.5–10.1)
CHLORIDE SERPL-SCNC: 102 MMOL/L (ref 98–112)
CO2 SERPL-SCNC: 30 MMOL/L (ref 21–32)
CREAT BLD-MCNC: 0.62 MG/DL
DEPRECATED RDW RBC AUTO: 50.1 FL (ref 35.1–46.3)
EGFRCR SERPLBLD CKD-EPI 2021: 106 ML/MIN/1.73M2 (ref 60–?)
EOSINOPHIL # BLD AUTO: 0.35 X10(3) UL (ref 0–0.7)
EOSINOPHIL NFR BLD AUTO: 1.6 %
ERYTHROCYTE [DISTWIDTH] IN BLOOD BY AUTOMATED COUNT: 17 % (ref 11–15)
EST. AVERAGE GLUCOSE BLD GHB EST-MCNC: 134 MG/DL (ref 68–126)
GLUCOSE BLD-MCNC: 189 MG/DL (ref 70–99)
HBA1C MFR BLD: 6.3 % (ref ?–5.7)
HCT VFR BLD AUTO: 31.8 %
HGB BLD-MCNC: 10.1 G/DL
IMM GRANULOCYTES # BLD AUTO: 0.4 X10(3) UL (ref 0–1)
IMM GRANULOCYTES NFR BLD: 1.9 %
LYMPHOCYTES # BLD AUTO: 2.71 X10(3) UL (ref 1–4)
LYMPHOCYTES NFR BLD AUTO: 12.8 %
MAGNESIUM SERPL-MCNC: 2 MG/DL (ref 1.6–2.6)
MCH RBC QN AUTO: 26.7 PG (ref 26–34)
MCHC RBC AUTO-ENTMCNC: 31.8 G/DL (ref 31–37)
MCV RBC AUTO: 84.1 FL
MONOCYTES # BLD AUTO: 1.19 X10(3) UL (ref 0.1–1)
MONOCYTES NFR BLD AUTO: 5.6 %
NEUTROPHILS # BLD AUTO: 16.47 X10 (3) UL (ref 1.5–7.7)
NEUTROPHILS # BLD AUTO: 16.47 X10(3) UL (ref 1.5–7.7)
NEUTROPHILS NFR BLD AUTO: 77.6 %
OSMOLALITY SERPL CALC.SUM OF ELEC: 297 MOSM/KG (ref 275–295)
PLATELET # BLD AUTO: 460 10(3)UL (ref 150–450)
POTASSIUM SERPL-SCNC: 4.1 MMOL/L (ref 3.5–5.1)
RBC # BLD AUTO: 3.78 X10(6)UL
SODIUM SERPL-SCNC: 140 MMOL/L (ref 136–145)
WBC # BLD AUTO: 21.2 X10(3) UL (ref 4–11)

## 2023-08-09 PROCEDURE — 71045 X-RAY EXAM CHEST 1 VIEW: CPT | Performed by: EMERGENCY MEDICINE

## 2023-08-09 PROCEDURE — 74177 CT ABD & PELVIS W/CONTRAST: CPT | Performed by: EMERGENCY MEDICINE

## 2023-08-09 PROCEDURE — 99223 1ST HOSP IP/OBS HIGH 75: CPT | Performed by: INTERNAL MEDICINE

## 2023-08-09 PROCEDURE — 99254 IP/OBS CNSLTJ NEW/EST MOD 60: CPT | Performed by: SURGERY

## 2023-08-09 RX ORDER — POLYETHYLENE GLYCOL 3350 17 G/17G
17 POWDER, FOR SOLUTION ORAL DAILY PRN
Status: DISCONTINUED | OUTPATIENT
Start: 2023-08-09 | End: 2023-08-11

## 2023-08-09 RX ORDER — BENZONATATE 100 MG/1
200 CAPSULE ORAL 3 TIMES DAILY PRN
Status: DISCONTINUED | OUTPATIENT
Start: 2023-08-09 | End: 2023-08-11

## 2023-08-09 RX ORDER — MORPHINE SULFATE 4 MG/ML
4 INJECTION, SOLUTION INTRAMUSCULAR; INTRAVENOUS ONCE
Status: COMPLETED | OUTPATIENT
Start: 2023-08-09 | End: 2023-08-09

## 2023-08-09 RX ORDER — ENOXAPARIN SODIUM 100 MG/ML
0.5 INJECTION SUBCUTANEOUS DAILY
Status: DISCONTINUED | OUTPATIENT
Start: 2023-08-09 | End: 2023-08-11

## 2023-08-09 RX ORDER — ACETAMINOPHEN 500 MG
500 TABLET ORAL EVERY 4 HOURS PRN
Status: DISCONTINUED | OUTPATIENT
Start: 2023-08-09 | End: 2023-08-11

## 2023-08-09 RX ORDER — SENNOSIDES 8.6 MG
17.2 TABLET ORAL NIGHTLY PRN
Status: DISCONTINUED | OUTPATIENT
Start: 2023-08-09 | End: 2023-08-11

## 2023-08-09 RX ORDER — ONDANSETRON 2 MG/ML
4 INJECTION INTRAMUSCULAR; INTRAVENOUS ONCE
Status: COMPLETED | OUTPATIENT
Start: 2023-08-09 | End: 2023-08-09

## 2023-08-09 RX ORDER — SODIUM CHLORIDE 9 MG/ML
75 INJECTION, SOLUTION INTRAVENOUS CONTINUOUS
Status: DISCONTINUED | OUTPATIENT
Start: 2023-08-09 | End: 2023-08-11

## 2023-08-09 RX ORDER — FAMOTIDINE 10 MG/ML
20 INJECTION, SOLUTION INTRAVENOUS ONCE
Status: COMPLETED | OUTPATIENT
Start: 2023-08-09 | End: 2023-08-09

## 2023-08-09 NOTE — ED QUICK NOTES
Orders for admission, patient is aware of plan and ready to go upstairs. Any questions, please call ED RN Maureen Hutchinson at extension [phone]. Patient Covid vaccination status: Fully vaccinated     COVID Test Ordered in ED: None    COVID Suspicion at Admission: N/A    Running Infusions:  None    Mental Status/LOC at time of transport:  AxOx3    Other pertinent information:   CIWA score: N/A   NIH score:  N/A

## 2023-08-09 NOTE — ED INITIAL ASSESSMENT (HPI)
Abdominal pain started last night at 8pm and vomiting since 4pm. Also seen blood in her diarrhea today. Pt. Is projectile vomiting bile in triage.  Last ate or drank yesterday at 2pm

## 2023-08-10 ENCOUNTER — APPOINTMENT (OUTPATIENT)
Dept: GENERAL RADIOLOGY | Facility: HOSPITAL | Age: 53
End: 2023-08-10
Payer: MEDICARE

## 2023-08-10 LAB
ALBUMIN SERPL-MCNC: 2.1 G/DL (ref 3.4–5)
ALBUMIN/GLOB SERPL: 0.7 {RATIO} (ref 1–2)
ALP LIVER SERPL-CCNC: 89 U/L
ALT SERPL-CCNC: 44 U/L
ANION GAP SERPL CALC-SCNC: 6 MMOL/L (ref 0–18)
ANION GAP SERPL CALC-SCNC: 6 MMOL/L (ref 0–18)
AST SERPL-CCNC: 14 U/L (ref 15–37)
BASOPHILS # BLD AUTO: 0.03 X10(3) UL (ref 0–0.2)
BASOPHILS NFR BLD AUTO: 0.3 %
BILIRUB SERPL-MCNC: 0.3 MG/DL (ref 0.1–2)
BUN BLD-MCNC: 9 MG/DL (ref 7–18)
BUN BLD-MCNC: 9 MG/DL (ref 7–18)
BUN/CREAT SERPL: 20.5 (ref 10–20)
BUN/CREAT SERPL: 20.5 (ref 10–20)
CALCIUM BLD-MCNC: 7.2 MG/DL (ref 8.5–10.1)
CALCIUM BLD-MCNC: 7.2 MG/DL (ref 8.5–10.1)
CHLORIDE SERPL-SCNC: 110 MMOL/L (ref 98–112)
CHLORIDE SERPL-SCNC: 110 MMOL/L (ref 98–112)
CO2 SERPL-SCNC: 29 MMOL/L (ref 21–32)
CO2 SERPL-SCNC: 29 MMOL/L (ref 21–32)
CREAT BLD-MCNC: 0.44 MG/DL
CREAT BLD-MCNC: 0.44 MG/DL
DEPRECATED RDW RBC AUTO: 53.8 FL (ref 35.1–46.3)
EGFRCR SERPLBLD CKD-EPI 2021: 115 ML/MIN/1.73M2 (ref 60–?)
EGFRCR SERPLBLD CKD-EPI 2021: 115 ML/MIN/1.73M2 (ref 60–?)
EOSINOPHIL # BLD AUTO: 0.33 X10(3) UL (ref 0–0.7)
EOSINOPHIL NFR BLD AUTO: 3.4 %
ERYTHROCYTE [DISTWIDTH] IN BLOOD BY AUTOMATED COUNT: 17.5 % (ref 11–15)
GLOBULIN PLAS-MCNC: 3 G/DL (ref 2.8–4.4)
GLUCOSE BLD-MCNC: 101 MG/DL (ref 70–99)
GLUCOSE BLD-MCNC: 101 MG/DL (ref 70–99)
HCT VFR BLD AUTO: 26.8 %
HGB BLD-MCNC: 8.1 G/DL
IMM GRANULOCYTES # BLD AUTO: 0.13 X10(3) UL (ref 0–1)
IMM GRANULOCYTES NFR BLD: 1.3 %
INR BLD: 1.12 (ref 0.85–1.16)
LYMPHOCYTES # BLD AUTO: 1.88 X10(3) UL (ref 1–4)
LYMPHOCYTES NFR BLD AUTO: 19.3 %
MAGNESIUM SERPL-MCNC: 2.1 MG/DL (ref 1.6–2.6)
MCH RBC QN AUTO: 26.3 PG (ref 26–34)
MCHC RBC AUTO-ENTMCNC: 30.2 G/DL (ref 31–37)
MCV RBC AUTO: 87 FL
MONOCYTES # BLD AUTO: 0.58 X10(3) UL (ref 0.1–1)
MONOCYTES NFR BLD AUTO: 6 %
NEUTROPHILS # BLD AUTO: 6.77 X10 (3) UL (ref 1.5–7.7)
NEUTROPHILS # BLD AUTO: 6.77 X10(3) UL (ref 1.5–7.7)
NEUTROPHILS NFR BLD AUTO: 69.7 %
OSMOLALITY SERPL CALC.SUM OF ELEC: 299 MOSM/KG (ref 275–295)
OSMOLALITY SERPL CALC.SUM OF ELEC: 299 MOSM/KG (ref 275–295)
PLATELET # BLD AUTO: 369 10(3)UL (ref 150–450)
POTASSIUM SERPL-SCNC: 3.2 MMOL/L (ref 3.5–5.1)
POTASSIUM SERPL-SCNC: 3.2 MMOL/L (ref 3.5–5.1)
PROT SERPL-MCNC: 5.1 G/DL (ref 6.4–8.2)
PROTHROMBIN TIME: 14.3 SECONDS (ref 11.6–14.8)
RBC # BLD AUTO: 3.08 X10(6)UL
SODIUM SERPL-SCNC: 145 MMOL/L (ref 136–145)
SODIUM SERPL-SCNC: 145 MMOL/L (ref 136–145)
WBC # BLD AUTO: 9.7 X10(3) UL (ref 4–11)

## 2023-08-10 PROCEDURE — 99232 SBSQ HOSP IP/OBS MODERATE 35: CPT | Performed by: SURGERY

## 2023-08-10 PROCEDURE — 99233 SBSQ HOSP IP/OBS HIGH 50: CPT | Performed by: INTERNAL MEDICINE

## 2023-08-10 PROCEDURE — 74019 RADEX ABDOMEN 2 VIEWS: CPT

## 2023-08-10 NOTE — PLAN OF CARE
Care assumed from [facility] SONIA GARCIA. Pt aox4, ambulating SBA with RW. No complaints of pain or nausea. NGT maintained to LIS. Pt voiding freely in bathroom. Report endorsed to [facility]. Problem: Patient Centered Care  Goal: Patient preferences are identified and integrated in the patient's plan of care  Description: Interventions:  - What would you like us to know as we care for you?  From home with family  - Provide timely, complete, and accurate information to patient/family  - Incorporate patient and family knowledge, values, beliefs, and cultural backgrounds into the planning and delivery of care  - Encourage patient/family to participate in care and decision-making at the level they choose  - Honor patient and family perspectives and choices  Outcome: Progressing     Problem: Patient/Family Goals  Goal: Patient/Family Long Term Goal  Description: Patient's Long Term Goal:     Interventions:  -   - See additional Care Plan goals for specific interventions  Outcome: Progressing  Goal: Patient/Family Short Term Goal  Description: Patient's Short Term Goal:     Interventions:   -   - See additional Care Plan goals for specific interventions  Outcome: Progressing     Problem: PAIN - ADULT  Goal: Verbalizes/displays adequate comfort level or patient's stated pain goal  Description: INTERVENTIONS:  - Encourage pt to monitor pain and request assistance  - Assess pain using appropriate pain scale  - Administer analgesics based on type and severity of pain and evaluate response  - Implement non-pharmacological measures as appropriate and evaluate response  - Consider cultural and social influences on pain and pain management  - Manage/alleviate anxiety  - Utilize distraction and/or relaxation techniques  - Monitor for opioid side effects  - Notify MD/LIP if interventions unsuccessful or patient reports new pain  - Anticipate increased pain with activity and pre-medicate as appropriate  Outcome: Progressing     Problem: SAFETY ADULT - FALL  Goal: Free from fall injury  Description: INTERVENTIONS:  - Assess pt frequently for physical needs  - Identify cognitive and physical deficits and behaviors that affect risk of falls.   - [place] fall precautions as indicated by assessment.  - Educate pt/family on patient safety including physical limitations  - Instruct pt to call for assistance with activity based on assessment  - Modify environment to reduce risk of injury  - Provide assistive devices as appropriate  - Consider OT/PT consult to assist with strengthening/mobility  - Encourage toileting schedule  Outcome: Progressing     Problem: DISCHARGE PLANNING  Goal: Discharge to home or other facility with appropriate resources  Description: INTERVENTIONS:  - Identify barriers to discharge w/pt and caregiver  - Include patient/family/discharge partner in discharge planning  - Arrange for needed discharge resources and transportation as appropriate  - Identify discharge learning needs (meds, wound care, etc)  - Arrange for interpreters to assist at discharge as needed  - Consider post-discharge preferences of patient/family/discharge partner  - Complete POLST form as appropriate  - Assess patient's ability to be responsible for managing their own health  - Refer to Case Management Department for coordinating discharge planning if the patient needs post-hospital services based on physician/LIP order or complex needs related to functional status, cognitive ability or social support system  Outcome: Progressing     Problem: Altered Communication/Language Barrier  Goal: Patient/Family is able to understand and participate in their care  Description: Interventions:  - Assess communication ability and preferred communication style  - Implement communication aides and strategies  - Use visual cues when possible  - Listen attentively, be patient, do not interrupt  - Minimize distractions  - Allow time for understanding and response  - Establish method for patient to ask for assistance (call light)  - Provide an  as needed  - Communicate barriers and strategies to overcome with those who interact with patient  Outcome: Progressing

## 2023-08-10 NOTE — PLAN OF CARE
AXO4. RA. NG tube to LIS. German speaking. NPO strict. IV fluids infusing. Voiding freely. No complains of nausea or pain. Stand by assist w/walker. Call light within reach.  at the bedside. Problem: PAIN - ADULT  Goal: Verbalizes/displays adequate comfort level or patient's stated pain goal  Description: INTERVENTIONS:  - Encourage pt to monitor pain and request assistance  - Assess pain using appropriate pain scale  - Administer analgesics based on type and severity of pain and evaluate response  - Implement non-pharmacological measures as appropriate and evaluate response  - Consider cultural and social influences on pain and pain management  - Manage/alleviate anxiety  - Utilize distraction and/or relaxation techniques  - Monitor for opioid side effects  - Notify MD/LIP if interventions unsuccessful or patient reports new pain  - Anticipate increased pain with activity and pre-medicate as appropriate  Outcome: Progressing     Problem: SAFETY ADULT - FALL  Goal: Free from fall injury  Description: INTERVENTIONS:  - Assess pt frequently for physical needs  - Identify cognitive and physical deficits and behaviors that affect risk of falls.   - [place] fall precautions as indicated by assessment.  - Educate pt/family on patient safety including physical limitations  - Instruct pt to call for assistance with activity based on assessment  - Modify environment to reduce risk of injury  - Provide assistive devices as appropriate  - Consider OT/PT consult to assist with strengthening/mobility  - Encourage toileting schedule  Outcome: Progressing     Problem: DISCHARGE PLANNING  Goal: Discharge to home or other facility with appropriate resources  Description: INTERVENTIONS:  - Identify barriers to discharge w/pt and caregiver  - Include patient/family/discharge partner in discharge planning  - Arrange for needed discharge resources and transportation as appropriate  - Identify discharge learning needs (meds, wound care, etc)  - Arrange for interpreters to assist at discharge as needed  - Consider post-discharge preferences of patient/family/discharge partner  - Complete POLST form as appropriate  - Assess patient's ability to be responsible for managing their own health  - Refer to Case Management Department for coordinating discharge planning if the patient needs post-hospital services based on physician/LIP order or complex needs related to functional status, cognitive ability or social support system  Outcome: Progressing

## 2023-08-10 NOTE — CDS QUERY
How to answer this Query:    1.) DON'T CLICK COSIGN BUTTON FIRST  2.) Click \"3 dots. Yoko Ashkan Yoko Ashkan \" to the right of cosign button and click EDIT on the toolbar.  2.) Type an \"X\" in the bracket for the diagnosis that applies. (You may also add additional clinical details as you feel necessary to substantiate your response). 3.) Finally click \"Sign\" to complete response. Thank Jez Trinidad    Dear Yobany Coffman information (provided below) suggests an elevated Body Mass Index of 53.68 please further clarify if possible diagnosis      ( X)  Morbid Obesity due to Excess Calories    ( )  Other (please specify)     Documentation of BMI: 53.68  t 5'1 284#  If you have any questions, please contact Clinical :  Rhiannon Machado RN at [phone]     Thank You!     THIS FORM IS A PERMANENT PART OF THE MEDICAL RECORD

## 2023-08-10 NOTE — PLAN OF CARE
Patient alert and oriented x4. Vss. On room air. NPO. NGT to LIS. Obstructive series done today. Voiding freely, pt reported increase pain in L knee when ambulating due to fall from one month ago, purewick placed to aid patient with this. Lidocaine patch applied. -BM, cdiff sample to be collected pending BM. Ambulating with 1 assist and RW. Fall precautions in place.  at bedside, aware and agreeable to POC.      Clamp trial start approx 1615, pt and  aware and agreeable to POC re clamp trial/   Problem: Patient Centered Care  Goal: Patient preferences are identified and integrated in the patient's plan of care  Description: Interventions:  - What would you like us to know as we care for you?   - Provide timely, complete, and accurate information to patient/family  - Incorporate patient and family knowledge, values, beliefs, and cultural backgrounds into the planning and delivery of care  - Encourage patient/family to participate in care and decision-making at the level they choose  - Honor patient and family perspectives and choices  Outcome: Progressing     Problem: Patient/Family Goals  Goal: Patient/Family Long Term Goal  Description: Patient's Long Term Goal:     Interventions:  -   - See additional Care Plan goals for specific interventions  Outcome: Progressing  Goal: Patient/Family Short Term Goal  Description: Patient's Short Term Goal:     Interventions:   -   - See additional Care Plan goals for specific interventions  Outcome: Progressing     Problem: PAIN - ADULT  Goal: Verbalizes/displays adequate comfort level or patient's stated pain goal  Description: INTERVENTIONS:  - Encourage pt to monitor pain and request assistance  - Assess pain using appropriate pain scale  - Administer analgesics based on type and severity of pain and evaluate response  - Implement non-pharmacological measures as appropriate and evaluate response  - Consider cultural and social influences on pain and pain management  - Manage/alleviate anxiety  - Utilize distraction and/or relaxation techniques  - Monitor for opioid side effects  - Notify MD/LIP if interventions unsuccessful or patient reports new pain  - Anticipate increased pain with activity and pre-medicate as appropriate  Outcome: Progressing     Problem: SAFETY ADULT - FALL  Goal: Free from fall injury  Description: INTERVENTIONS:  - Assess pt frequently for physical needs  - Identify cognitive and physical deficits and behaviors that affect risk of falls.   - [place] fall precautions as indicated by assessment.  - Educate pt/family on patient safety including physical limitations  - Instruct pt to call for assistance with activity based on assessment  - Modify environment to reduce risk of injury  - Provide assistive devices as appropriate  - Consider OT/PT consult to assist with strengthening/mobility  - Encourage toileting schedule  Outcome: Progressing     Problem: DISCHARGE PLANNING  Goal: Discharge to home or other facility with appropriate resources  Description: INTERVENTIONS:  - Identify barriers to discharge w/pt and caregiver  - Include patient/family/discharge partner in discharge planning  - Arrange for needed discharge resources and transportation as appropriate  - Identify discharge learning needs (meds, wound care, etc)  - Arrange for interpreters to assist at discharge as needed  - Consider post-discharge preferences of patient/family/discharge partner  - Complete POLST form as appropriate  - Assess patient's ability to be responsible for managing their own health  - Refer to Case Management Department for coordinating discharge planning if the patient needs post-hospital services based on physician/LIP order or complex needs related to functional status, cognitive ability or social support system  Outcome: Progressing     Problem: Altered Communication/Language Barrier  Goal: Patient/Family is able to understand and participate in their care  Description: Interventions:  - Assess communication ability and preferred communication style  - Implement communication aides and strategies  - Use visual cues when possible  - Listen attentively, be patient, do not interrupt  - Minimize distractions  - Allow time for understanding and response  - Establish method for patient to ask for assistance (call light)  - Provide an  as needed  - Communicate barriers and strategies to overcome with those who interact with patient  Outcome: Progressing

## 2023-08-11 VITALS
DIASTOLIC BLOOD PRESSURE: 77 MMHG | HEART RATE: 106 BPM | OXYGEN SATURATION: 93 % | HEIGHT: 61 IN | WEIGHT: 284.13 LBS | RESPIRATION RATE: 18 BRPM | SYSTOLIC BLOOD PRESSURE: 142 MMHG | BODY MASS INDEX: 53.64 KG/M2 | TEMPERATURE: 99 F

## 2023-08-11 LAB
ANION GAP SERPL CALC-SCNC: 6 MMOL/L (ref 0–18)
BASOPHILS # BLD AUTO: 0.02 X10(3) UL (ref 0–0.2)
BASOPHILS NFR BLD AUTO: 0.2 %
BUN BLD-MCNC: 4 MG/DL (ref 7–18)
BUN/CREAT SERPL: 9.5 (ref 10–20)
C DIFF GDH + TOXINS A+B STL QL IA.RAPID: NOT DETECTED
C DIFF TOX B STL QL: POSITIVE
CALCIUM BLD-MCNC: 7.8 MG/DL (ref 8.5–10.1)
CHLORIDE SERPL-SCNC: 107 MMOL/L (ref 98–112)
CO2 SERPL-SCNC: 28 MMOL/L (ref 21–32)
CREAT BLD-MCNC: 0.42 MG/DL
DEPRECATED RDW RBC AUTO: 54.5 FL (ref 35.1–46.3)
EGFRCR SERPLBLD CKD-EPI 2021: 116 ML/MIN/1.73M2 (ref 60–?)
EOSINOPHIL # BLD AUTO: 0.32 X10(3) UL (ref 0–0.7)
EOSINOPHIL NFR BLD AUTO: 3.2 %
ERYTHROCYTE [DISTWIDTH] IN BLOOD BY AUTOMATED COUNT: 17.2 % (ref 11–15)
GLUCOSE BLD-MCNC: 93 MG/DL (ref 70–99)
HCT VFR BLD AUTO: 28.6 %
HGB BLD-MCNC: 8.6 G/DL
IMM GRANULOCYTES # BLD AUTO: 0.1 X10(3) UL (ref 0–1)
IMM GRANULOCYTES NFR BLD: 1 %
LYMPHOCYTES # BLD AUTO: 1.94 X10(3) UL (ref 1–4)
LYMPHOCYTES NFR BLD AUTO: 19.5 %
MCH RBC QN AUTO: 26.5 PG (ref 26–34)
MCHC RBC AUTO-ENTMCNC: 30.1 G/DL (ref 31–37)
MCV RBC AUTO: 88.3 FL
MONOCYTES # BLD AUTO: 0.65 X10(3) UL (ref 0.1–1)
MONOCYTES NFR BLD AUTO: 6.5 %
NEUTROPHILS # BLD AUTO: 6.93 X10 (3) UL (ref 1.5–7.7)
NEUTROPHILS # BLD AUTO: 6.93 X10(3) UL (ref 1.5–7.7)
NEUTROPHILS NFR BLD AUTO: 69.6 %
OSMOLALITY SERPL CALC.SUM OF ELEC: 289 MOSM/KG (ref 275–295)
PLATELET # BLD AUTO: 347 10(3)UL (ref 150–450)
POTASSIUM SERPL-SCNC: 3.4 MMOL/L (ref 3.5–5.1)
POTASSIUM SERPL-SCNC: 3.4 MMOL/L (ref 3.5–5.1)
RBC # BLD AUTO: 3.24 X10(6)UL
SODIUM SERPL-SCNC: 141 MMOL/L (ref 136–145)
WBC # BLD AUTO: 10 X10(3) UL (ref 4–11)

## 2023-08-11 PROCEDURE — 99232 SBSQ HOSP IP/OBS MODERATE 35: CPT | Performed by: SURGERY

## 2023-08-11 PROCEDURE — 99239 HOSP IP/OBS DSCHRG MGMT >30: CPT | Performed by: INTERNAL MEDICINE

## 2023-08-11 RX ORDER — POTASSIUM CHLORIDE 20 MEQ/1
40 TABLET, EXTENDED RELEASE ORAL ONCE
Status: COMPLETED | OUTPATIENT
Start: 2023-08-11 | End: 2023-08-11

## 2023-08-11 NOTE — DISCHARGE INSTRUCTIONS
Continue full liquid diet for two days before slowly advancing to soft diet for a week. Encouraged ambulation.

## 2024-04-07 NOTE — PLAN OF CARE
Encounter Date: 2024       History     Chief Complaint   Patient presents with    Itching     Patient reports itchy hands and feet for the last three weeks. She denies any rash to the areas.      HPI    32-year-old female presents the ER for evaluation of pruritus of her hands and feet.  Onset 2 weeks, no improvement with symptoms seen evaluated at multiple ERs.  She reports she was frustrated, she came the ER for further evaluation    Review of patient's allergies indicates:   Allergen Reactions    Penicillins Swelling     Past Medical History:   Diagnosis Date    Anemia     Asthma      Past Surgical History:   Procedure Laterality Date     SECTION      TUBAL LIGATION       Family History   Problem Relation Age of Onset    Breast cancer Neg Hx     Colon cancer Neg Hx     Ovarian cancer Neg Hx      Social History     Tobacco Use    Smoking status: Never    Smokeless tobacco: Never   Substance Use Topics    Alcohol use: No    Drug use: No     Review of Systems   All other systems reviewed and are negative.      Physical Exam     Initial Vitals [24 0309]   BP Pulse Resp Temp SpO2   (!) 187/95 98 18 98.3 °F (36.8 °C) 96 %      MAP       --         Physical Exam    Nursing note and vitals reviewed.  Constitutional: She appears well-developed and well-nourished.   Eyes: Pupils are equal, round, and reactive to light.   Neck:   Normal range of motion.  Abdominal: She exhibits no distension.   Musculoskeletal:      Cervical back: Normal range of motion.     Neurological: She is alert and oriented to person, place, and time. She has normal strength. GCS score is 15. GCS eye subscore is 4. GCS verbal subscore is 5. GCS motor subscore is 6.   Skin: Skin is warm and dry. Capillary refill takes less than 2 seconds.   No significant rash, urticaria, bite marks skin lesions noted to hands and feet   Psychiatric: She has a normal mood and affect. Thought content normal.         ED Course   Procedures  Labs  Pt is alert and oriented x4. Primarily Lithuanian speaking. On room air. Vital signs stable. Ambulating independently using a walker. Complains of knee pain. Lidocaine patch applied per MAR. Denies abdominal pain. Advanced to full liquid diet. Tolerating well. Cleared for a discharge. IV removed with no signs of infection. AVS explained. Follow up with PCP in a week. Pt took all her belongings and was wheeled downstairs by a nurse.      Problem: Patient Centered Care  Goal: Patient preferences are identified and integrated in the patient's plan of care  Description: Interventions:  - Provide timely, complete, and accurate information to patient/family  - Incorporate patient and family knowledge, values, beliefs, and cultural backgrounds into the planning and delivery of care  - Encourage patient/family to participate in care and decision-making at the level they choose  - Honor patient and family perspectives and choices  Outcome: Progressing     Problem: Patient/Family Goals  Goal: Patient/Family Long Term Goal  Description: Patient's Long Term Goal: Discharge home    Interventions:  - Identify barriers to discharge w/pt and caregiver  - Include patient/family/discharge partner in discharge planning  - Arrange for needed discharge resources and transportation as appropriate  - Identify discharge learning needs (meds, wound care, etc)  - Arrange for interpreters to assist at discharge as needed  - Consider post-discharge preferences of patient/family/discharge partner  - Complete POLST form as appropriate  - Assess patient's ability to be responsible for managing their own health  - Refer to Case Management Department for coordinating discharge planning if the patient needs post-hospital services based on physician/LIP order or complex needs related to functional status, cognitive ability or social support system    - See additional Care Plan goals for specific interventions  Outcome: Progressing  Goal: Patient/Family Reviewed - No data to display       Imaging Results    None          Medications   dexAMETHasone tablet 16 mg (16 mg Oral Given 4/7/24 0419)   LIDOcaine-prilocaine cream ( Topical (Top) Given 4/7/24 0419)     Medical Decision Making  32-year-old female presents the ER for evaluation of persistent pruritus of hands and feet.  Has been seen multiple times in the ER.  Does not have a PCP to follow up with.  Has been on oral steroids, Atarax, antihistamines with no improvement.  No change in condition.  She reports symptoms mostly related to her hands and feet.  Physical exam overall unremarkable.  She was had blood work which has been reassuring.  She does have known fatty liver previous blood work has revealed normal bilirubin, BUN.  Unsure of reason for pruritus.  Patient needs follow up with primary care allergist will place referral.  Will try EMLA cream, Decadron.  Will discharge with topical steroids, patient advised to use creams as well.  Return precautions discussed patient discharged    Risk  Prescription drug management.                                      Clinical Impression:  Final diagnoses:  [L29.9] Pruritus (Primary)          ED Disposition Condition    Discharge Stable          ED Prescriptions       Medication Sig Dispense Start Date End Date Auth. Provider    triamcinolone acetonide 0.1% (KENALOG) 0.1 % ointment Apply topically 2 (two) times daily. for 7 days 80 g 4/7/2024 4/14/2024 Og Smith MD          Follow-up Information       Follow up With Specialties Details Why Contact Info Additional Information    [facility] Schedule an appointment as soon as possible for a visit  As needed [address], [address]  [place] [address] [phone] Please park in [address] or D and use [address] entrance. Take Medical Office Bldg. elevators.    [facility] Schedule an appointment as soon as possible for a visit  As needed [address]  Short Term Goal  Description: Patient's Short Term Goal: Pain control    Interventions:   -Assess pain level  -Encourage pt to notify of increasing pain level  -Administer pain medication as prescribed and prn  -Provide non-pharmacological intervention  -Follow plan of care    - See additional Care Plan goals for specific interventions  Outcome: Progressing     Problem: PAIN - ADULT  Goal: Verbalizes/displays adequate comfort level or patient's stated pain goal  Description: INTERVENTIONS:  - Encourage pt to monitor pain and request assistance  - Assess pain using appropriate pain scale  - Administer analgesics based on type and severity of pain and evaluate response  - Implement non-pharmacological measures as appropriate and evaluate response  - Consider cultural and social influences on pain and pain management  - Manage/alleviate anxiety  - Utilize distraction and/or relaxation techniques  - Monitor for opioid side effects  - Notify MD/LIP if interventions unsuccessful or patient reports new pain  - Anticipate increased pain with activity and pre-medicate as appropriate  Outcome: Progressing     Problem: SAFETY ADULT - FALL  Goal: Free from fall injury  Description: INTERVENTIONS:  - Assess pt frequently for physical needs  - Identify cognitive and physical deficits and behaviors that affect risk of falls.   - [place] fall precautions as indicated by assessment.  - Educate pt/family on patient safety including physical limitations  - Instruct pt to call for assistance with activity based on assessment  - Modify environment to reduce risk of injury  - Provide assistive devices as appropriate  - Consider OT/PT consult to assist with strengthening/mobility  - Encourage toileting schedule  Outcome: Progressing     Problem: DISCHARGE PLANNING  Goal: Discharge to home or other facility with appropriate resources  Description: INTERVENTIONS:  - Identify barriers to discharge w/pt and caregiver  - [address]  [facility] [address]  [phone]              Og Smith MD  04/08/24 0014     patient/family/discharge partner in discharge planning  - Arrange for needed discharge resources and transportation as appropriate  - Identify discharge learning needs (meds, wound care, etc)  - Arrange for interpreters to assist at discharge as needed  - Consider post-discharge preferences of patient/family/discharge partner  - Complete POLST form as appropriate  - Assess patient's ability to be responsible for managing their own health  - Refer to Case Management Department for coordinating discharge planning if the patient needs post-hospital services based on physician/LIP order or complex needs related to functional status, cognitive ability or social support system  Outcome: Progressing     Problem: Altered Communication/Language Barrier  Goal: Patient/Family is able to understand and participate in their care  Description: Interventions:  - Assess communication ability and preferred communication style  - Implement communication aides and strategies  - Use visual cues when possible  - Listen attentively, be patient, do not interrupt  - Minimize distractions  - Allow time for understanding and response  - Establish method for patient to ask for assistance (call light)  - Provide an  as needed  - Communicate barriers and strategies to overcome with those who interact with patient  Outcome: Progressing

## 2024-06-29 ENCOUNTER — HOSPITAL ENCOUNTER (EMERGENCY)
Facility: HOSPITAL | Age: 54
Discharge: HOME | End: 2024-06-30
Attending: EMERGENCY MEDICINE
Payer: MEDICARE

## 2024-06-29 ENCOUNTER — APPOINTMENT (OUTPATIENT)
Dept: GENERAL RADIOLOGY | Facility: HOSPITAL | Age: 54
End: 2024-06-29
Attending: EMERGENCY MEDICINE
Payer: MEDICARE

## 2024-06-29 DIAGNOSIS — K21.00 GASTROESOPHAGEAL REFLUX DISEASE WITH ESOPHAGITIS, UNSPECIFIED WHETHER HEMORRHAGE: Primary | ICD-10-CM

## 2024-06-29 LAB
ALBUMIN SERPL-MCNC: 4.7 G/DL (ref 3.2–4.8)
ALBUMIN/GLOB SERPL: 1.7 {RATIO} (ref 1–2)
ALP LIVER SERPL-CCNC: 101 U/L
ALT SERPL-CCNC: 100 U/L
ANION GAP SERPL CALC-SCNC: 10 MMOL/L (ref 0–18)
AST SERPL-CCNC: 66 U/L (ref ?–34)
BASOPHILS # BLD AUTO: 0.04 X10(3) UL (ref 0–0.2)
BASOPHILS NFR BLD AUTO: 0.3 %
BILIRUB SERPL-MCNC: 0.4 MG/DL (ref 0.3–1.2)
BUN BLD-MCNC: 9 MG/DL (ref 9–23)
BUN/CREAT SERPL: 12.7 (ref 10–20)
CALCIUM BLD-MCNC: 9.8 MG/DL (ref 8.7–10.4)
CHLORIDE SERPL-SCNC: 108 MMOL/L (ref 98–112)
CO2 SERPL-SCNC: 26 MMOL/L (ref 21–32)
CREAT BLD-MCNC: 0.71 MG/DL
DEPRECATED RDW RBC AUTO: 49 FL (ref 35.1–46.3)
EGFRCR SERPLBLD CKD-EPI 2021: 100 ML/MIN/1.73M2 (ref 60–?)
EOSINOPHIL # BLD AUTO: 0.26 X10(3) UL (ref 0–0.7)
EOSINOPHIL NFR BLD AUTO: 2 %
ERYTHROCYTE [DISTWIDTH] IN BLOOD BY AUTOMATED COUNT: 19.5 % (ref 11–15)
GLOBULIN PLAS-MCNC: 2.7 G/DL (ref 2–3.5)
GLUCOSE BLD-MCNC: 131 MG/DL (ref 70–99)
HCT VFR BLD AUTO: 36.4 %
HGB BLD-MCNC: 10.7 G/DL
IMM GRANULOCYTES # BLD AUTO: 0.04 X10(3) UL (ref 0–1)
IMM GRANULOCYTES NFR BLD: 0.3 %
LYMPHOCYTES # BLD AUTO: 2.97 X10(3) UL (ref 1–4)
LYMPHOCYTES NFR BLD AUTO: 23.3 %
MCH RBC QN AUTO: 21.2 PG (ref 26–34)
MCHC RBC AUTO-ENTMCNC: 29.4 G/DL (ref 31–37)
MCV RBC AUTO: 72.2 FL
MONOCYTES # BLD AUTO: 0.74 X10(3) UL (ref 0.1–1)
MONOCYTES NFR BLD AUTO: 5.8 %
NEUTROPHILS # BLD AUTO: 8.67 X10 (3) UL (ref 1.5–7.7)
NEUTROPHILS # BLD AUTO: 8.67 X10(3) UL (ref 1.5–7.7)
NEUTROPHILS NFR BLD AUTO: 68.3 %
OSMOLALITY SERPL CALC.SUM OF ELEC: 298 MOSM/KG (ref 275–295)
PLATELET # BLD AUTO: 518 10(3)UL (ref 150–450)
POTASSIUM SERPL-SCNC: 4.6 MMOL/L (ref 3.5–5.1)
PROT SERPL-MCNC: 7.4 G/DL (ref 5.7–8.2)
RBC # BLD AUTO: 5.04 X10(6)UL
SODIUM SERPL-SCNC: 144 MMOL/L (ref 136–145)
TROPONIN I SERPL HS-MCNC: 3 NG/L
WBC # BLD AUTO: 12.7 X10(3) UL (ref 4–11)

## 2024-06-29 PROCEDURE — 85025 COMPLETE CBC W/AUTO DIFF WBC: CPT | Performed by: EMERGENCY MEDICINE

## 2024-06-29 PROCEDURE — 71045 X-RAY EXAM CHEST 1 VIEW: CPT | Performed by: EMERGENCY MEDICINE

## 2024-06-29 PROCEDURE — 93005 ELECTROCARDIOGRAM TRACING: CPT

## 2024-06-29 PROCEDURE — 96375 TX/PRO/DX INJ NEW DRUG ADDON: CPT

## 2024-06-29 PROCEDURE — 99285 EMERGENCY DEPT VISIT HI MDM: CPT

## 2024-06-29 PROCEDURE — 96374 THER/PROPH/DIAG INJ IV PUSH: CPT

## 2024-06-29 PROCEDURE — S0028 INJECTION, FAMOTIDINE, 20 MG: HCPCS | Performed by: EMERGENCY MEDICINE

## 2024-06-29 PROCEDURE — 96361 HYDRATE IV INFUSION ADD-ON: CPT

## 2024-06-29 PROCEDURE — 84484 ASSAY OF TROPONIN QUANT: CPT | Performed by: EMERGENCY MEDICINE

## 2024-06-29 PROCEDURE — 93010 ELECTROCARDIOGRAM REPORT: CPT

## 2024-06-29 PROCEDURE — 99284 EMERGENCY DEPT VISIT MOD MDM: CPT

## 2024-06-29 PROCEDURE — 80053 COMPREHEN METABOLIC PANEL: CPT | Performed by: EMERGENCY MEDICINE

## 2024-06-29 PROCEDURE — 82272 OCCULT BLD FECES 1-3 TESTS: CPT

## 2024-06-29 RX ORDER — FAMOTIDINE 10 MG/ML
20 INJECTION, SOLUTION INTRAVENOUS ONCE
Status: COMPLETED | OUTPATIENT
Start: 2024-06-29 | End: 2024-06-29

## 2024-06-29 RX ORDER — METOCLOPRAMIDE HYDROCHLORIDE 5 MG/ML
5 INJECTION INTRAMUSCULAR; INTRAVENOUS ONCE
Status: COMPLETED | OUTPATIENT
Start: 2024-06-29 | End: 2024-06-29

## 2024-06-29 RX ORDER — DIPHENHYDRAMINE HYDROCHLORIDE 50 MG/ML
25 INJECTION INTRAMUSCULAR; INTRAVENOUS ONCE
Status: COMPLETED | OUTPATIENT
Start: 2024-06-29 | End: 2024-06-29

## 2024-06-29 RX ORDER — ONDANSETRON 2 MG/ML
4 INJECTION INTRAMUSCULAR; INTRAVENOUS ONCE
Status: DISCONTINUED | OUTPATIENT
Start: 2024-06-29 | End: 2024-06-29

## 2024-06-29 RX ORDER — ACETAMINOPHEN 500 MG
1000 TABLET ORAL ONCE
Status: COMPLETED | OUTPATIENT
Start: 2024-06-29 | End: 2024-06-29

## 2024-06-30 VITALS
TEMPERATURE: 98 F | BODY MASS INDEX: 52.41 KG/M2 | HEIGHT: 59 IN | RESPIRATION RATE: 24 BRPM | OXYGEN SATURATION: 97 % | HEART RATE: 96 BPM | SYSTOLIC BLOOD PRESSURE: 125 MMHG | WEIGHT: 260 LBS | DIASTOLIC BLOOD PRESSURE: 56 MMHG

## 2024-06-30 LAB
ATRIAL RATE: 118 BPM
P AXIS: 33 DEGREES
P-R INTERVAL: 136 MS
Q-T INTERVAL: 332 MS
QRS DURATION: 84 MS
QTC CALCULATION (BEZET): 465 MS
R AXIS: 60 DEGREES
T AXIS: -3 DEGREES
VENTRICULAR RATE: 118 BPM

## 2024-06-30 RX ORDER — ONDANSETRON 4 MG/1
4 TABLET, ORALLY DISINTEGRATING ORAL EVERY 4 HOURS PRN
Qty: 10 TABLET | Refills: 0 | Status: SHIPPED | OUTPATIENT
Start: 2024-06-30 | End: 2024-07-07

## 2024-06-30 RX ORDER — FAMOTIDINE 20 MG/1
20 TABLET, FILM COATED ORAL 2 TIMES DAILY PRN
Qty: 30 TABLET | Refills: 0 | Status: SHIPPED | OUTPATIENT
Start: 2024-06-30 | End: 2024-07-30

## 2024-06-30 RX ORDER — PANTOPRAZOLE SODIUM 20 MG/1
20 TABLET, DELAYED RELEASE ORAL DAILY
Qty: 30 TABLET | Refills: 0 | Status: SHIPPED | OUTPATIENT
Start: 2024-06-30 | End: 2024-07-30

## 2024-06-30 NOTE — ED PROVIDER NOTES
Patient Seen in: [facility] Emergency Department    History     Chief Complaint   Patient presents with    Chest Pressure    Nausea    GI Bleeding       HPI    History is provided by patient/independent historian: patient's son, patient  54 year old female with history of gastritis, and metronidazole, pink bismuth, tetracycline, here with complaints of chest pain, nausea, feeling like she needs to belch, and black stools.  She also has headache.  Son was concerned for bleeding    History reviewed.   Past Medical History:    Anxiety state    Depression    no longer taking meds     History of blood transfusion    25 yrs ago;no reactions    Osteoarthritis    Visual impairment    eyeglasses         History reviewed.   Past Surgical History:   Procedure Laterality Date          Cholecystectomy      Total hip replacement Left     Total knee replacement Right 2019    Total knee replacement Left 2020         Home Medications reviewed :  (Not in a hospital admission)        History reviewed.   Social History     Socioeconomic History    Marital status:    Tobacco Use    Smoking status: Never    Smokeless tobacco: Never   Vaping Use    Vaping status: Never Used   Substance and Sexual Activity    Alcohol use: No    Drug use: No         ROS  Review of Systems   Respiratory:  Negative for shortness of breath.    Cardiovascular:  Positive for chest pain.   Gastrointestinal:  Positive for abdominal pain and nausea.   Neurological:  Positive for headaches.   All other systems reviewed and are negative.     All other pertinent organ systems are reviewed and are negative.      Physical Exam     ED Triage Vitals [24 2241]   /86   Pulse 115   Resp 20   Temp 97.6 °F (36.4 °C)   Temp src Temporal   SpO2 97 %   O2 Device None (Room air)     Vital signs reviewed.      Physical Exam  Vitals and nursing note reviewed.   Cardiovascular:      Pulses: Normal pulses.   Pulmonary:      Effort:  No respiratory distress.   Abdominal:      General: There is no distension.      Tenderness: There is no abdominal tenderness.   Genitourinary:     Comments: Black stool, guiac negative  Neurological:      Mental Status: She is alert.         ED Course       Labs:     Labs Reviewed   COMP METABOLIC PANEL (14) - Abnormal; Notable for the following components:       Result Value    Glucose 131 (*)     Calculated Osmolality 298 (*)      (*)     AST 66 (*)     All other components within normal limits   CBC W/ DIFFERENTIAL - Abnormal; Notable for the following components:    WBC 12.7 (*)     HGB 10.7 (*)     MCV 72.2 (*)     MCH 21.2 (*)     MCHC 29.4 (*)     RDW-SD 49.0 (*)     RDW 19.5 (*)     .0 (*)     Neutrophil Absolute Prelim 8.67 (*)     Neutrophil Absolute 8.67 (*)     All other components within normal limits   TROPONIN I HIGH SENSITIVITY - Normal   CBC WITH DIFFERENTIAL WITH PLATELET    Narrative:     The following orders were created for panel order CBC With Differential With Platelet.                  Procedure                               Abnormality         Status                                     ---------                               -----------         ------                                     CBC W/ DIFFERENTIAL[[id number]]          Abnormal            Final result                                                 Please view results for these tests on the individual orders.   RAINBOW DRAW BLUE         My EKG Interpretation: EKG    Rate, intervals and axes as noted on EKG Report.  Rate: 118  Rhythm: Sinus Rhythm  Reading: abnormal for rate, normal for rhythm, T wave abnormality inferior leads           As reviewed and Interpreted by me      Imaging Results Available and Reviewed while in ED:   No results found.    X-ray chest 23:04      IMPRESSION:  -Mild cardiomegaly and central pulmonary vascular congestion.  -No focal pulmonary consolidation, pleural effusion, or pneumothorax.  -No  acute osseous abnormality.      Case dictated and faxed at 12:23 AM EST.  If you would like to discuss this case directly please call [phone]  ext [phone].  If you can't reach me at this number, do not leave a voicemail.  Please call [phone] ext [phone] and ask for the next available Radiologist.     Eris Kelly MD  This report has been electronically signed and verified by the Radiologist whose name is printed above.  My review and independent interpretation of XR images: no infiltrate. Radiology report corroborates this in addition to other details as reported by them.      Decision rules/scores evaluated: none      Diagnostic labs/tests considered but not ordered: none    ED Medications Administered:   Medications   sodium chloride 0.9 % IV bolus 1,000 mL (1,000 mL Intravenous New Bag 6/29/24 2327)   famotidine (Pepcid) 20 mg/2mL injection 20 mg (20 mg Intravenous Given 6/29/24 2327)   acetaminophen (Tylenol Extra Strength) tab 1,000 mg (1,000 mg Oral Given 6/29/24 2330)   diphenhydrAMINE (Benadryl) 50 mg/mL  injection 25 mg (25 mg Intravenous Given 6/29/24 2329)   metoclopramide (Reglan) 5 mg/mL injection 5 mg (5 mg Intravenous Given 6/29/24 2328)            - pt feeling improved    MDM       Medical Decision Making      Differential Diagnosis: After obtaining the patient's history, performing the physical exam and reviewing the diagnostics, multiple initial diagnoses were considered based on the presenting problem including ACS, GERD, PUD, gastritis, medication side effect    External document review: I personally reviewed available external medical records for any recent pertinent discharge summaries, testing, and procedures - the findings are as follows: 5/8/24 visit with Dr. Jenkins for colonoscopy    Complicating Factors: The patient already  has a past medical history of Anxiety state, Depression, History of blood transfusion, Osteoarthritis, and Visual impairment. to contribute to the complexity of  this ED evaluation.    Procedures performed: none    Discussed management with physician/appropriate source: none    Considered admission/deescalation of care for: chest pain, abdominal pain    Social determinants of health affecting patient care: none    Prescription medications considered: pepcid, protonix, zofran, discussed continuing current medication regimen    The patient requires continuous monitoring for: chest pain, abdominal paina    Shared decision making: discussed possible admission        Disposition and Plan     Clinical Impression:  1. Gastroesophageal reflux disease with esophagitis, unspecified whether hemorrhage        Disposition:  Discharge    Follow-up:  Wilver Saldana MD  [address] [address]  [phone]    Follow up        Medications Prescribed:  Current Discharge Medication List        START taking these medications    Details   famotidine (PEPCID) 20 MG Oral Tab Take 1 tablet (20 mg total) by mouth 2 (two) times daily as needed for Heartburn.  Qty: 30 tablet, Refills: 0      ondansetron 4 MG Oral Tablet Dispersible Take 1 tablet (4 mg total) by mouth every 4 (four) hours as needed for Nausea.  Qty: 10 tablet, Refills: 0      pantoprazole 20 MG Oral Tab EC Take 1 tablet (20 mg total) by mouth daily.  Qty: 30 tablet, Refills: 0

## 2024-06-30 NOTE — ED INITIAL ASSESSMENT (HPI)
Patient arrives with son who is translating per request. Patient reports chest pressure, dizziness and black stool x 2 days.

## 2024-06-30 NOTE — ED QUICK NOTES
Patient A&OX4, denies SOB/CP/Dizziness. No pain. Discharge instructions given. All questions answered. Ambulatory home with son.

## 2025-06-24 ENCOUNTER — APPOINTMENT (OUTPATIENT)
Dept: GENERAL RADIOLOGY | Facility: HOSPITAL | Age: 55
End: 2025-06-24
Attending: EMERGENCY MEDICINE
Payer: MEDICARE

## 2025-06-24 ENCOUNTER — HOSPITAL ENCOUNTER (INPATIENT)
Facility: HOSPITAL | Age: 55
LOS: 5 days | Discharge: HOME | End: 2025-06-29
Attending: EMERGENCY MEDICINE | Admitting: HOSPITALIST
Payer: MEDICARE

## 2025-06-24 ENCOUNTER — APPOINTMENT (OUTPATIENT)
Dept: CT IMAGING | Facility: HOSPITAL | Age: 55
End: 2025-06-24
Attending: EMERGENCY MEDICINE
Payer: MEDICARE

## 2025-06-24 DIAGNOSIS — N10 ACUTE PYELONEPHRITIS: Primary | ICD-10-CM

## 2025-06-24 LAB
ALBUMIN SERPL-MCNC: 4.3 G/DL (ref 3.2–4.8)
ALBUMIN/GLOB SERPL: 1.8 {RATIO} (ref 1–2)
ALP LIVER SERPL-CCNC: 104 U/L (ref 46–118)
ALT SERPL-CCNC: 60 U/L (ref 10–49)
ANION GAP SERPL CALC-SCNC: 9 MMOL/L (ref 0–18)
AST SERPL-CCNC: 39 U/L (ref ?–34)
BASOPHILS # BLD AUTO: 0.06 X10(3) UL (ref 0–0.2)
BASOPHILS NFR BLD AUTO: 0.3 %
BILIRUB SERPL-MCNC: 1 MG/DL (ref 0.3–1.2)
BILIRUB UR QL: NEGATIVE
BUN BLD-MCNC: 11 MG/DL (ref 9–23)
BUN/CREAT SERPL: 12.8 (ref 10–20)
CALCIUM BLD-MCNC: 9.3 MG/DL (ref 8.7–10.4)
CHLORIDE SERPL-SCNC: 101 MMOL/L (ref 98–112)
CLARITY UR: CLEAR
CO2 SERPL-SCNC: 24 MMOL/L (ref 21–32)
COLOR UR: YELLOW
CREAT BLD-MCNC: 0.86 MG/DL (ref 0.55–1.02)
DEPRECATED RDW RBC AUTO: 47.8 FL (ref 35.1–46.3)
EGFRCR SERPLBLD CKD-EPI 2021: 79 ML/MIN/1.73M2 (ref 60–?)
EOSINOPHIL # BLD AUTO: 0.04 X10(3) UL (ref 0–0.7)
EOSINOPHIL NFR BLD AUTO: 0.2 %
ERYTHROCYTE [DISTWIDTH] IN BLOOD BY AUTOMATED COUNT: 14.7 % (ref 11–15)
EST. AVERAGE GLUCOSE BLD GHB EST-MCNC: 137 MG/DL (ref 68–126)
FLUAV + FLUBV RNA SPEC NAA+PROBE: NEGATIVE
FLUAV + FLUBV RNA SPEC NAA+PROBE: NEGATIVE
GLOBULIN PLAS-MCNC: 2.4 G/DL (ref 2–3.5)
GLUCOSE BLD-MCNC: 142 MG/DL (ref 70–99)
GLUCOSE BLDC GLUCOMTR-MCNC: 103 MG/DL (ref 70–99)
GLUCOSE UR-MCNC: NEGATIVE MG/DL
HBA1C MFR BLD: 6.4 % (ref ?–5.7)
HCT VFR BLD AUTO: 40.1 % (ref 35–48)
HGB BLD-MCNC: 13.1 G/DL (ref 12–16)
IMM GRANULOCYTES # BLD AUTO: 0.15 X10(3) UL (ref 0–1)
IMM GRANULOCYTES NFR BLD: 0.8 %
KETONES UR-MCNC: 80 MG/DL
LACTATE SERPL-SCNC: 1.6 MMOL/L (ref 0.5–2)
LIPASE SERPL-CCNC: 19 U/L (ref 12–53)
LYMPHOCYTES # BLD AUTO: 0.82 X10(3) UL (ref 1–4)
LYMPHOCYTES NFR BLD AUTO: 4.5 %
MCH RBC QN AUTO: 28.9 PG (ref 26–34)
MCHC RBC AUTO-ENTMCNC: 32.7 G/DL (ref 31–37)
MCV RBC AUTO: 88.5 FL (ref 80–100)
MONOCYTES # BLD AUTO: 1.48 X10(3) UL (ref 0.1–1)
MONOCYTES NFR BLD AUTO: 8.1 %
NEUTROPHILS # BLD AUTO: 15.75 X10 (3) UL (ref 1.5–7.7)
NEUTROPHILS # BLD AUTO: 15.75 X10(3) UL (ref 1.5–7.7)
NEUTROPHILS NFR BLD AUTO: 86.1 %
NITRITE UR QL STRIP.AUTO: POSITIVE
OSMOLALITY SERPL CALC.SUM OF ELEC: 280 MOSM/KG (ref 275–295)
PH UR: 6 [PH] (ref 5–8)
PLATELET # BLD AUTO: 248 10(3)UL (ref 150–450)
POTASSIUM SERPL-SCNC: 4.1 MMOL/L (ref 3.5–5.1)
PROT SERPL-MCNC: 6.7 G/DL (ref 5.7–8.2)
RBC # BLD AUTO: 4.53 X10(6)UL (ref 3.8–5.3)
RBC #/AREA URNS AUTO: >10 /HPF
RSV RNA SPEC NAA+PROBE: NEGATIVE
SARS-COV-2 RNA RESP QL NAA+PROBE: NOT DETECTED
SODIUM SERPL-SCNC: 134 MMOL/L (ref 136–145)
SP GR UR STRIP: 1.01 (ref 1–1.03)
TROPONIN I SERPL HS-MCNC: 3 NG/L (ref ?–34)
UROBILINOGEN UR STRIP-ACNC: 2
WBC # BLD AUTO: 18.3 X10(3) UL (ref 4–11)

## 2025-06-24 PROCEDURE — 74177 CT ABD & PELVIS W/CONTRAST: CPT | Performed by: EMERGENCY MEDICINE

## 2025-06-24 PROCEDURE — 71045 X-RAY EXAM CHEST 1 VIEW: CPT | Performed by: EMERGENCY MEDICINE

## 2025-06-24 PROCEDURE — 99222 1ST HOSP IP/OBS MODERATE 55: CPT | Performed by: HOSPITALIST

## 2025-06-24 RX ORDER — ERGOCALCIFEROL 1.25 MG/1
50000 CAPSULE, LIQUID FILLED ORAL WEEKLY
COMMUNITY

## 2025-06-24 RX ORDER — VANCOMYCIN HYDROCHLORIDE 125 MG/1
125 CAPSULE ORAL DAILY
Status: DISCONTINUED | OUTPATIENT
Start: 2025-06-24 | End: 2025-06-29

## 2025-06-24 RX ORDER — MORPHINE SULFATE 2 MG/ML
1 INJECTION, SOLUTION INTRAMUSCULAR; INTRAVENOUS EVERY 2 HOUR PRN
Status: DISCONTINUED | OUTPATIENT
Start: 2025-06-24 | End: 2025-06-29

## 2025-06-24 RX ORDER — ONDANSETRON 2 MG/ML
4 INJECTION INTRAMUSCULAR; INTRAVENOUS ONCE
Status: COMPLETED | OUTPATIENT
Start: 2025-06-24 | End: 2025-06-24

## 2025-06-24 RX ORDER — MORPHINE SULFATE 4 MG/ML
4 INJECTION, SOLUTION INTRAMUSCULAR; INTRAVENOUS EVERY 2 HOUR PRN
Status: DISCONTINUED | OUTPATIENT
Start: 2025-06-24 | End: 2025-06-29

## 2025-06-24 RX ORDER — ACETAMINOPHEN 500 MG
1000 TABLET ORAL ONCE
Status: COMPLETED | OUTPATIENT
Start: 2025-06-24 | End: 2025-06-24

## 2025-06-24 RX ORDER — SODIUM CHLORIDE 9 MG/ML
INJECTION, SOLUTION INTRAVENOUS CONTINUOUS
Status: DISCONTINUED | OUTPATIENT
Start: 2025-06-24 | End: 2025-06-24

## 2025-06-24 RX ORDER — IBUPROFEN 400 MG/1
400 TABLET, FILM COATED ORAL ONCE
Status: COMPLETED | OUTPATIENT
Start: 2025-06-24 | End: 2025-06-24

## 2025-06-24 RX ORDER — ONDANSETRON 2 MG/ML
4 INJECTION INTRAMUSCULAR; INTRAVENOUS EVERY 6 HOURS PRN
Status: DISCONTINUED | OUTPATIENT
Start: 2025-06-24 | End: 2025-06-29

## 2025-06-24 RX ORDER — ONDANSETRON 4 MG/1
4 TABLET, ORALLY DISINTEGRATING ORAL EVERY 12 HOURS PRN
COMMUNITY
Start: 2024-11-20

## 2025-06-24 RX ORDER — BUPROPION HYDROCHLORIDE 150 MG/1
150 TABLET, EXTENDED RELEASE ORAL DAILY
COMMUNITY

## 2025-06-24 RX ORDER — TEMAZEPAM 15 MG/1
15 CAPSULE ORAL NIGHTLY PRN
Status: DISCONTINUED | OUTPATIENT
Start: 2025-06-24 | End: 2025-06-29

## 2025-06-24 RX ORDER — ENOXAPARIN SODIUM 100 MG/ML
40 INJECTION SUBCUTANEOUS DAILY
Status: DISCONTINUED | OUTPATIENT
Start: 2025-06-25 | End: 2025-06-29

## 2025-06-24 RX ORDER — MORPHINE SULFATE 2 MG/ML
2 INJECTION, SOLUTION INTRAMUSCULAR; INTRAVENOUS EVERY 2 HOUR PRN
Status: DISCONTINUED | OUTPATIENT
Start: 2025-06-24 | End: 2025-06-29

## 2025-06-24 RX ORDER — SEMAGLUTIDE 0.68 MG/ML
0.25 INJECTION, SOLUTION SUBCUTANEOUS WEEKLY
COMMUNITY
Start: 2025-06-23

## 2025-06-24 RX ORDER — ACETAMINOPHEN 500 MG
500 TABLET ORAL EVERY 4 HOURS PRN
Status: DISCONTINUED | OUTPATIENT
Start: 2025-06-24 | End: 2025-06-29

## 2025-06-24 RX ORDER — KETOROLAC TROMETHAMINE 15 MG/ML
15 INJECTION, SOLUTION INTRAMUSCULAR; INTRAVENOUS ONCE
Status: COMPLETED | OUTPATIENT
Start: 2025-06-24 | End: 2025-06-24

## 2025-06-24 RX ORDER — PROCHLORPERAZINE EDISYLATE 5 MG/ML
5 INJECTION INTRAMUSCULAR; INTRAVENOUS EVERY 8 HOURS PRN
Status: DISCONTINUED | OUTPATIENT
Start: 2025-06-24 | End: 2025-06-29

## 2025-06-24 RX ORDER — SODIUM CHLORIDE 9 MG/ML
150 INJECTION, SOLUTION INTRAVENOUS CONTINUOUS
Status: DISCONTINUED | OUTPATIENT
Start: 2025-06-24 | End: 2025-06-29

## 2025-06-24 NOTE — ED INITIAL ASSESSMENT (HPI)
Patient presents to ED via triage, in WC, with c/o headache, nausea, vomiting and abdominal pain since Sunday. Denies diarrhea. + fever. No OTC medication taken.

## 2025-06-24 NOTE — ED QUICK NOTES
Orders for admission, patient is aware of plan and ready to go upstairs. Any questions, please call ED RN Tanya at extension [phone].     Patient Covid vaccination status: Fully vaccinated     COVID Test Ordered in ED: SARS-CoV-2/Flu A and B/RSV by PCR (GeneXpert)    COVID Suspicion at Admission: N/A    Running Infusions: Medication Infusions[1] None    Mental Status/LOC at time of transport: AOx3    Other pertinent information:   CIWA score: N/A   NIH score:  N/A             [1]

## 2025-06-24 NOTE — ED PROVIDER NOTES
Patient Seen in: [facility] Emergency Department        History  Chief Complaint   Patient presents with    Nausea/Vomiting/Diarrhea     Stated Complaint: vomiting    Subjective:   HPI            55-year-old female with history of anxiety, depression, and severe obesity presents with complaints of nausea, vomiting, headaches, body aches, subjective fever, and chills.  The patient reports symptoms over the past 3 days.  Describes a diffuse headache worsened today.  She also reports heartburn with a burning pain to her upper chest and throat.  She denies cough or dyspnea.  No diarrhea.  She denies dysuria or hematuria.  She began Ozempic 3 weeks ago but was feeling well until present symptoms beginning 2 days ago.  No known sick contacts.  History is obtained through the patient's son at the bedside as the patient does not speak English.  She declines  services.      Objective:     Past Medical History:    Anxiety state    Depression    no longer taking meds     History of blood transfusion    25 yrs ago;no reactions    Osteoarthritis    Visual impairment    eyeglasses              Past Surgical History:   Procedure Laterality Date          Cholecystectomy      Total hip replacement Left     Total knee replacement Right 2019    Total knee replacement Left 2020                Social History     Socioeconomic History    Marital status:    Tobacco Use    Smoking status: Never    Smokeless tobacco: Never   Vaping Use    Vaping status: Never Used   Substance and Sexual Activity    Alcohol use: No    Drug use: No     Social Drivers of Health     Food Insecurity: No Food Insecurity (2025)    Received from [facility]    Food Insecurity     Currently or in the past 3 months, have you worried your food would run out before you had money to buy more?: No     In the past 12 months, have you run out of food or been unable to get more?: No   Transportation  Needs: No Transportation Needs (2/20/2025)    Received from [facility]    Transportation Needs     Currently or in the past 3 months, has lack of transportation kept you from medical appointments, getting food or medicine, or providing care to a family member?: No   Housing Stability: Low Risk  (4/4/2024)    Received from [facility]    Housing Stability Vital Sign     Unable to Pay for Housing in the Last Year: No     Number of Places Lived in the Last Year: 1     Unstable Housing in the Last Year: No                                Physical Exam    ED Triage Vitals [06/24/25 1300]   /66   Pulse (!) 140   Resp 20   Temp 99.5 °F (37.5 °C)   Temp src Oral   SpO2 94 %   O2 Device None (Room air)       Current Vitals:   Vital Signs  BP: 119/66  Pulse: 113  Resp: 20  Temp: 99.5 °F (37.5 °C)  Temp src: Oral    Oxygen Therapy  SpO2: 95 %  O2 Device: None (Room air)            Physical Exam    General Appearance: alert, no distress  Eyes: pupils equal and round no pallor or injection  ENT, Mouth: mucous membranes moist  Respiratory: there are no retractions, lungs are clear to auscultation  Cardiovascular: Tachycardic, regular rhythm  Gastrointestinal:  abdomen is soft with diffuse tenderness to palpation, no masses, bowel sounds normal  Neurological: Speech normal.  Moving extremities x 4.  Skin: warm and dry, no rashes.  Musculoskeletal: neck is supple non tender        Extremities are symmetrical, full range of motion  Psychiatric: patient is oriented X 3, there is no agitation    DIFFERENTIAL DIAGNOSIS: After history and physical exam differential diagnosis was considered for viral illness, intra-abdominal infection such as appendicitis or diverticulitis, pulmonary infectious process, urinary tract infection, or other            ED Course  Labs Reviewed   CBC WITH DIFFERENTIAL WITH PLATELET - Abnormal; Notable for the following components:       Result Value    WBC 18.3 (*)      RDW-SD 47.8 (*)     Neutrophil Absolute Prelim 15.75 (*)     Neutrophil Absolute 15.75 (*)     Lymphocyte Absolute 0.82 (*)     Monocyte Absolute 1.48 (*)     All other components within normal limits   COMP METABOLIC PANEL (14) - Abnormal; Notable for the following components:    Glucose 142 (*)     Sodium 134 (*)     ALT 60 (*)     AST 39 (*)     All other components within normal limits   URINALYSIS WITH CULTURE REFLEX - Abnormal; Notable for the following components:    Ketones Urine 80 (*)     Blood Urine Moderate (*)     Protein Urine 100 mg/dL (*)     Urobilinogen Urine 2.0 (*)     Nitrite Urine Positive (*)     Leukocyte Esterase Urine Small (*)     All other components within normal limits   UA MICROSCOPIC ONLY, URINE - Abnormal; Notable for the following components:    WBC Urine 21-50 (*)     RBC Urine >10 (*)     Bacteria Urine 3+ (*)     Squamous Epi. Cells Few (*)     All other components within normal limits   LIPASE - Normal   TROPONIN I HIGH SENSITIVITY - Normal   LACTIC ACID, PLASMA - Normal   SARS-COV-2/FLU A AND B/RSV BY PCR (GENEXPERT) - Normal    Narrative:     This test is intended for the qualitative detection and differentiation of SARS-CoV-2, influenza A, influenza B, and respiratory syncytial virus (RSV) viral RNA in nasopharyngeal or nares swabs from individuals suspected of respiratory viral infection consistent with COVID-19 by their healthcare provider. Signs and symptoms of respiratory viral infection due to SARS-CoV-2, influenza, and RSV can be similar.    Test performed using the Xpert Xpress SARS-CoV-2/FLU/RSV (real time RT-PCR)  assay on the GeneXpert instrument, [employer], [place] [address].   This test is being used under the Food and Drug Administration's Emergency Use Authorization.    The authorized Fact Sheet for Healthcare Providers for this assay is available upon request from the laboratory.   RAINBOW DRAW BLUE   BLOOD CULTURE   BLOOD CULTURE   URINE CULTURE, ROUTINE      EKG    Rate, intervals and axes as noted on EKG Report.  Rate: 136  Rhythm: Sinus Rhythm  Axis: Normal  Reading: Nonspecific EKG                              MDM     Lab and CT results noted.  Patient with acute pyelonephritis.  Continues with tachycardia with heart rate of 120.  No longer vomiting.  Will admit for IV fluids and IV antibiotics.  Discussed with Dr. Smith, hospitalist.    Admission disposition: 6/24/2025  6:40 PM           Medical Decision Making      Disposition and Plan     Clinical Impression:  1. Acute pyelonephritis         Disposition:  Admit  6/24/2025  6:40 pm    Follow-up:  No follow-up provider specified.        Medications Prescribed:  Current Discharge Medication List                Supplementary Documentation:         Hospital Problems       Present on Admission  Date Reviewed: 6/3/2021          ICD-10-CM Noted POA    * (Principal) Acute pyelonephritis N10 6/24/2025 Unknown

## 2025-06-25 LAB
ANION GAP SERPL CALC-SCNC: 11 MMOL/L (ref 0–18)
ATRIAL RATE: 136 BPM
ATRIAL RATE: 142 BPM
BASOPHILS # BLD: 0 X10(3) UL (ref 0–0.2)
BASOPHILS NFR BLD: 0 %
BUN BLD-MCNC: 11 MG/DL (ref 9–23)
BUN/CREAT SERPL: 14.9 (ref 10–20)
CALCIUM BLD-MCNC: 6.9 MG/DL (ref 8.7–10.4)
CHLORIDE SERPL-SCNC: 110 MMOL/L (ref 98–112)
CO2 SERPL-SCNC: 20 MMOL/L (ref 21–32)
CREAT BLD-MCNC: 0.74 MG/DL (ref 0.55–1.02)
DEPRECATED RDW RBC AUTO: 50.2 FL (ref 35.1–46.3)
EGFRCR SERPLBLD CKD-EPI 2021: 95 ML/MIN/1.73M2 (ref 60–?)
EOSINOPHIL # BLD: 0.2 X10(3) UL (ref 0–0.7)
EOSINOPHIL NFR BLD: 1 %
ERYTHROCYTE [DISTWIDTH] IN BLOOD BY AUTOMATED COUNT: 15.3 % (ref 11–15)
GLUCOSE BLD-MCNC: 124 MG/DL (ref 70–99)
GLUCOSE BLDC GLUCOMTR-MCNC: 110 MG/DL (ref 70–99)
GLUCOSE BLDC GLUCOMTR-MCNC: 111 MG/DL (ref 70–99)
GLUCOSE BLDC GLUCOMTR-MCNC: 115 MG/DL (ref 70–99)
GLUCOSE BLDC GLUCOMTR-MCNC: 132 MG/DL (ref 70–99)
GLUCOSE BLDC GLUCOMTR-MCNC: 138 MG/DL (ref 70–99)
HCT VFR BLD AUTO: 32.5 % (ref 35–48)
HGB BLD-MCNC: 10.4 G/DL (ref 12–16)
LACTATE SERPL-SCNC: 1.8 MMOL/L (ref 0.5–2)
LYMPHOCYTES NFR BLD: 1.76 X10(3) UL (ref 1–4)
LYMPHOCYTES NFR BLD: 9 %
MCH RBC QN AUTO: 28.4 PG (ref 26–34)
MCHC RBC AUTO-ENTMCNC: 32 G/DL (ref 31–37)
MCV RBC AUTO: 88.8 FL (ref 80–100)
METAMYELOCYTES # BLD: 0.2 X10(3) UL (ref ?–0.01)
METAMYELOCYTES NFR BLD: 1 %
MONOCYTES # BLD: 0.59 X10(3) UL (ref 0.1–1)
MONOCYTES NFR BLD: 3 %
MORPHOLOGY: NORMAL
NEUTROPHILS # BLD AUTO: 17.22 X10 (3) UL (ref 1.5–7.7)
NEUTROPHILS NFR BLD: 59 %
NEUTS BAND NFR BLD: 27 %
NEUTS HYPERSEG # BLD: 16.86 X10(3) UL (ref 1.5–7.7)
OSMOLALITY SERPL CALC.SUM OF ELEC: 293 MOSM/KG (ref 275–295)
P AXIS: 31 DEGREES
P AXIS: 9 DEGREES
P-R INTERVAL: 104 MS
P-R INTERVAL: 132 MS
PLATELET # BLD AUTO: 194 10(3)UL (ref 150–450)
PLATELET MORPHOLOGY: NORMAL
POTASSIUM SERPL-SCNC: 2.8 MMOL/L (ref 3.5–5.1)
POTASSIUM SERPL-SCNC: 3.1 MMOL/L (ref 3.5–5.1)
Q-T INTERVAL: 298 MS
Q-T INTERVAL: 368 MS
QRS DURATION: 84 MS
QRS DURATION: 84 MS
QTC CALCULATION (BEZET): 448 MS
QTC CALCULATION (BEZET): 566 MS
R AXIS: 71 DEGREES
R AXIS: 75 DEGREES
RBC # BLD AUTO: 3.66 X10(6)UL (ref 3.8–5.3)
SODIUM SERPL-SCNC: 141 MMOL/L (ref 136–145)
T AXIS: 1 DEGREES
T AXIS: 16 DEGREES
TOTAL CELLS COUNTED BLD: 100
VENTRICULAR RATE: 136 BPM
VENTRICULAR RATE: 142 BPM
WBC # BLD AUTO: 19.6 X10(3) UL (ref 4–11)

## 2025-06-25 PROCEDURE — 99291 CRITICAL CARE FIRST HOUR: CPT | Performed by: HOSPITALIST

## 2025-06-25 RX ORDER — POTASSIUM CHLORIDE 14.9 MG/ML
20 INJECTION INTRAVENOUS ONCE
Status: COMPLETED | OUTPATIENT
Start: 2025-06-25 | End: 2025-06-25

## 2025-06-25 RX ORDER — CALCIUM GLUCONATE 10 MG/ML
1 INJECTION, SOLUTION INTRAVENOUS ONCE
Status: COMPLETED | OUTPATIENT
Start: 2025-06-25 | End: 2025-06-25

## 2025-06-25 RX ORDER — KETOROLAC TROMETHAMINE 30 MG/ML
30 INJECTION, SOLUTION INTRAMUSCULAR; INTRAVENOUS ONCE
Status: COMPLETED | OUTPATIENT
Start: 2025-06-25 | End: 2025-06-25

## 2025-06-25 RX ORDER — KETOROLAC TROMETHAMINE 15 MG/ML
INJECTION, SOLUTION INTRAMUSCULAR; INTRAVENOUS
Status: COMPLETED
Start: 2025-06-25 | End: 2025-06-25

## 2025-06-25 RX ORDER — IBUPROFEN 400 MG/1
400 TABLET, FILM COATED ORAL EVERY 6 HOURS PRN
Status: DISCONTINUED | OUTPATIENT
Start: 2025-06-25 | End: 2025-06-26

## 2025-06-25 NOTE — OCCUPATIONAL THERAPY NOTE
OCCUPATIONAL THERAPY EVALUATION - INPATIENT     Room Number: 533/533-A  Evaluation Date: 6/25/2025  Type of Evaluation: Initial   Presenting problem: acute pyelonephritis  Co-morbidities: Hypertension; depression; anxiety; generalized osteoarthritis; C difficile infection; small-bowel obstruction, treated conservatively     Physician Order: IP Consult to Occupational Therapy  Reason for Therapy: ADL/IADL Dysfunction and Discharge Planning    OCCUPATIONAL THERAPY ASSESSMENT   Patient is a 55 year old female admitted 6/24/2025 for acute pyelonephritis.  Prior to admission, patient's baseline is independent with ADLs and mod I for mobility using RW for community distances.  Patient is currently functioning below baseline with toileting, bathing, lower body dressing, bed mobility, transfers, dynamic standing balance, and functional standing tolerance.  Patient is requiring supervision, stand-by assist, and contact guard assist as a result of the following impairments: decreased functional strength, decreased endurance, impaired standing balance, decreased muscular endurance, and medical status. Occupational Therapy will continue to follow for duration of hospitalization.    Patient will benefit from continued skilled OT Services at discharge to promote prior level of function and safety with additional support and return home with home health OT.    PLAN DURING HOSPITALIZATION  OT Device Recommendations: TBD  OT Treatment Plan: Balance activities, Energy conservation/work simplification techniques, ADL training, Functional transfer training, Endurance training, Patient/Family education, Patient/Family training, Compensatory technique education     OCCUPATIONAL THERAPY MEDICAL/SOCIAL HISTORY   Problem List  Principal Problem:    Acute pyelonephritis    HOME SITUATION  Type of Home: Apartment  Home Layout: One level  Lives With: Spouse; Son (mother)  Toilet and Equipment: Standard height toilet  Shower/Tub and Equipment:  Walk-in shower; Shower chair  Drives: Yes  Patient Regularly Uses: Rolling walker (walk in shower, shower bench)    Prior Level of [place]: Prior to admission pt was independent with ADLs and mod I for mobility using a RW community distances.     SUBJECTIVE  \"I would like to sit up in the chair.\"     OCCUPATIONAL THERAPY EXAMINATION      OBJECTIVE  Precautions: Bed/chair alarm  Fall Risk: Standard fall risk      PAIN ASSESSMENT  Ratin      ACTIVITY TOLERANCE           BP: (!) 139/91  BP Location: Right arm  BP Method: Automatic  Patient Position: Sitting    COGNITION  Overall Cognitive Status:  WFL - within functional limits    Communication: Pt is [place]ian speaking.    RANGE OF MOTION   Upper extremity ROM is within functional limits     STRENGTH ASSESSMENT  Upper extremity strength is within functional limits     COORDINATION  Gross Motor: WFL   Fine Motor: WFL     ACTIVITIES OF DAILY LIVING ASSESSMENT  AM-PAC ‘6-Clicks’ Inpatient Daily Activity Short Form  How much help from another person does the patient currently need…  -   Putting on and taking off regular lower body clothing?: A Little  -   Bathing (including washing, rinsing, drying)?: A Little  -   Toileting, which includes using toilet, bedpan or urinal? : A Little  -   Putting on and taking off regular upper body clothing?: None  -   Taking care of personal grooming such as brushing teeth?: A Little  -   Eating meals?: None    AM-PAC Score:  Score: 20  Approx Degree of Impairment: 38.32%  Standardized Score (AM-PAC Scale): 42.03  CMS Modifier (G-Code): CJ    FUNCTIONAL TRANSFER ASSESSMENT  Sit to Stand: Edge of Bed  Edge of Bed: Contact Guard Assist  Toilet Transfer: Contact Guard Assist    FUNCTIONAL MOBILITY  Pt required CGA with RW support to complete bathroom distance mobility to simulate household distances.     BED MOBILITY  Supine to Sit : Contact Guard Assist    BALANCE ASSESSMENT  Static Sitting: Supervision  Static Standing: Contact  Guard Assist  Dynamic sitting: SBA  Dynamic Standing: CGA     FUNCTIONAL ADL ASSESSMENT  Grooming Standing: Contact Guard Assist  LB Dressing Standing: Contact Guard Assist  Toileting Seated: Stand-by Assist    Skilled Therapy Provided:   RN cleared pt for participation. Session coordinated with PT. Pt received in bed with no visitors present. Pt agreeable to participation in therapy. To assess pt's participation during tasks while also providing intermittent education to maximize participation, OT facilitated the following: bed mobility, functional transfers, functional mobility, and adl tasks. Pt tolerated treatment fairly well and completed all tasks with assist levels listed above. Pt reported dizziness with supine to sitting EOB transition; vitals monitored and pt reporting improvement of symptoms with increased time sitting EOB. Pt benefited from cues for RW management and safety; pt demo good carry-over to ed. Pt remained in recliner with all needs in reach and alarm on at end of session. RN aware.       EDUCATION PROVIDED  Patient Education : Role of Occupational Therapy; Plan of Care; Functional Transfer Techniques; Fall Prevention; Posture/Positioning; Energy Conservation; Proper Body Mechanics  Patient's Response to Education: Verbalized Understanding; Returned Demonstration    The patient's Approx Degree of Impairment: 38.32% has been calculated based on documentation in the [facility] '6 clicks' Inpatient Daily Activity Short Form.  Research supports that patients with this level of impairment may benefit from HHOT.  Final disposition will be made by interdisciplinary medical team.     Patient End of Session: Up in chair, Needs met, Call light within reach, RN aware of session/findings, All patient questions and concerns addressed, Hospital anti-slip socks, Alarm set    OT Goals  Patients self stated goal is: none stated     Patient will complete functional transfer with mod I   Comment:     Patient will  complete toileting with mod I   Comment:     Patient will tolerate standing for 5 minutes in prep for adls with mod I    Comment:    Patient will complete item retrieval with mod I   Comment:          Goals  on: 25  Frequency: 3-5x/week    Patient Evaluation Complexity Level:   Occupational Profile/Medical History MODERATE - Expanded review of history including review of medical or therapy record   Specific performance deficits impacting engagement in ADL/IADL MODERATE  3 - 5 performance deficits   Client Assessment/Performance Deficits MODERATE - Comorbidities and min to mod modifications of tasks    Clinical Decision Making MODERATE - Analysis of occupational profile, detailed assessments, several treatment options    Overall Complexity MODERATE     OT Session Time: 28 minutes  Self-Care Home Management: 28 minutes

## 2025-06-25 NOTE — PLAN OF CARE
Problem: Diabetes/Glucose Control  Goal: Glucose maintained within prescribed range  Description: INTERVENTIONS:  - Monitor Blood Glucose as ordered  - Assess for signs and symptoms of hyperglycemia and hypoglycemia  - Administer ordered medications to maintain glucose within target range  - Assess barriers to adequate nutritional intake and initiate nutrition consult as needed  - Instruct patient on self management of diabetes  Outcome: Progressing     Problem: Patient Centered Care  Goal: Patient preferences are identified and integrated in the patient's plan of care  Description: Interventions:  - What would you like us to know as we care for you?   - Provide timely, complete, and accurate information to patient/family  - Incorporate patient and family knowledge, values, beliefs, and cultural backgrounds into the planning and delivery of care  - Encourage patient/family to participate in care and decision-making at the level they choose  - Honor patient and family perspectives and choices  Outcome: Progressing     Problem: RISK FOR INFECTION - ADULT  Goal: Absence of fever/infection during anticipated neutropenic period  Description: INTERVENTIONS  - Monitor WBC  - Administer growth factors as ordered  - Implement neutropenic guidelines  Outcome: Progressing     Problem: GASTROINTESTINAL - ADULT  Goal: Minimal or absence of nausea and vomiting  Description: INTERVENTIONS:  - Maintain adequate hydration with IV or PO as ordered and tolerated  - Nasogastric tube to low intermittent suction as ordered  - Evaluate effectiveness of ordered antiemetic medications  - Provide nonpharmacologic comfort measures as appropriate  - Advance diet as tolerated, if ordered  - Obtain nutritional consult as needed  - Evaluate fluid balance  Outcome: Progressing     Problem: METABOLIC/FLUID AND ELECTROLYTES - ADULT  Goal: Glucose maintained within prescribed range  Description: INTERVENTIONS:  - Monitor Blood Glucose as ordered  -  Assess for signs and symptoms of hyperglycemia and hypoglycemia  - Administer ordered medications to maintain glucose within target range  - Assess barriers to adequate nutritional intake and initiate nutrition consult as needed  - Instruct patient on self management of diabetes  Outcome: Progressing  Goal: Electrolytes maintained within normal limits  Description: INTERVENTIONS:  - Monitor labs and rhythm and assess patient for signs and symptoms of electrolyte imbalances  - Administer electrolyte replacement as ordered  - Monitor response to electrolyte replacements, including rhythm and repeat lab results as appropriate  - Fluid restriction as ordered  - Instruct patient on fluid and nutrition restrictions as appropriate  Outcome: Progressing     Problem: Patient/Family Goals  Goal: Patient/Family Long Term Goal  Description: Patient's Long Term Goal: DC home    Interventions:  - - Monitor vitals  - Monitor appropriate labs  - Administer medications as ordered  - Follow MD's orders  - Update patient on plan of care   - Discharge planning     - See additional Care Plan goals for specific interventions  Outcome: Progressing  Goal: Patient/Family Short Term Goal  Description: Patient's Short Term Goal: feel better    Interventions:   - prn meds  -monitor vitals  -iv antibiotics  - See additional Care Plan goals for specific interventions  Outcome: Progressing

## 2025-06-25 NOTE — PHYSICAL THERAPY NOTE
PHYSICAL THERAPY EVALUATION - INPATIENT     Room Number: 533/533-A  Evaluation Date: 6/25/2025  Type of Evaluation: Initial   Physician Order: PT Eval and Treat    Presenting Problem: fevers, aches, chills  Co-Morbidities : Hypertension; depression; anxiety; generalized osteoarthritis; C difficile infection; small-bowel obstruction, treated conservatively.  Reason for Therapy: Mobility Dysfunction and Discharge Planning    PHYSICAL THERAPY ASSESSMENT   Patient is a 55 year old female admitted 6/24/2025 for fevers, aches, chills.  Prior to admission, patient's baseline is modified independent.  Patient is currently functioning near baseline with bed mobility, transfers, gait, stair negotiation, maintaining seated position, standing prolonged periods, and performing household tasks.  Patient is requiring contact guard assist as a result of the following impairments: decreased functional strength, decreased endurance/aerobic capacity, pain, impaired standing balance, decreased muscular endurance, and medical status.  Physical Therapy will continue to follow for duration of hospitalization.    Patient will benefit from continued skilled PT Services at discharge to promote prior level of function and safety with additional support and return home with home health PT.    PLAN DURING HOSPITALIZATION  Nursing Mobility Recommendation : 1 Assist     PT Treatment Plan: Bed mobility, Body mechanics, Coordination, Endurance, Energy conservation, Gait training, Strengthening, Stair training, Transfer training, Balance training  Rehab Potential : Fair  Frequency (Obs): 5x/week     PHYSICAL THERAPY MEDICAL/SOCIAL HISTORY   History related to current admission: The patient is a 55-year-old  female presented to the emergency department for evaluation of fevers, body aches, and chills.  CBC showed white blood cell count of 18.3 with left shift.  Chemistry was unremarkable.  Sodium 134.  Urinalysis showed evidence of urinary  tract infection.  CT scan of the abdomen and pelvis showed cystitis with ascending urinary tract infection and bilateral pyelonephritis, right more than left.  Started on IV Rocephin.  Patient had sinus tachycardia upon arrival and temperature of 102.9.  Blood and urine cultures were obtained.      Problem List  Principal Problem:    Acute pyelonephritis    HOME SITUATION  Type of Home: Apartment  Home Layout: One level  Stairs to Enter : 19   Railing: Yes              Lives With: Spouse, Son (mother)    Drives: Yes   Patient Regularly Uses: Rolling walker (walk in shower, shower bench)     Prior Level of [place]: Prior to admission patient was independent with performance of all ADL's and performance of functional mobility. RW for community ambulation.    SUBJECTIVE  \"Thank you for your help\"    PHYSICAL THERAPY EXAMINATION   OBJECTIVE  Precautions: Bed/chair alarm,  needed  Fall Risk: Standard fall risk    PAIN ASSESSMENT  Ratin    COGNITION  Overall Cognitive Status:  WFL - within functional limits  Arousal/Alertness:  appropriate responses to stimuli  Orientation Level:  oriented x4  Safety Judgement:  good awareness of safety precautions    RANGE OF MOTION AND STRENGTH ASSESSMENT  Upper extremity ROM and strength are within functional limits BUE.  Lower extremity ROM is within functional limits BLE.  Lower extremity strength is within functional limits BLE.    BALANCE  Static Sitting: Good  Dynamic Sitting: Fair  Static Standing: Fair  Dynamic Standing: Poor    ACTIVITY TOLERANCE  BP: (!) 139/91  BP Location: Right arm  BP Method: Automatic  Patient Position: Sitting    AM-PAC '6-Clicks' INPATIENT SHORT FORM - BASIC MOBILITY  How much difficulty does the patient currently have...  Patient Difficulty: Turning over in bed (including adjusting bedclothes, sheets and blankets)?: A Little   Patient Difficulty: Sitting down on and standing up from a chair with arms (e.g., wheelchair, bedside  commode, etc.): A Little   Patient Difficulty: Moving from lying on back to sitting on the side of the bed?: A Little   How much help from another person does the patient currently need...   Help from Another: Moving to and from a bed to a chair (including a wheelchair)?: A Little   Help from Another: Need to walk in hospital room?: A Little   Help from Another: Climbing 3-5 steps with a railing?: A Lot     AM-PAC Score:  Raw Score: 17   Approx Degree of Impairment: 50.57%   Standardized Score (AM-PAC Scale): 42.13   CMS Modifier (G-Code): CK    FUNCTIONAL ABILITY STATUS  Functional Mobility/Gait Assessment  Gait Assistance: Contact guard assist  Distance (ft): 15 feet x 1, 20 feet x 1  Assistive Device: Rolling walker  Pattern:  (decreased zaid, decreased step length, forward flexed posture, narrow base of support, verbal cues for sequencing and rolling walker management.)  Rolling: contact guard assist  Supine to Sit: minimal assist  Sit to Supine: not tested  Sit to Stand: contact guard assist    Exercise/Education Provided:  Education Provided To: Patient Patient Education: Role of Physical Therapy, Plan of Care, DME Recommendations, Discharge Recommendations, Fall Prevention, Functional Transfer Techniques, Posture/Positioning, Energy Conservation, Proper Body Mechanics, Gait Training Patient's Response to Education: Verbalized Understanding, Requires Further Education     Skilled Therapy Provided: Patient received supine in bed at initiation of session agreeable to participation in PT. Patient tolerates treatment well, able to perform supine to sit transfer with minimal assistance. Increased dizziness initially upon sitting, vitals taken and stable. Patient ambulates to bathroom with BUE support on rolling walker and CGA. Increased time spent in bathroom for hygiene/jamila care. Ambulates to bedside recliner with CGA. Patient left in bedside chair, lines intact, needs in reach and handoff to RN.    The  patient's Approx Degree of Impairment: 50.57% has been calculated based on documentation in the [facility] '6 clicks' Inpatient Basic Mobility Short Form.  Research supports that patients with this level of impairment may benefit from home.  Final disposition will be made by interdisciplinary medical team.    Patient End of Session: Up in chair, Needs met, Call light within reach, RN aware of session/findings, All patient questions and concerns addressed, Hospital anti-slip socks, Alarm set    CURRENT GOALS  Goals to be met by: 25  Patient Goal Patient's self-stated goal is: to return home   Goal #1 Patient is able to demonstrate supine - sit EOB @ level: modified independent     Goal #1   Current Status    Goal #2 Patient is able to demonstrate transfers Sit to/from Stand at assistance level: modified independent with least restrictive device.     Goal #2  Current Status    Goal #3 Patient is able to ambulate >150 feet with assist device: least restrictive device at assistance level: modified independent   Goal #3   Current Status    Goal #4 Patient will negotiate 19 stairs/one curb w/ assistive device and supervision   Goal #4   Current Status    Goal #5 Patient to demonstrate independence with home activity/exercise instructions provided to patient in preparation for discharge.   Goal #5   Current Status    Goal #6    Goal #6  Current Status      Patient Evaluation Complexity Level:  History High - 3 or more personal factors and/or co-morbidities   Examination of body systems Moderate - addressing a total of 3 or more elements   Clinical Presentation  Moderate - Evolving   Clinical Decision Making  Moderate Complexity     Gait Trainin minutes  Therapeutic Activity:  15 minutes    Deya Webb PT, DPT

## 2025-06-25 NOTE — PROGRESS NOTES
[facility]  part of [facility]    Progress Note    Navin Pardo Patient Status:  Observation    3/25/1970 MRN [id number]   Location [facility] 5SW/SE Attending Brennan Smith MD   Hosp Day # 0 PCP Wilver Saldana MD, MD       Subjective:   Navin Pardo is a(n) 55 year old female with a past medical history significant for hypertension, anxiety and depression admitted with acute pyelonephritis now complaining of generalized aches and pains palpitations and headache.  As per RN heart rate in the 140s for which an RRT was called    Objective:   Blood pressure 110/55, pulse (!) 145, temperature (!) 102.2 °F (39 °C), temperature source Oral, resp. rate 20, height 5' 2.5\" (1.588 m), weight 260 lb 4.8 oz (118.1 kg), last menstrual period 2017, SpO2 94%.    GENERAL:  The patient appeared to be in no distress and was comfortable.  SKIN:  Warm and hydrated  PSYCHIATRIC: Calm and cooperative   HEENT:  Head was atraumatic and normocephalic.  Eyes: Sclera was anicteric.  Pupils were equal.  Ears:  There were no lesions.  Nose:  No lesions were noted.     NECK:  Supple.  There was no JVD.   CHEST:  Symmetrical movement on inspiration  LUNGS:  No audible wheezing  ABDOMEN: Non-distended  MUSCULOSKELETAL:  There was no deformity.  There was full range of motion in all the extremities.   EXTREMITIES: There was no edema, clubbing or cyanosis  NEUROLOGICAL:  There was no focal deficit.      Scheduled Medications[1]        Results:     Lab Results   Component Value Date    WBC 18.3 (H) 2025    HGB 13.1 2025    HCT 40.1 2025    .0 2025    CREATSERUM 0.86 2025    BUN 11 2025     (L) 2025    K 4.1 2025     2025    CO2 24.0 2025     (H) 2025    CA 9.3 2025    ALB 4.3 2025    ALKPHO 104 2025    BILT 1.0 2025    TP 6.7 2025    AST 39 (H) 2025    ALT 60 (H)  06/24/2025    INR 1.12 08/10/2023    TSH 1.550 01/20/2023    LIP 19 06/24/2025    MG 2.1 08/10/2023       XR CHEST AP PORTABLE  (CPT=71045)  Result Date: 6/24/2025  CONCLUSION: No pneumonia. Mild pulmonary congestion. Electronically Verified and Signed by Attending Radiologist: Dallas Katz MD 6/24/2025 6:25 PM Workstation: [id number]    CT ABDOMEN+PELVIS(CONTRAST ONLY)(CPT=74177)  Result Date: 6/24/2025  CONCLUSION: Heterogeneous enhancement of the right greater than left kidneys with probable associated multifocal striations and diffuse urothelial thickening as well as right greater than left perinephric/perirenal inflammatory stranding. Circumferential bladder wall thickening and perivesicular inflammation also noted. Findings are concerning for right greater than left ascending retract infection/pyelonephritis with cystitis. No well-defined/drainable renal abscess. Urinalysis correlation is requested. Punctate focus of nondependent gas in the bladder presumably relates to recent instrumentation. Probable mild fatty liver. Cholecystectomy. Colonic diverticulosis. No evidence of bowel obstruction. Retroflexed uterus. Bilateral hip arthroplasties are noted; resultant streak artifacts limit evaluation of the pelvic structures. Lesser incidental findings as above. Electronically Verified and Signed by Attending Radiologist: Paul Beauchamp MD 6/24/2025 6:11 PM Workstation: [id number]    EKG 12 Lead  Result Date: 6/24/2025  Sinus tachycardia with short MO Possible Inferior infarct (cited on or before 19-JAN-2023) Abnormal ECG When compared with ECG of 24-JUN-2025 13:08, Nonspecific T wave abnormality no longer evident in Anterior-lateral leads    EKG 12 Lead  Result Date: 6/24/2025  Sinus tachycardia Inferior infarct (cited on or before 19-JAN-2023) Cannot rule out Anterior infarct , age undetermined Abnormal ECG When compared with ECG of 29-JUN-2024 22:35, Nonspecific T wave abnormality now evident in  Anterior-lateral leads        Assessment and Plan:     Acute pyelonephritis with associated sepsis  Sepsis protocol initiated, cultures pending at this time, currently on meropenem we will continue the same.  IV fluids initiated.    Headache  [facility]  part of [facility]      Sepsis Reassessment Note    /55 (BP Location: Right arm)   Pulse (!) 145   Temp (!) 102.2 °F (39 °C) (Oral)   Resp 20   Ht 5' 2.5\" (1.588 m)   Wt 260 lb 4.8 oz (118.1 kg)   LMP 04/04/2017   SpO2 94%   BMI 46.85 kg/m²      I completed the sepsis reassessment at 0000    Cardiac:  Regularity: Regular  Rate: Tachycardic  Heart Sounds: S1,S2    Lungs:   Right: Clear  Left: Clear    Peripheral Pulses:  Radial: Right 1+ or Left 1+      Capillary Refill:  <3 Secs    Skin:  Temp/Moisture: Warm and Dry  Color: Normal      ZORAIDA BANERJEE MD  6/25/2025  12:19 AM      35 minutes of critical care time spent with patient including coordinating care with ancillary staff             [1]    ketorolac  30 mg Intravenous Once    sodium chloride  1,600 mL Intravenous Once    enoxaparin  40 mg Subcutaneous Daily    vancomycin  125 mg Oral Daily    meropenem  500 mg Intravenous Q8H    sodium chloride  1,000 mL Intravenous Once

## 2025-06-25 NOTE — CONSULTS
[facility]  part of [facility] ID CONSULT NOTE    Navin Pardo Patient Status:  Observation    3/25/1970 MRN [id number]   Location [facility] 5SW/SE Attending Luca Pulliam MD   Hosp Day # 0 PCP Wilver Saldana MD, MD       Reason for Consultation:  Bacteremia    ASSESSMENT:    Antibiotics: IV cefepime, OVP; (IV meropenem, ceftriaxone)    # Acute E. Coli bacteremia in 2/2 BCx from  from [facility] with CT A/P howing bilatera pyelonephritis  # Morbid obesity      PLAN:    -  discontinue meropenem  -  start IV cefepime  -  f/up cx  -  Follow fever curve, wbc  -  Reviewed labs, micro, imaging reports, available old records  -  d/w patient, staff, Primary    History of Present Illness:  Navin Pardo is a a(n) 55 year old female. Patient is a 55-year-old female with a history of morbid obesity, diabetes, depression who presents to hospital on  with nausea, vomiting, headache, myalgias, subjective fevers and chills, frequency over the past 3 days with progressive worsening.  Here noted to be febrile to 102.9 with WBC 19.6 and UA with pyuria.  Initially started on ceftriaxone and then changed to meropenem.  Blood culture now with E. coli in both sets not ESBL and urine culture pending.  CT A/P with enhancement of the right greater than left kidney and urothelial thickening with perinephric stranding and bladder thickening.    History:  Past Medical History[1]  Past Surgical History[2]  Family History[3]   reports that she has never smoked. She has never used smokeless tobacco. She reports that she does not drink alcohol and does not use drugs.    Allergies:  Allergies[4]    Medications:  Current Hospital Medications[5]    Review of Systems:  Per HPI    Physical Exam:  Vital signs: Blood pressure 105/61, pulse 105, temperature 98.7 °F (37.1 °C), temperature source Oral, resp. rate 16, height 158.8 cm (5' 2.5\"), weight 260 lb 4.8 oz (118.1 kg), last  menstrual period 2017, SpO2 93%.    General: Alert, oriented, NAD  HEENT: Moist mucous membranes. EOMI  Neck: No lymphadenopathy.  Supple.  Cardiovascular: No chest wall tenderness  Respiratory: Symmetric expansion  Abdomen: Soft, obese, non-tender  Back: no CVA ttp  Musculoskeletal: No edema noted  Integument: No lesions. No erythema.    Laboratory Data: Reviewed in EMR    Microbiology: Reviewed in EMR    Radiology: Reviewed    Thank you for allowing us to participate in the care of this patient. Please do not hesitate to call if you have any questions.     We will continue to follow with you and will make further recommendations based on his progress.    MD Vishal Leahy [facility]  [phone]  2025         [1]   Past Medical History:   Anxiety state    Depression    no longer taking meds     Diabetes (HCC)    History of blood transfusion    25 yrs ago;no reactions    Osteoarthritis    Visual impairment    eyeglasses   [2]   Past Surgical History:  Procedure Laterality Date          Cholecystectomy      Total hip replacement Left     Total knee replacement Right 2019    Total knee replacement Left 2020   [3]   Family History  Problem Relation Age of Onset    Arthritis Mother    [4] No Known Allergies  [5]   Current Facility-Administered Medications:     potassium chloride 40 mEq in 250mL sodium chloride 0.9% IVPB premix, 40 mEq, Intravenous, Once **FOLLOWED BY** potassium chloride 20 mEq/100mL IVPB premix 20 mEq, 20 mEq, Intravenous, Once    sodium chloride 0.9% infusion, 150 mL/hr, Intravenous, Continuous    enoxaparin (Lovenox) 40 MG/0.4ML SUBQ injection 40 mg, 40 mg, Subcutaneous, Daily    acetaminophen (Tylenol Extra Strength) tab 500 mg, 500 mg, Oral, Q4H PRN    ondansetron (Zofran) 4 MG/2ML injection 4 mg, 4 mg, Intravenous, Q6H PRN    prochlorperazine (Compazine) 10 MG/2ML injection 5 mg, 5 mg, Intravenous, Q8H PRN    temazepam  (Restoril) cap 15 mg, 15 mg, Oral, Nightly PRN    morphINE PF 2 MG/ML injection 1 mg, 1 mg, Intravenous, Q2H PRN **OR** morphINE PF 2 MG/ML injection 2 mg, 2 mg, Intravenous, Q2H PRN **OR** morphINE PF 4 MG/ML injection 4 mg, 4 mg, Intravenous, Q2H PRN    vancomycin (Vancocin) cap 125 mg, 125 mg, Oral, Daily    meropenem (Merrem) 500 mg in sodium chloride 0.9% 100mL IVPB-CRISTINO, 500 mg, Intravenous, Q8H

## 2025-06-25 NOTE — H&P
[facility]    PATIENT'S NAME: ELISABETH MERCHANT   ATTENDING PHYSICIAN: Simone Bertrand MD   PATIENT ACCOUNT#:   [id number]    LOCATION:  [address]  MEDICAL RECORD #:   [id number]       YOB: 1970  ADMISSION DATE:       2025    HISTORY AND PHYSICAL EXAMINATION    CHIEF COMPLAINT:  Acute pyelonephritis.    HISTORY OF PRESENT ILLNESS:  The patient is a 55-year-old  female presented to the emergency department for evaluation of fevers, body aches, and chills.  CBC showed white blood cell count of 18.3 with left shift.  Chemistry was unremarkable.  Sodium 134.  Urinalysis showed evidence of urinary tract infection.  CT scan of the abdomen and pelvis showed cystitis with ascending urinary tract infection and bilateral pyelonephritis, right more than left.  Started on IV Rocephin.  Patient had sinus tachycardia upon arrival and temperature of 102.9.  Blood and urine cultures were obtained.    PAST MEDICAL HISTORY:  Hypertension; depression; anxiety; generalized osteoarthritis; C difficile infection; small-bowel obstruction, treated conservatively.    PAST SURGICAL HISTORY:  , cholecystectomy, left total hip arthroplasty, bilateral total knee arthroplasties.    MEDICATIONS:  Please see medication reconciliation list.     ALLERGIES:  No known drug allergies.    FAMILY HISTORY:  Positive for hypertension.    SOCIAL HISTORY:  No tobacco, alcohol, or drug use.  Independent for basic activities of daily living.     REVIEW OF SYSTEMS:  No dysuria or urinary frequency but fever, chills, and body aches for last 2 to 3 days with generalized weakness.  Other 12-point review of systems is negative.       PHYSICAL EXAMINATION:    GENERAL:  Alert and oriented to time, place, and person.  Moderate distress.  VITAL SIGNS:  Temperature 102.9; pulse 140, sinus tachycardia; respiratory rate 20; blood pressure 119/66; pulse ox 95% on room air.    HEENT:  Atraumatic.  Oropharynx  clear.  Dry mucous membranes.  Ears, nose normal.  Eyes:  Anicteric sclerae.   NECK:  Supple.  No lymphadenopathy.  Trachea midline.  Full range of motion.  LUNGS:  Clear to auscultation bilaterally.  Normal respiratory effort.    HEART:  Regular rate, rhythm.  S1 and S2 auscultated.  No murmur.  Tachycardic.  ABDOMEN:  Soft, obese.  Positive bowel sounds.  No distension.  No tenderness.    BACK:  Positive costovertebral angle tenderness on the right side.  EXTREMITIES:  Lymphedema.  No pitting edema.   NEUROLOGIC:  Motor and sensory intact.     ASSESSMENT:    1.   Acute urinary tract infection, ascending with bilateral pyelonephritis, right more than left.  2.   Possible early sepsis.  3.   Sinus tachycardia.  4.   Morbid obesity.  5.   History of Clostridium difficile.     PLAN:  Patient will be admitted to general medical floor.  IV Rocephin.  IV fluids.  Monitor hemodynamic status.  Follow up on blood and urine cultures.  Oral vancomycin for C difficile prophylaxis.  Pain control.  Further recommendations to follow.     Dictated By Brennan Smith MD  d: 06/24/2025 18:55:34  t: 06/24/2025 18:57:25  Job [id number]  FB/

## 2025-06-25 NOTE — PLAN OF CARE
Problem: Diabetes/Glucose Control  Goal: Glucose maintained within prescribed range  Description: INTERVENTIONS:  - Monitor Blood Glucose as ordered  - Assess for signs and symptoms of hyperglycemia and hypoglycemia  - Administer ordered medications to maintain glucose within target range  - Assess barriers to adequate nutritional intake and initiate nutrition consult as needed  - Instruct patient on self management of diabetes  Outcome: Progressing     Problem: Patient Centered Care  Goal: Patient preferences are identified and integrated in the patient's plan of care  Description: Interventions:  - What would you like us to know as we care for you? ***  - Provide timely, complete, and accurate information to patient/family  - Incorporate patient and family knowledge, values, beliefs, and cultural backgrounds into the planning and delivery of care  - Encourage patient/family to participate in care and decision-making at the level they choose  - Honor patient and family perspectives and choices  Outcome: Progressing     Problem: RISK FOR INFECTION - ADULT  Goal: Absence of fever/infection during anticipated neutropenic period  Description: INTERVENTIONS  - Monitor WBC  - Administer growth factors as ordered  - Implement neutropenic guidelines  Outcome: Progressing     Problem: GASTROINTESTINAL - ADULT  Goal: Minimal or absence of nausea and vomiting  Description: INTERVENTIONS:  - Maintain adequate hydration with IV or PO as ordered and tolerated  - Nasogastric tube to low intermittent suction as ordered  - Evaluate effectiveness of ordered antiemetic medications  - Provide nonpharmacologic comfort measures as appropriate  - Advance diet as tolerated, if ordered  - Obtain nutritional consult as needed  - Evaluate fluid balance  Outcome: Progressing     Problem: METABOLIC/FLUID AND ELECTROLYTES - ADULT  Goal: Glucose maintained within prescribed range  Description: INTERVENTIONS:  - Monitor Blood Glucose as  ordered  - Assess for signs and symptoms of hyperglycemia and hypoglycemia  - Administer ordered medications to maintain glucose within target range  - Assess barriers to adequate nutritional intake and initiate nutrition consult as needed  - Instruct patient on self management of diabetes  Outcome: Progressing  Goal: Electrolytes maintained within normal limits  Description: INTERVENTIONS:  - Monitor labs and rhythm and assess patient for signs and symptoms of electrolyte imbalances  - Administer electrolyte replacement as ordered  - Monitor response to electrolyte replacements, including rhythm and repeat lab results as appropriate  - Fluid restriction as ordered  - Instruct patient on fluid and nutrition restrictions as appropriate  Outcome: Progressing     Problem: Patient/Family Goals  Goal: Patient/Family Long Term Goal  Description: Patient's Long Term Goal: DC home    Interventions:  - - Monitor vitals  - Monitor appropriate labs  - Administer medications as ordered  - Follow MD's orders  - Update patient on plan of care   - Discharge planning     - See additional Care Plan goals for specific interventions  Outcome: Progressing  Goal: Patient/Family Short Term Goal  Description: Patient's Short Term Goal: feel better    Interventions:   - prn meds  -monitor vitals  -iv antibiotics  - See additional Care Plan goals for specific interventions  Outcome: Progressing

## 2025-06-25 NOTE — ED QUICK NOTES
Transport at bedside. Patient aware of plan of care, verbalizes understanding.   Purewick removed prior to transport   Brought to floor with belongings.

## 2025-06-26 LAB
GLUCOSE BLDC GLUCOMTR-MCNC: 111 MG/DL (ref 70–99)
GLUCOSE BLDC GLUCOMTR-MCNC: 142 MG/DL (ref 70–99)
GLUCOSE BLDC GLUCOMTR-MCNC: 87 MG/DL (ref 70–99)
GLUCOSE BLDC GLUCOMTR-MCNC: 92 MG/DL (ref 70–99)

## 2025-06-26 PROCEDURE — 99233 SBSQ HOSP IP/OBS HIGH 50: CPT | Performed by: INTERNAL MEDICINE

## 2025-06-26 RX ORDER — POTASSIUM CHLORIDE 1500 MG/1
40 TABLET, EXTENDED RELEASE ORAL ONCE
Status: COMPLETED | OUTPATIENT
Start: 2025-06-26 | End: 2025-06-26

## 2025-06-26 RX ORDER — METOCLOPRAMIDE HYDROCHLORIDE 5 MG/ML
10 INJECTION INTRAMUSCULAR; INTRAVENOUS ONCE
Status: COMPLETED | OUTPATIENT
Start: 2025-06-26 | End: 2025-06-26

## 2025-06-26 NOTE — PAYOR COMM NOTE
--------------  ADMISSION REVIEW     Payor: [employer]  Subscriber #:  [id number]  Authorization Number: [id number]    Admit date: 6/24/25  Admit time:  8:30 PM       REVIEW DOCUMENTATION:     ED Provider Notes            Stated Complaint: vomiting    Subjective:   HPI            55-year-old female with history of anxiety, depression, and severe obesity presents with complaints of nausea, vomiting, headaches, body aches, subjective fever, and chills.  The patient reports symptoms over the past 3 days.  Describes a diffuse headache worsened today.  She also reports heartburn with a burning pain to her upper chest and throat.  She denies cough or dyspnea.  No diarrhea.  She denies dysuria or hematuria.  She began Ozempic 3 weeks ago but was feeling well until present symptoms beginning 2 days ago.  No known sick contacts.  History is obtained through the patient's son at the bedside as the patient does not speak English.  She declines  services.      ED Triage Vitals [06/24/25 1300]   /66   Pulse (!) 140   Resp 20   Temp 99.5 °F (37.5 °C)   Temp src Oral   SpO2 94 %   O2 Device None (Room air)       Current Vitals:   Vital Signs  BP: 119/66  Pulse: 113  Resp: 20  Temp: 99.5 °F (37.5 °C)  Temp src: Oral    Oxygen Therapy  SpO2: 95 %  O2 Device: None (Room air)      Physical Exam    General Appearance: alert, no distress  Eyes: pupils equal and round no pallor or injection  ENT, Mouth: mucous membranes moist  Respiratory: there are no retractions, lungs are clear to auscultation  Cardiovascular: Tachycardic, regular rhythm  Gastrointestinal:  abdomen is soft with diffuse tenderness to palpation, no masses, bowel sounds normal  Neurological: Speech normal.  Moving extremities x 4.  Skin: warm and dry, no rashes.  Musculoskeletal: neck is supple non tender        Extremities are symmetrical, full range of motion  Psychiatric: patient is oriented X 3, there is no agitation    DIFFERENTIAL  DIAGNOSIS: After history and physical exam differential diagnosis was considered for viral illness, intra-abdominal infection such as appendicitis or diverticulitis, pulmonary infectious process, urinary tract infection, or other      ED Course  Labs Reviewed   CBC WITH DIFFERENTIAL WITH PLATELET - Abnormal; Notable for the following components:       Result Value    WBC 18.3 (*)     RDW-SD 47.8 (*)     Neutrophil Absolute Prelim 15.75 (*)     Neutrophil Absolute 15.75 (*)     Lymphocyte Absolute 0.82 (*)     Monocyte Absolute 1.48 (*)     All other components within normal limits   COMP METABOLIC PANEL (14) - Abnormal; Notable for the following components:    Glucose 142 (*)     Sodium 134 (*)     ALT 60 (*)     AST 39 (*)     All other components within normal limits   URINALYSIS WITH CULTURE REFLEX - Abnormal; Notable for the following components:    Ketones Urine 80 (*)     Blood Urine Moderate (*)     Protein Urine 100 mg/dL (*)     Urobilinogen Urine 2.0 (*)     Nitrite Urine Positive (*)     Leukocyte Esterase Urine Small (*)     All other components within normal limits   UA MICROSCOPIC ONLY, URINE - Abnormal; Notable for the following components:    WBC Urine 21-50 (*)     RBC Urine >10 (*)     Bacteria Urine 3+ (*)     Squamous Epi. Cells Few (*)     All other components within normal limits   LIPASE - Normal   TROPONIN I HIGH SENSITIVITY - Normal   LACTIC ACID, PLASMA - Normal     EKG    Rate, intervals and axes as noted on EKG Report.  Rate: 136  Rhythm: Sinus Rhythm  Axis: Normal  Reading: Nonspecific EKG    MDM     Lab and CT results noted.  Patient with acute pyelonephritis.  Continues with tachycardia with heart rate of 120.  No longer vomiting.  Will admit for IV fluids and IV antibiotics.  Discussed with Dr. Smith, hospitalist.    Admission disposition: 6/24/2025  6:40 PM      Medical Decision Making      Disposition and Plan     Clinical Impression:  1. Acute pyelonephritis          Disposition:  Admit  6/24/2025  6:40 pm     Hospital Problems       Present on Admission  Date Reviewed: 6/3/2021          ICD-10-CM Noted POA    * (Principal) Acute pyelonephritis N10 6/24/2025 Unknown              [facility]     PATIENT'S NAME: ELISABETH MERCHANT   ATTENDING PHYSICIAN: Simone Bertrand MD   PATIENT ACCOUNT#:   [id number]    LOCATION:  [address]  MEDICAL RECORD #:   [id number]       YOB: 1970  ADMISSION DATE:       06/24/2025     HISTORY AND PHYSICAL EXAMINATION     CHIEF COMPLAINT:  Acute pyelonephritis.     HISTORY OF PRESENT ILLNESS:  The patient is a 55-year-old  female presented to the emergency department for evaluation of fevers, body aches, and chills.  CBC showed white blood cell count of 18.3 with left shift.  Chemistry was unremarkable.  Sodium 134.  Urinalysis showed evidence of urinary tract infection.  CT scan of the abdomen and pelvis showed cystitis with ascending urinary tract infection and bilateral pyelonephritis, right more than left.  Started on IV Rocephin.  Patient had sinus tachycardia upon arrival and temperature of 102.9.  Blood and urine cultures were obtained.      PHYSICAL EXAMINATION:    GENERAL:  Alert and oriented to time, place, and person.  Moderate distress.  VITAL SIGNS:  Temperature 102.9; pulse 140, sinus tachycardia; respiratory rate 20; blood pressure 119/66; pulse ox 95% on room air.    HEENT:  Atraumatic.  Oropharynx clear.  Dry mucous membranes.  Ears, nose normal.  Eyes:  Anicteric sclerae.   NECK:  Supple.  No lymphadenopathy.  Trachea midline.  Full range of motion.  LUNGS:  Clear to auscultation bilaterally.  Normal respiratory effort.    HEART:  Regular rate, rhythm.  S1 and S2 auscultated.  No murmur.  Tachycardic.  ABDOMEN:  Soft, obese.  Positive bowel sounds.  No distension.  No tenderness.    BACK:  Positive costovertebral angle tenderness on the right side.  EXTREMITIES:  Lymphedema.  No pitting  edema.   NEUROLOGIC:  Motor and sensory intact.      ASSESSMENT:    1.       Acute urinary tract infection, ascending with bilateral pyelonephritis, right more than left.  2.       Possible early sepsis.  3.       Sinus tachycardia.  4.       Morbid obesity.  5.       History of Clostridium difficile.      PLAN:  Patient will be admitted to general medical floor.  IV Rocephin.  IV fluids.  Monitor hemodynamic status.  Follow up on blood and urine cultures.  Oral vancomycin for C difficile prophylaxis.  Pain control.  Further recommendations to follow.      Dictated By Brennan Smith MD    6/25 ID:    Reason for Consultation:  Bacteremia     ASSESSMENT:     Antibiotics: IV cefepime, OVP; (IV meropenem, ceftriaxone)     # Acute E. Coli bacteremia in 2/2 BCx from 6/24 from [facility] with CT A/P howing bilatera pyelonephritis  # Morbid obesity        PLAN:     -  discontinue meropenem  -  start IV cefepime  -  f/up cx  -  Follow fever curve, wbc  -  Reviewed labs, micro, imaging reports, available old records  -  d/w patient, staff, Primary     History of Present Illness:  Navin Pardo is a a(n) 55 year old female. Patient is a 55-year-old female with a history of morbid obesity, diabetes, depression who presents to hospital on 6/24 with nausea, vomiting, headache, myalgias, subjective fevers and chills, frequency over the past 3 days with progressive worsening.  Here noted to be febrile to 102.9 with WBC 19.6 and UA with pyuria.  Initially started on ceftriaxone and then changed to meropenem.  Blood culture now with E. coli in both sets not ESBL and urine culture pending.  CT A/P with enhancement of the right greater than left kidney and urothelial thickening with perinephric stranding and bladder thickening.       Physical Exam:  Vital signs: Blood pressure 105/61, pulse 105, temperature 98.7 °F (37.1 °C), temperature source Oral, resp. rate 16, height 158.8 cm (5' 2.5\"), weight 260 lb 4.8 oz (118.1 kg),  last menstrual period 04/04/2017, SpO2 93%.     General: Alert, oriented, NAD  HEENT: Moist mucous membranes. EOMI  Neck: No lymphadenopathy.  Supple.  Cardiovascular: No chest wall tenderness  Respiratory: Symmetric expansion  Abdomen: Soft, obese, non-tender  Back: no CVA ttp  Musculoskeletal: No edema noted  Integument: No lesions. No erythema.     Laboratory Data: Reviewed in EMR     Microbiology: Reviewed in EMR     Radiology: Reviewed     Thank you for allowing us to participate in the care of this patient. Please do not hesitate to call if you have any questions.      We will continue to follow with you and will make further recommendations based on his progress.     Kosta Day MD       6/25 HOSPITALIST:    Subjective:   Navin Pardo is a(n) 55 year old female with a past medical history significant for hypertension, anxiety and depression admitted with acute pyelonephritis now complaining of generalized aches and pains palpitations and headache.  As per RN heart rate in the 140s for which an RRT was called     Objective:   Blood pressure 110/55, pulse (!) 145, temperature (!) 102.2 °F (39 °C), temperature source Oral, resp. rate 20, height 5' 2.5\" (1.588 m), weight 260 lb 4.8 oz (118.1 kg), last menstrual period 04/04/2017, SpO2 94%.     GENERAL:  The patient appeared to be in no distress and was comfortable.  SKIN:  Warm and hydrated  PSYCHIATRIC: Calm and cooperative   HEENT:  Head was atraumatic and normocephalic.  Eyes: Sclera was anicteric.  Pupils were equal.  Ears:  There were no lesions.  Nose:  No lesions were noted.     NECK:  Supple.  There was no JVD.   CHEST:  Symmetrical movement on inspiration  LUNGS:  No audible wheezing  ABDOMEN: Non-distended  MUSCULOSKELETAL:  There was no deformity.  There was full range of motion in all the extremities.   EXTREMITIES: There was no edema, clubbing or cyanosis  NEUROLOGICAL:  There was no focal deficit.              Assessment and Plan:      Acute pyelonephritis with associated sepsis  Sepsis protocol initiated, cultures pending at this time, currently on meropenem we will continue the same.  IV fluids initiated.     Headache  [facility]        Sepsis Reassessment Note     /55 (BP Location: Right arm)   Pulse (!) 145   Temp (!) 102.2 °F (39 °C) (Oral)   Resp 20   Ht 5' 2.5\" (1.588 m)   Wt 260 lb 4.8 oz (118.1 kg)   LMP 04/04/2017   SpO2 94%   BMI 46.85 kg/m²                   I completed the sepsis reassessment at 0000     Cardiac:  Regularity: Regular  Rate: Tachycardic  Heart Sounds: S1,S2     Lungs:   Right: Clear  Left: Clear     Peripheral Pulses:  Radial: Right 1+ or Left 1+        Capillary Refill:  <3 Secs     Skin:  Temp/Moisture: Warm and Dry  Color: Normal        ZORAIDA BANERJEE MD  6/25/2025  12:19 AM          MEDICATIONS ADMINISTERED IN LAST 1 DAY:  acetaminophen (Tylenol Extra Strength) tab 500 mg       Date Action Dose Route User    6/26/2025 0602 Given 500 mg Oral Melanie Boyer RN    6/25/2025 1533 Given 500 mg Oral Sara Gonzalez RN          calcium gluconate 1g in 100mL iso-NaCl IVPB premix       Date Action Dose Route User    6/25/2025 1749 New Bag 1 g Intravenous Sara Gnozalez RN          ceFEPIme (Maxipime) 1 g in sodium chloride 0.9% 100mL IVPB-CRISTINO       Date Action Dose Route User    6/26/2025 0005 New Bag 1 g Intravenous Melanie Boyer RN    6/25/2025 1533 New Bag 1 g Intravenous Sara Gonzalez RN          enoxaparin (Lovenox) 40 MG/0.4ML SUBQ injection 40 mg       Date Action Dose Route User    6/25/2025 0825 Given 40 mg Subcutaneous (Left Lower Abdomen) Sara Gonzalez RN          ibuprofen (Motrin) tab 400 mg       Date Action Dose Route User    6/25/2025 1711 Given 400 mg Oral Jennie Wilburn RN          meropenem (Merrem) 500 mg in sodium chloride 0.9% 100mL IVPB-CRISTINO       Date Action Dose  Route User    6/25/2025 1040 New Bag 500 mg Intravenous Elisa Bassett RN          ondansetron (Zofran) 4 MG/2ML injection 4 mg       Date Action Dose Route User    6/26/2025 0602 Given 4 mg Intravenous Melanie Boyer RN    6/26/2025 0004 Given 4 mg Intravenous Melanie Boyer RN    6/25/2025 1533 Given 4 mg Intravenous Sara Gonzalez RN    6/25/2025 0829 Given 4 mg Intravenous Sara Gonzalez RN          potassium chloride 20 mEq/100mL IVPB premix 20 mEq       Date Action Dose Route User    6/25/2025 1251 New Bag 20 mEq Intravenous Sara Gonzalez RN          potassium chloride 40 mEq in 250mL sodium chloride 0.9% IVPB premix       Date Action Dose Route User    6/25/2025 0825 New Bag 40 mEq Intravenous Sara Gonzalez RN          prochlorperazine (Compazine) 10 MG/2ML injection 5 mg       Date Action Dose Route User    6/26/2025 0230 Given 5 mg Intravenous Melanie Boyer RN          sodium chloride 0.9% infusion       Date Action Dose Route User    6/26/2025 0602 New Bag 150 mL/hr Intravenous Melanie Boyer RN    6/26/2025 0009 New Bag 150 mL/hr Intravenous Melanie Boyer RN          vancomycin (Vancocin) cap 125 mg       Date Action Dose Route User    6/25/2025 0825 Given 125 mg Oral Sara Gonzalez RN            Vitals (last day)       Date/Time Temp Pulse Resp BP SpO2 Weight O2 Device O2 Flow Rate (L/min) [facility]    06/26/25 0657 -- 107 -- -- -- -- -- -- IB    06/26/25 0653 -- 131 -- -- -- -- -- -- MO    06/26/25 0559 100.1 °F (37.8 °C) 105 18 115/80 97 % -- None (Room air) -- IB    06/26/25 0300 -- 101 -- -- -- -- -- -- MO    06/26/25 0225 100 °F (37.8 °C) 108 16 99/59 -- -- None (Room air) -- IB    06/25/25 2044 98.4 °F (36.9 °C) 93 16 94/59 93 % -- None (Room air) -- IB    06/25/25 1715 99.6 °F (37.6 °C) -- -- -- -- -- -- -- AP    06/25/25 1645 100.7 °F (38.2 °C) -- -- -- -- -- -- -- AP    06/25/25 1532 100.5 °F (38.1 °C) -- 16 119/56 95 %  -- None (Room air) -- AP    06/25/25 1044 -- -- -- 139/91 -- -- -- -- GG    06/25/25 1040 -- -- -- 139/91 -- -- -- -- GS    06/25/25 0824 98.7 °F (37.1 °C) 105 16 105/61 93 % -- None (Room air) -- AP    06/25/25 0449 -- 115 -- -- -- -- -- -- KD    06/25/25 0414 98.5 °F (36.9 °C) 115 18 93/53 94 % -- None (Room air) -- NM    06/25/25 0233 -- 117 -- -- -- -- -- -- NM    06/25/25 0100 99 °F (37.2 °C) 126 -- -- -- -- -- -- NM    06/25/25 00:28:42 102.3 °F (39.1 °C) 140 20 102/91 93 % -- None (Room air) -- EDWINA

## 2025-06-26 NOTE — CM/SW NOTE
06/26/25 1400   CM/ Referral Data   Referral Source    Reason for Referral Discharge planning   Informant Patient   Medical Hx   Does patient have an established PCP? Yes  (Nav Murphy MD)   Patient Info   Patient's Current Mental Status at Time of Assessment Alert;Oriented   Patient Communication Issues Language barrier  ( used)   Patient's Home Environment Condo/Apt no elevator   Number of Levels in Home 1   Number of Stair in Home 19   Patient lives with Spouse/Significant other;Parent(s)   Patient Status Prior to Admission   Independent with ADLs and Mobility Yes   Discharge Needs   Anticipated D/C needs No anticipated discharge needs     CM met with patient at bedside to discuss discharge planning. CM confirmed patient's home address. CM changed patient's PCP in [facility] to Nav Murhpy MD. Patient recently established care with provider and is no longer seeing Dr. Saldana.    Patient lives in a 2nd floor apartment with  and mother. Patient states she has about 19 stairs to access the floor her apartment is on, but does not currently require any assistive devices to help with stairs, ambulation or other activities of daily living.     Patient stated some concern about being slightly weaker than baseline, but does not believe she will need home health care upon discharge.     Plan: No anticipated needs upon discharge at this time.    / to remain available for support and/or discharge planning.     VICTORIANO GaytanN, RN  Nurse   [phone]

## 2025-06-26 NOTE — PAYOR COMM NOTE
--------------  6/26:  CONTINUED STAY REVIEW    Payor: [employer]  Subscriber #:  [id number]  Authorization Number: [id number]    Admit date: 6/24/25  Admit time:  8:30 PM      HOSPITALIST:    Subjective:  Patient seen and examined at the bedside.  Laying in bed in no apparent discomfort.  She complained of nausea this morning and states that she had about 4 episodes of vomiting overnight.  Denies any abdominal pain, chest pain, shortness of breath or any other complaints.  Discussed with patient's son Leon over the phone.        Objective:  Review of Systems:   ROS completed; pertinent positive and negatives stated in subjective.        Vital signs:  Temp:  [98.4 °F (36.9 °C)-100.7 °F (38.2 °C)] 100.1 °F (37.8 °C)  Pulse:  [] 107  Resp:  [16-18] 18  BP: ()/(56-91) 115/80  SpO2:  [93 %-97 %] 97 %        Physical Exam:    Gen: NAD AO x3  Chest: good air entry CTABL  CVS: normal s1 and s2 RR  Abd: NABS soft NT ND  Neuro: CN 2-12 grossly intact  Ext: no edema in bilateral LE        Diagnostic Data:    Labs:       Recent Labs   Lab 06/24/25  1444 06/25/25  0558   WBC 18.3* 19.6*   HGB 13.1 10.4*   MCV 88.5 88.8   .0 194.0   BAND  --  27               Recent Labs   Lab 06/24/25  1444 06/25/25  0558 06/25/25  1801   * 124*  --    BUN 11 11  --    CREATSERUM 0.86 0.74  --    CA 9.3 6.9*  --    ALB 4.3  --   --    * 141  --    K 4.1 2.8* 3.1*    110  --    CO2 24.0 20.0*  --    ALKPHO 104  --   --    AST 39*  --   --    ALT 60*  --   --    BILT 1.0  --   --    TP 6.7  --   --               Assessment & Plan:  Sepsis secondary to E. coli UTI , Polynephritis and bacteremia  Patient presented to the emergency department with complaints of fever, body ache and chills.  On presentation she was found to be febrile, tachycardic, normotensive and was saturating well on room air  On admission, she was found to have UA suggestive of infection, CT of the abdomen showed evidence of  bilateral pyelonephritis, no evidence of calculus or hydronephrosis  Patient admitted to the hospital, started on IV antibiotics, IV fluids and infectious disease has been consulted  Urine culture growing E. coli, blood culture also growing E. coli     Plan  Continue IV cefepime as per infectious disease recommendations  Continue IV fluids  Follow repeat blood cultures     Nausea and vomiting  Most likely secondary to infection and antibiotics  Started on IV PPI  As needed nausea medications             Estimated date of discharge: TBD  Discharge is dependent on: clinical stability  At this point Ms. Pardo is expected to be discharge to: home              **Certification        PHYSICIAN Certification of Need for Inpatient Hospitalization - Initial Certification     Patient will require inpatient services that will reasonably be expected to span two midnight's based on the clinical documentation in H+P.   Based on patients current state of illness, I anticipate that, after discharge, patient will require TBD.        Luca Pulliam MD  Hospitalist      MEDICATIONS ADMINISTERED IN LAST 1 DAY:  acetaminophen (Tylenol Extra Strength) tab 500 mg       Date Action Dose Route User    6/26/2025 0602 Given 500 mg Oral Melanie Boyer RN    6/25/2025 1533 Given 500 mg Oral Sara Gonzalez RN          calcium gluconate 1g in 100mL iso-NaCl IVPB premix       Date Action Dose Route User    6/25/2025 1749 New Bag 1 g Intravenous Sara Gonzalez RN          ceFEPIme (Maxipime) 1 g in sodium chloride 0.9% 100mL IVPB-CRISTINO       Date Action Dose Route User    6/26/2025 0859 New Bag 1 g Intravenous Gina Lemons RN    6/26/2025 0005 New Bag 1 g Intravenous Melanie Boyer RN    6/25/2025 1533 New Bag 1 g Intravenous Sara Gonzalez RN          enoxaparin (Lovenox) 40 MG/0.4ML SUBQ injection 40 mg       Date Action Dose Route User    6/26/2025 0859 Given 40 mg Subcutaneous (Left Lower Abdomen)  Gina Lemons RN          mylaura-dicyclomine-lidocaine 2% (G.I. Cocktail) oral liquid       Date Action Dose Route User    6/26/2025 0919 Given (none) Oral Gina Lemons RN          ibuprofen (Motrin) tab 400 mg       Date Action Dose Route User    6/25/2025 1711 Given 400 mg Oral Jennie Wilburn RN          metoclopramide (Reglan) 5 mg/mL injection 10 mg       Date Action Dose Route User    6/26/2025 0859 Given 10 mg Intravenous Gina Lemons RN          morphINE PF 4 MG/ML injection 4 mg       Date Action Dose Route User    6/26/2025 0918 Given 4 mg Intravenous Gina Lemons RN          ondansetron (Zofran) 4 MG/2ML injection 4 mg       Date Action Dose Route User    6/26/2025 0602 Given 4 mg Intravenous Melanie Boyer RN    6/26/2025 0004 Given 4 mg Intravenous Melanie Boyer RN    6/25/2025 1533 Given 4 mg Intravenous Sara Gonzalez RN          pantoprazole (Protonix) 40 mg in sodium chloride 0.9% PF 10 mL IV push       Date Action Dose Route User    6/26/2025 0858 Given 40 mg Intravenous Gina Lemons RN          potassium chloride 20 mEq/100mL IVPB premix 20 mEq       Date Action Dose Route User    6/25/2025 1251 New Bag 20 mEq Intravenous Sara Gonzalez RN          potassium chloride (Klor-Con M20) tab 40 mEq       Date Action Dose Route User    6/26/2025 0859 Given 40 mEq Oral Gina Lemons RN          prochlorperazine (Compazine) 10 MG/2ML injection 5 mg       Date Action Dose Route User    6/26/2025 0230 Given 5 mg Intravenous Melanie Boyer RN          sodium chloride 0.9% infusion       Date Action Dose Route User    6/26/2025 0602 New Bag 150 mL/hr Intravenous Melanie Boyer RN    6/26/2025 0009 New Bag 150 mL/hr Intravenous Melanie Boyer RN          vancomycin (Vancocin) cap 125 mg       Date Action Dose Route User    6/26/2025 0859 Given 125 mg Oral Gina Lemons RN            Vitals (last day)       Date/Time Temp Pulse Resp BP SpO2 Weight O2 Device O2  Flow Rate (L/min) [facility]    06/26/25 0849 99.7 °F (37.6 °C) 99 18 111/56 96 % -- None (Room air) -- YD    06/26/25 0657 -- 107 -- -- -- -- -- -- IB    06/26/25 0653 -- 131 -- -- -- -- -- -- MO    06/26/25 0559 100.1 °F (37.8 °C) 105 18 115/80 97 % -- None (Room air) -- IB    06/26/25 0300 -- 101 -- -- -- -- -- -- MO    06/26/25 0225 100 °F (37.8 °C) 108 16 99/59 -- -- None (Room air) -- IB    06/25/25 2044 98.4 °F (36.9 °C) 93 16 94/59 93 % -- None (Room air) -- IB    06/25/25 1715 99.6 °F (37.6 °C) -- -- -- -- -- -- -- AP    06/25/25 1645 100.7 °F (38.2 °C) -- -- -- -- -- -- -- AP    06/25/25 1532 100.5 °F (38.1 °C) -- 16 119/56 95 % -- None (Room air) -- AP    06/25/25 1044 -- -- -- 139/91 -- -- -- -- GG    06/25/25 1040 -- -- -- 139/91 -- -- -- -- GS    06/25/25 0824 98.7 °F (37.1 °C) 105 16 105/61 93 % -- None (Room air) -- AP    06/25/25 0449 -- 115 -- -- -- -- -- -- KD    06/25/25 0414 98.5 °F (36.9 °C) 115 18 93/53 94 % -- None (Room air) -- NM    06/25/25 0233 -- 117 -- -- -- -- -- -- NM    06/25/25 0100 99 °F (37.2 °C) 126 -- -- -- -- -- -- NM    06/25/25 00:28:42 102.3 °F (39.1 °C) 140 20 102/91 93 % -- None (Room air) -- LS

## 2025-06-26 NOTE — PLAN OF CARE
Problem: Diabetes/Glucose Control  Goal: Glucose maintained within prescribed range  Description: INTERVENTIONS:  - Monitor Blood Glucose as ordered  - Assess for signs and symptoms of hyperglycemia and hypoglycemia  - Administer ordered medications to maintain glucose within target range  - Assess barriers to adequate nutritional intake and initiate nutrition consult as needed  - Instruct patient on self management of diabetes  Outcome: Progressing     Problem: Patient Centered Care  Goal: Patient preferences are identified and integrated in the patient's plan of care  Description: Interventions:  - What would you like us to know as we care for you? I speak [place]  - Provide timely, complete, and accurate information to patient/family  - Incorporate patient and family knowledge, values, beliefs, and cultural backgrounds into the planning and delivery of care  - Encourage patient/family to participate in care and decision-making at the level they choose  - Honor patient and family perspectives and choices  Outcome: Progressing     Problem: RISK FOR INFECTION - ADULT  Goal: Absence of fever/infection during anticipated neutropenic period  Description: INTERVENTIONS  - Monitor WBC  - Administer growth factors as ordered  - Implement neutropenic guidelines  Outcome: Progressing     Problem: GASTROINTESTINAL - ADULT  Goal: Minimal or absence of nausea and vomiting  Description: INTERVENTIONS:  - Maintain adequate hydration with IV or PO as ordered and tolerated  - Nasogastric tube to low intermittent suction as ordered  - Evaluate effectiveness of ordered antiemetic medications  - Provide nonpharmacologic comfort measures as appropriate  - Advance diet as tolerated, if ordered  - Obtain nutritional consult as needed  - Evaluate fluid balance  Outcome: Progressing     Problem: METABOLIC/FLUID AND ELECTROLYTES - ADULT  Goal: Glucose maintained within prescribed range  Description: INTERVENTIONS:  - Monitor Blood  Glucose as ordered  - Assess for signs and symptoms of hyperglycemia and hypoglycemia  - Administer ordered medications to maintain glucose within target range  - Assess barriers to adequate nutritional intake and initiate nutrition consult as needed  - Instruct patient on self management of diabetes  Outcome: Progressing  Goal: Electrolytes maintained within normal limits  Description: INTERVENTIONS:  - Monitor labs and rhythm and assess patient for signs and symptoms of electrolyte imbalances  - Administer electrolyte replacement as ordered  - Monitor response to electrolyte replacements, including rhythm and repeat lab results as appropriate  - Fluid restriction as ordered  - Instruct patient on fluid and nutrition restrictions as appropriate  Outcome: Progressing     Problem: Patient/Family Goals  Goal: Patient/Family Long Term Goal  Description: Patient's Long Term Goal: DC home    Interventions:  - - Monitor vitals  - Monitor appropriate labs  - Administer medications as ordered  - Follow MD's orders  - Update patient on plan of care   - Discharge planning     - See additional Care Plan goals for specific interventions  Outcome: Progressing  Goal: Patient/Family Short Term Goal  Description: Patient's Short Term Goal: feel better    Interventions:   - prn meds  -monitor vitals  -iv antibiotics  - See additional Care Plan goals for specific interventions  Outcome: Progressing

## 2025-06-26 NOTE — PROGRESS NOTES
INFECTIOUS DISEASE PROGRESS NOTE  [facility]  part of [facility] ID PROGRESS NOTE    Navin Pardo Patient Status:  Inpatient    3/25/1970 MRN [id number]   Location [facility] 5SW/SE Attending Luca Pulliam MD   Hosp Day # 2 PCP Wilver Saldana MD, MD     Subjective:  ROS reviewed. Tmax 100.1. Still with nausea. Got reglan this AM.    ASSESSMENT:    Antibiotics: IV cefepime, OVP; (IV meropenem, ceftriaxone)     # Acute E. Coli bacteremia in 2/2 BCx from  from [facility] with CT A/P howing bilatera pyelonephritis  # Nausea/vomiting  # Morbid obesity     PLAN:  -  Continue on cefepime and OVP.  -  Follow fever curve, wbc.  -  Reviewed labs, micro, imaging reports, available old records.  -  d/w patient, son on phone, RN.     History of Present Illness:  55 year old with history of morbid obesity, diabetes, depression who presents to hospital on  with nausea, vomiting, headache, myalgias, subjective fevers and chills, frequency over the past 3 days with progressive worsening.  Here noted to be febrile to 102.9 with WBC 19.6 and UA with pyuria.  Initially started on ceftriaxone and then changed to meropenem.  Blood culture now with E. coli in both sets not ESBL and urine culture pending.  CT A/P with enhancement of the right greater than left kidney and urothelial thickening with perinephric stranding and bladder thickening.    Physical Exam:  /56 (BP Location: Right arm)   Pulse 99   Temp 99.7 °F (37.6 °C) (Oral)   Resp 18   Ht 5' 2.5\" (1.588 m)   Wt 260 lb 4.8 oz (118.1 kg)   LMP 2017   SpO2 96%   BMI 46.85 kg/m²     Gen:   Awake, Chinese speaking  HEENT:  EOMI, neck supple  CV/lungs:  RRR, CTAB  Abdom:  Soft, obese  Skin/extrem:  No rashes, no c/c/e  Lines:  PIV+    Laboratory Data: Reviewed    Microbiology: Reviewed    Radiology: Reviewed      [facility]  [phone]  2025

## 2025-06-26 NOTE — PROGRESS NOTES
[facility]  part of [facility]     Hospitalist Progress Note     Navin Pardo Patient Status:  Inpatient    3/25/1970 MRN [id number]   Location [facility] 5SW/SE Attending Luca Pulliam MD   Hosp Day # 2 PCP Wilver Saldana MD, MD     Subjective:     Patient seen and examined at the bedside.  Laying in bed in no apparent discomfort.  She complained of nausea this morning and states that she had about 4 episodes of vomiting overnight.  Denies any abdominal pain, chest pain, shortness of breath or any other complaints.  Discussed with patient's son Leon over the phone.      Objective:    Review of Systems:   ROS completed; pertinent positive and negatives stated in subjective.      Vital signs:  Temp:  [98.4 °F (36.9 °C)-100.7 °F (38.2 °C)] 100.1 °F (37.8 °C)  Pulse:  [] 107  Resp:  [16-18] 18  BP: ()/(56-91) 115/80  SpO2:  [93 %-97 %] 97 %      Physical Exam:    Gen: NAD AO x3  Chest: good air entry CTABL  CVS: normal s1 and s2 RR  Abd: NABS soft NT ND  Neuro: CN 2-12 grossly intact  Ext: no edema in bilateral LE      Diagnostic Data:    Labs:  Recent Labs   Lab 25  1444 25  0558   WBC 18.3* 19.6*   HGB 13.1 10.4*   MCV 88.5 88.8   .0 194.0   BAND  --  27       Recent Labs   Lab 25  1444 25  0558 25  1801   * 124*  --    BUN 11 11  --    CREATSERUM 0.86 0.74  --    CA 9.3 6.9*  --    ALB 4.3  --   --    * 141  --    K 4.1 2.8* 3.1*    110  --    CO2 24.0 20.0*  --    ALKPHO 104  --   --    AST 39*  --   --    ALT 60*  --   --    BILT 1.0  --   --    TP 6.7  --   --        Estimated Creatinine Clearance: 68.7 mL/min (based on SCr of 0.74 mg/dL).    No results for input(s): \"PTP\", \"INR\" in the last 168 hours.           Imaging: Imaging data reviewed in Epic.    Medications: Scheduled Medications[1]    Assessment & Plan:     Sepsis secondary to E. coli UTI , Polynephritis and bacteremia  Patient presented to  the emergency department with complaints of fever, body ache and chills.  On presentation she was found to be febrile, tachycardic, normotensive and was saturating well on room air  On admission, she was found to have UA suggestive of infection, CT of the abdomen showed evidence of bilateral pyelonephritis, no evidence of calculus or hydronephrosis  Patient admitted to the hospital, started on IV antibiotics, IV fluids and infectious disease has been consulted  Urine culture growing E. coli, blood culture also growing E. coli    Plan  Continue IV cefepime as per infectious disease recommendations  Continue IV fluids  Follow repeat blood cultures    Nausea and vomiting  Most likely secondary to infection and antibiotics  Started on IV PPI  As needed nausea medications        Plan of care discussed with patient or family at bedside.      Supplementary Documentation:     Quality:  DVT Prophylaxis: Lovenox  CODE status: Full  Roberto: No  Central line: No      Estimated date of discharge: TBD  Discharge is dependent on: clinical stability  At this point Ms. Pardo is expected to be discharge to: home             **Certification      PHYSICIAN Certification of Need for Inpatient Hospitalization - Initial Certification    Patient will require inpatient services that will reasonably be expected to span two midnight's based on the clinical documentation in H+P.   Based on patients current state of illness, I anticipate that, after discharge, patient will require TBD.      Luca Pulliam MD  Hospitalist    MDM: High, I personally reviewed the available laboratories, imaging including CBC CMP. I discussed the case with patient, RN, son. I ordered laboratories, studies including CBC, CMP. I adjusted medications   Medical decision making high, risk is high.       The 21st Century Cures Act makes medical notes like these available to patients in the interest of transparency. Please be advised this is a medical document.  Medical documents are intended to carry relevant information, facts as evident, and the clinical opinion of the practitioner. The medical note is intended as peer to peer communication and may appear blunt or direct. It is written in medical language and may contain abbreviations or verbiage that are unfamiliar.        [1]    potassium chloride  40 mEq Oral Once    pantoprazole  40 mg Intravenous Q12H    metoclopramide  10 mg Intravenous Once    mylanta-dicyclomine-lidocaine 2% (G.I. Cocktail) oral liquid   Oral Once    cefepime  1 g Intravenous Q8H    enoxaparin  40 mg Subcutaneous Daily    vancomycin  125 mg Oral Daily

## 2025-06-27 ENCOUNTER — APPOINTMENT (OUTPATIENT)
Dept: CT IMAGING | Facility: HOSPITAL | Age: 55
End: 2025-06-27
Attending: INTERNAL MEDICINE
Payer: MEDICARE

## 2025-06-27 LAB
ALBUMIN SERPL-MCNC: 3.4 G/DL (ref 3.2–4.8)
ALBUMIN/GLOB SERPL: 1.8 {RATIO} (ref 1–2)
ALP LIVER SERPL-CCNC: 96 U/L (ref 46–118)
ALT SERPL-CCNC: 39 U/L (ref 10–49)
ANION GAP SERPL CALC-SCNC: 9 MMOL/L (ref 0–18)
AST SERPL-CCNC: 18 U/L (ref ?–34)
BACTERIA BLD CULT: POSITIVE
BASOPHILS # BLD AUTO: 0.03 X10(3) UL (ref 0–0.2)
BASOPHILS NFR BLD AUTO: 0.4 %
BILIRUB SERPL-MCNC: 0.4 MG/DL (ref 0.3–1.2)
BLACTX-M ISLT/SPM QL: NOT DETECTED
BUN BLD-MCNC: <5 MG/DL (ref 9–23)
CALCIUM BLD-MCNC: 7.6 MG/DL (ref 8.7–10.4)
CHLORIDE SERPL-SCNC: 112 MMOL/L (ref 98–112)
CO2 SERPL-SCNC: 20 MMOL/L (ref 21–32)
CREAT BLD-MCNC: 0.57 MG/DL (ref 0.55–1.02)
DEPRECATED RDW RBC AUTO: 51.8 FL (ref 35.1–46.3)
E COLI DNA BLD POS QL NAA+NON-PROBE: DETECTED
EGFRCR SERPLBLD CKD-EPI 2021: 107 ML/MIN/1.73M2 (ref 60–?)
EOSINOPHIL # BLD AUTO: 0.22 X10(3) UL (ref 0–0.7)
EOSINOPHIL NFR BLD AUTO: 2.8 %
ERYTHROCYTE [DISTWIDTH] IN BLOOD BY AUTOMATED COUNT: 15.3 % (ref 11–15)
GLOBULIN PLAS-MCNC: 1.9 G/DL (ref 2–3.5)
GLUCOSE BLD-MCNC: 114 MG/DL (ref 70–99)
GLUCOSE BLDC GLUCOMTR-MCNC: 106 MG/DL (ref 70–99)
GLUCOSE BLDC GLUCOMTR-MCNC: 123 MG/DL (ref 70–99)
GLUCOSE BLDC GLUCOMTR-MCNC: 130 MG/DL (ref 70–99)
GLUCOSE BLDC GLUCOMTR-MCNC: 94 MG/DL (ref 70–99)
HCT VFR BLD AUTO: 34.6 % (ref 35–48)
HGB BLD-MCNC: 10.9 G/DL (ref 12–16)
IMM GRANULOCYTES # BLD AUTO: 0.03 X10(3) UL (ref 0–1)
IMM GRANULOCYTES NFR BLD: 0.4 %
LYMPHOCYTES # BLD AUTO: 1.15 X10(3) UL (ref 1–4)
LYMPHOCYTES NFR BLD AUTO: 14.5 %
MAGNESIUM SERPL-MCNC: 1.6 MG/DL (ref 1.6–2.6)
MCH RBC QN AUTO: 28.6 PG (ref 26–34)
MCHC RBC AUTO-ENTMCNC: 31.5 G/DL (ref 31–37)
MCV RBC AUTO: 90.8 FL (ref 80–100)
MONOCYTES # BLD AUTO: 0.91 X10(3) UL (ref 0.1–1)
MONOCYTES NFR BLD AUTO: 11.4 %
NEUTROPHILS # BLD AUTO: 5.61 X10 (3) UL (ref 1.5–7.7)
NEUTROPHILS # BLD AUTO: 5.61 X10(3) UL (ref 1.5–7.7)
NEUTROPHILS NFR BLD AUTO: 70.5 %
PHOSPHATE SERPL-MCNC: 2.8 MG/DL (ref 2.4–5.1)
PLATELET # BLD AUTO: 229 10(3)UL (ref 150–450)
POTASSIUM SERPL-SCNC: 2.6 MMOL/L (ref 3.5–5.1)
POTASSIUM SERPL-SCNC: 2.6 MMOL/L (ref 3.5–5.1)
POTASSIUM SERPL-SCNC: 3.1 MMOL/L (ref 3.5–5.1)
PROT SERPL-MCNC: 5.3 G/DL (ref 5.7–8.2)
RBC # BLD AUTO: 3.81 X10(6)UL (ref 3.8–5.3)
SODIUM SERPL-SCNC: 141 MMOL/L (ref 136–145)
WBC # BLD AUTO: 8 X10(3) UL (ref 4–11)

## 2025-06-27 PROCEDURE — 99233 SBSQ HOSP IP/OBS HIGH 50: CPT | Performed by: INTERNAL MEDICINE

## 2025-06-27 PROCEDURE — 74177 CT ABD & PELVIS W/CONTRAST: CPT | Performed by: INTERNAL MEDICINE

## 2025-06-27 RX ORDER — POTASSIUM CHLORIDE 14.9 MG/ML
20 INJECTION INTRAVENOUS ONCE
Status: COMPLETED | OUTPATIENT
Start: 2025-06-27 | End: 2025-06-27

## 2025-06-27 RX ORDER — LEVOFLOXACIN 750 MG/1
750 TABLET, FILM COATED ORAL DAILY
Status: DISCONTINUED | OUTPATIENT
Start: 2025-06-27 | End: 2025-06-29

## 2025-06-27 RX ORDER — MAGNESIUM OXIDE 400 MG/1
400 TABLET ORAL ONCE
Status: COMPLETED | OUTPATIENT
Start: 2025-06-27 | End: 2025-06-27

## 2025-06-27 NOTE — PROGRESS NOTES
[facility]  part of [facility]     Hospitalist Progress Note     Navin Pardo Patient Status:  Inpatient    3/25/1970 MRN [id number]   Location [facility] 5SW/SE Attending Luca Pulliam MD   Hosp Day # 3 PCP UZIEL SEPULVEDA     Subjective:     Patient seen and examined at the bedside.  Laying in bed in no apparent discomfort.  States that she continues to have nausea and was not able to eat anything yesterday.  She ordered chicken broth but the smell of it made her very nauseous so she was not able to eat.  Denies any abdominal pain, dysuria, burning micturition, chest pain or shortness of breath.  States that she had fever again last night or early this morning.      Objective:    Review of Systems:   ROS completed; pertinent positive and negatives stated in subjective.      Vital signs:  Temp:  [97.6 °F (36.4 °C)-101.6 °F (38.7 °C)] 99.5 °F (37.5 °C)  Pulse:  [] 108  Resp:  [18] 18  BP: (108-123)/(53-65) 108/60  SpO2:  [96 %-98 %] 96 %      Physical Exam:    Gen: NAD AO x3  Chest: good air entry CTABL  CVS: normal s1 and s2 RR  Abd: NABS soft NT ND  Neuro: CN 2-12 grossly intact  Ext: no edema in bilateral LE      Diagnostic Data:    Labs:  Recent Labs   Lab 25  1444 25  0558 25  0640   WBC 18.3* 19.6* 8.0   HGB 13.1 10.4* 10.9*   MCV 88.5 88.8 90.8   .0 194.0 229.0   BAND  --  27  --        Recent Labs   Lab 25  1444 25  0558 25  1801   * 124*  --    BUN 11 11  --    CREATSERUM 0.86 0.74  --    CA 9.3 6.9*  --    ALB 4.3  --   --    * 141  --    K 4.1 2.8* 3.1*    110  --    CO2 24.0 20.0*  --    ALKPHO 104  --   --    AST 39*  --   --    ALT 60*  --   --    BILT 1.0  --   --    TP 6.7  --   --        Estimated Creatinine Clearance: 68.7 mL/min (based on SCr of 0.74 mg/dL).    No results for input(s): \"PTP\", \"INR\" in the last 168 hours.           Imaging: Imaging data reviewed in Epic.    Medications:  Scheduled Medications[1]    Assessment & Plan:     Sepsis secondary to E. coli UTI , Polynephritis and bacteremia  Patient presented to the emergency department with complaints of fever, body ache and chills.  On presentation she was found to be febrile, tachycardic, normotensive and was saturating well on room air  On admission, she was found to have UA suggestive of infection, CT of the abdomen showed evidence of bilateral pyelonephritis, no evidence of calculus or hydronephrosis  Patient admitted to the hospital, started on IV antibiotics, IV fluids and infectious disease has been consulted  Urine culture growing E. coli, blood culture also growing E. Coli.   Patient's repeat blood cultures have been negative to date but she continues to have persistent high-grade fever.    Plan  Repeat CT of the abdomen and pelvis to rule out renal abscess  Continue IV cefepime as per infectious disease recommendations  Continue IV fluids  Follow repeat blood cultures    Nausea and vomiting  Most likely secondary to infection and antibiotics  Started on IV PPI  CT of the abdomen pelvis ordered today  As needed nausea medications        Plan of care discussed with patient or family at bedside.      Supplementary Documentation:     Quality:  DVT Prophylaxis: Lovenox  CODE status: Full  Roberto: No  Central line: No      Estimated date of discharge: TBD  Discharge is dependent on: clinical stability  At this point Ms. Pardo is expected to be discharge to: home        Patient will require inpatient services that will reasonably be expected to span two midnight's based on the clinical documentation in H+P.   Based on patients current state of illness, I anticipate that, after discharge, patient will require TBD.      Luca Pulliam MD  Hospitalist    MDM: High, I personally reviewed the available laboratories, imaging including CBC CMP. I discussed the case with patient, RN, son. I ordered laboratories, studies including CBC, CMP.  I adjusted medications   Medical decision making high, risk is high.       The 21st Century Cures Act makes medical notes like these available to patients in the interest of transparency. Please be advised this is a medical document. Medical documents are intended to carry relevant information, facts as evident, and the clinical opinion of the practitioner. The medical note is intended as peer to peer communication and may appear blunt or direct. It is written in medical language and may contain abbreviations or verbiage that are unfamiliar.        [1]    pantoprazole  40 mg Intravenous Q12H    cefepime  1 g Intravenous Q8H    enoxaparin  40 mg Subcutaneous Daily    vancomycin  125 mg Oral Daily

## 2025-06-27 NOTE — OCCUPATIONAL THERAPY NOTE
OCCUPATIONAL THERAPY TREATMENT NOTE - INPATIENT        Room Number: 533/533-A  Presenting Problem: UTI    Problem List  Principal Problem:    Acute pyelonephritis      OCCUPATIONAL THERAPY ASSESSMENT   Patient demonstrates fair progress this session, goals remain in progress.    Patient is requiring stand-by assist and contact guard assist as a result of the following impairments: decreased functional strength, decreased endurance, impaired standing balance, decreased muscular endurance, and medical status.    Patient continues to function below baseline with toileting, bathing, upper body dressing, lower body dressing, grooming, eating, bed mobility, and transfers.  Next session anticipate patient to progress toileting, bathing, upper body dressing, lower body dressing, grooming, eating, bed mobility, and transfers.  Occupational Therapy will continue to follow patient for duration of hospitalization.    Patient continues to benefit from continued skilled OT services: at discharge to promote prior level of function and safety with additional support and return home with home health OT.     PLAN DURING HOSPITALIZATION  OT Device Recommendations: TBD  OT Treatment Plan: Balance activities, Energy conservation/work simplification techniques, ADL training, Functional transfer training, Endurance training, Patient/Family education, Patient/Family training, Compensatory technique education     SUBJECTIVE  \"We can walk\"    OBJECTIVE  Precautions: Bed/chair alarm (Honduran speaking)    PAIN ASSESSMENT  Ratin    ACTIVITY TOLERANCE  Pulse: 119  Heart Rate Source: Monitor  BP: 155/57  BP Location: Right arm  BP Method: Automatic  Patient Position: Sitting    O2 SATURATIONS  Oxygen Therapy  SPO2% Ambulation on Room Air: 99    ACTIVITIES OF DAILY LIVING ASSESSMENT  AM-PAC ‘6-Clicks’ Inpatient Daily Activity Short Form  How much help from another person does the patient currently need…  -   Putting on and taking off regular  lower body clothing?: A Little  -   Bathing (including washing, rinsing, drying)?: A Little  -   Toileting, which includes using toilet, bedpan or urinal? : A Little  -   Putting on and taking off regular upper body clothing?: A Little  -   Taking care of personal grooming such as brushing teeth?: A Little  -   Eating meals?: None    AM-PAC Score:  Score: 19  Approx Degree of Impairment: 42.8%  Standardized Score (AM-PAC Scale): 40.22  CMS Modifier (G-Code): CK    FUNCTIONAL TRANSFER ASSESSMENT  Sit to Stand: Chair  Edge of Bed: Contact Guard Assist  Toilet Transfer: Contact Guard Assist    FUNCTIONAL ADL ASSESSMENT  Grooming Standing: Stand-by Assist  LB Dressing seated: CGA  Toileting Seated: Stand-by Assist    Skilled Therapy Provided: RN approved session, completed in collaboration with PT. Pt received in bedside chair, agreeable to session. Required up to CGA for STS at RW level. Pt able to ambulate bathroom distances with CGA. Completed toileting with SBA, CGA to don depend while seated. Pt completed standing handwashing with SBA, tolerating about 1 minute of standing. Required up to CGA for functional t/f into bedside chair, vitals stable. Pt with increased nausea end of session, RN notified. Needs met and call light within reach.     The patient's Approx Degree of Impairment: 42.8% has been calculated based on documentation in the [facility] '6 clicks' Inpatient Daily Activity Short Form.  Research supports that patients with this level of impairment may benefit from home health.  Final disposition will be made by interdisciplinary medical team.    OT Goals:  Patients self stated goal is: none stated      Patient will complete functional transfer with mod I   Comment: ongoing; CGA    Patient will complete toileting with mod I   Comment: ongoing; SBA    Patient will tolerate standing for 5 minutes in prep for adls with mod I    Comment: ongoing; tolerated about 1 min    Patient will complete item retrieval with  mod I   Comment: ongoing; CGA            Goals  on: 25  Frequency: 3-5x/week    OT Session Time: 23 minutes  Self-Care Home Management: 23 minutes    JUDITH Rosen OT  [facility]  Inpatient Rehabilitation  Occupational Therapy  [phone]

## 2025-06-27 NOTE — DISCHARGE INSTRUCTIONS
For more information, submit a physician request form or call our Physician Referral line at [phone], Monday through Friday from 7 a.m. to 6 p.m. and Saturdays from 7 a.m. to 1 p.m. We can also help you find a physical therapist, counselor, or nurse practitioner/APN to address your healthcare needs

## 2025-06-27 NOTE — PROGRESS NOTES
INFECTIOUS DISEASE PROGRESS NOTE  [facility]  part of [facility] ID PROGRESS NOTE    Navin Pardo Patient Status:  Inpatient    3/25/1970 MRN [id number]   Location [facility] 5SW/SE Attending Luca Pulliam MD   Hosp Day # 3 PCP UZIEL SEPULVEDA     Subjective:  ROS reviewed. Tmax 101.6. Eating better.     ASSESSMENT:    Antibiotics: IV cefepime, OVP; (IV meropenem, ceftriaxone)     # Acute E. Coli bacteremia in 2/2 BCx from  from [facility] with CT A/P howing bilatera pyelonephritis  # Nausea/vomiting  # Morbid obesity     PLAN:  -  Stop cefepime and start levaquin. FU repeat CT A/P. Anticipate DC on PO levaquin x 10 days.  -  Follow fever curve, wbc.  -  Reviewed labs, micro, imaging reports, available old records.  -  Case d/w patient, son on phone, RN.     History of Present Illness:  55 year old with history of morbid obesity, diabetes, depression who presents to hospital on  with nausea, vomiting, headache, myalgias, subjective fevers and chills, frequency over the past 3 days with progressive worsening.  Here noted to be febrile to 102.9 with WBC 19.6 and UA with pyuria.  Initially started on ceftriaxone and then changed to meropenem.  Blood culture now with E. coli in both sets not ESBL and urine culture pending.  CT A/P with enhancement of the right greater than left kidney and urothelial thickening with perinephric stranding and bladder thickening.    Physical Exam:  /62 (BP Location: Right arm)   Pulse 98   Temp 98.9 °F (37.2 °C) (Oral)   Resp 18   Ht 5' 2.5\" (1.588 m)   Wt 260 lb 4.8 oz (118.1 kg)   LMP 2017   SpO2 93%   BMI 46.85 kg/m²     Gen:   Awake, Mongolian speaking  HEENT:  EOMI, neck supple  CV/lungs:  Regular rate and rhythm, CTAB  Abdom:  Soft, obese  Skin/extrem:  No rashes, no c/c/e  Lines:  PIV+    Laboratory Data: Reviewed    Microbiology: Reviewed    Radiology: Reviewed      MAVIS Mosquera  [facility]  [phone]  6/27/2025

## 2025-06-27 NOTE — PLAN OF CARE
Problem: Diabetes/Glucose Control  Goal: Glucose maintained within prescribed range  Description: INTERVENTIONS:  - Monitor Blood Glucose as ordered  - Assess for signs and symptoms of hyperglycemia and hypoglycemia  - Administer ordered medications to maintain glucose within target range  - Assess barriers to adequate nutritional intake and initiate nutrition consult as needed  - Instruct patient on self management of diabetes  Outcome: Progressing     Problem: Patient Centered Care  Goal: Patient preferences are identified and integrated in the patient's plan of care  Description: Interventions:  - What would you like us to know as we care for you?   - Provide timely, complete, and accurate information to patient/family  - Incorporate patient and family knowledge, values, beliefs, and cultural backgrounds into the planning and delivery of care  - Encourage patient/family to participate in care and decision-making at the level they choose  - Honor patient and family perspectives and choices  Outcome: Progressing     Problem: RISK FOR INFECTION - ADULT  Goal: Absence of fever/infection during anticipated neutropenic period  Description: INTERVENTIONS  - Monitor WBC  - Administer growth factors as ordered  - Implement neutropenic guidelines  Outcome: Progressing     Problem: GASTROINTESTINAL - ADULT  Goal: Minimal or absence of nausea and vomiting  Description: INTERVENTIONS:  - Maintain adequate hydration with IV or PO as ordered and tolerated  - Nasogastric tube to low intermittent suction as ordered  - Evaluate effectiveness of ordered antiemetic medications  - Provide nonpharmacologic comfort measures as appropriate  - Advance diet as tolerated, if ordered  - Obtain nutritional consult as needed  - Evaluate fluid balance  Outcome: Progressing     Problem: METABOLIC/FLUID AND ELECTROLYTES - ADULT  Goal: Glucose maintained within prescribed range  Description: INTERVENTIONS:  - Monitor Blood Glucose as ordered  -  Assess for signs and symptoms of hyperglycemia and hypoglycemia  - Administer ordered medications to maintain glucose within target range  - Assess barriers to adequate nutritional intake and initiate nutrition consult as needed  - Instruct patient on self management of diabetes  Outcome: Progressing  Goal: Electrolytes maintained within normal limits  Description: INTERVENTIONS:  - Monitor labs and rhythm and assess patient for signs and symptoms of electrolyte imbalances  - Administer electrolyte replacement as ordered  - Monitor response to electrolyte replacements, including rhythm and repeat lab results as appropriate  - Fluid restriction as ordered  - Instruct patient on fluid and nutrition restrictions as appropriate  Outcome: Progressing

## 2025-06-27 NOTE — PHYSICAL THERAPY NOTE
PHYSICAL THERAPY TREATMENT NOTE - INPATIENT     Room Number: 533/533-A       Presenting Problem: fevers, aches, chills  Co-Morbidities : Hypertension; depression; anxiety; generalized osteoarthritis; C difficile infection; small-bowel obstruction, treated conservatively.    Problem List  Principal Problem:    Acute pyelonephritis      PHYSICAL THERAPY ASSESSMENT   Patient demonstrates good  progress this session, goals  remain in progress.      Patient is requiring stand-by assist as a result of the following impairments: decreased functional strength, decreased endurance/aerobic capacity, impaired standing balance, decreased muscular endurance, and medical status.     Patient continues to function below baseline with bed mobility, transfers, gait, stair negotiation, standing prolonged periods, and performing household tasks.  Next session anticipate patient to progress bed mobility, transfers, gait, stair negotiation, and standing prolonged periods.  Physical Therapy will continue to follow patient for duration of hospitalization.    Patient continues to benefit from continued skilled PT services: at discharge to promote prior level of function and safety with additional support and return home with home health PT.    PLAN DURING HOSPITALIZATION  Nursing Mobility Recommendation : 1 Assist  PT Device Recommendation: Rolling walker  PT Treatment Plan: Bed mobility, Body mechanics, Coordination, Endurance, Energy conservation, Gait training, Strengthening, Stair training, Transfer training, Balance training  Frequency (Obs): 5x/week     SUBJECTIVE  Agreeable     OBJECTIVE  Precautions: Bed/chair alarm (Yemeni speaking)    PAIN ASSESSMENT   Ratin    BALANCE  Static Sitting: Good  Dynamic Sitting: Fair +  Static Standing: Fair  Dynamic Standing: Fair -    ACTIVITY TOLERANCE  Pulse: 119 (with activity)  Heart Rate Source: Monitor     BP: 155/57  BP Location: Right arm  BP Method: Automatic  Patient Position:  Sitting     O2 WALK  Oxygen Therapy  SPO2% Ambulation on Room Air: 99    AM-PAC '6-Clicks' INPATIENT SHORT FORM - BASIC MOBILITY  How much difficulty does the patient currently have...  Patient Difficulty: Turning over in bed (including adjusting bedclothes, sheets and blankets)?: A Little   Patient Difficulty: Sitting down on and standing up from a chair with arms (e.g., wheelchair, bedside commode, etc.): A Little   Patient Difficulty: Moving from lying on back to sitting on the side of the bed?: A Little   How much help from another person does the patient currently need...   Help from Another: Moving to and from a bed to a chair (including a wheelchair)?: A Little   Help from Another: Need to walk in hospital room?: A Little   Help from Another: Climbing 3-5 steps with a railing?: A Lot     AM-PAC Score:  Raw Score: 17   Approx Degree of Impairment: 50.57%   Standardized Score (AM-PAC Scale): 42.13   CMS Modifier (G-Code): CK    FUNCTIONAL ABILITY STATUS  Functional Mobility/Gait Assessment  Gait Assistance:  (SBA)  Distance (ft): 20 ft x 2 and 125 ft  Assistive Device: Rolling walker  Pattern: Shuffle (decreased zaid speed, decreased step length, slightly wide ROSITA)  Sit to Stand: stand-by assist from bedside chair and toilet    Skilled Therapy Provided: Patient sitting in bedside chair upon arrival. RN approved activity. Educated patient on POC and benefits of mobilization. Agreeable to participate. Patient reporting no pain. Patient benefits from increased time and rest breaks in order to complete functional mobility tasks. Assisted patient with ambulating to/from bathroom and hallway ambulation. Patient with SOB with activity, SpO2 99%; cues provided for slow, pursed lip breathing. Encouraged patient to ambulate to/from bathroom versus solely relying on purewick system.     The patient's Approx Degree of Impairment: 50.57% has been calculated based on documentation in the [facility] '6 clicks' Inpatient Daily  Activity Short Form.  Research supports that patients with this level of impairment may benefit from HHPT.  Final disposition will be made by interdisciplinary medical team.    Patient End of Session: Up in chair, Needs met, Call light within reach, RN aware of session/findings, All patient questions and concerns addressed, Hospital anti-slip socks, Alarm set    CURRENT GOALS   Goals to be met by: 7/9/25  Patient Goal Patient's self-stated goal is: to return home   Goal #1 Patient is able to demonstrate supine - sit EOB @ level: modified independent      Goal #1   Current Status  NT   Goal #2 Patient is able to demonstrate transfers Sit to/from Stand at assistance level: modified independent with least restrictive device.      Goal #2  Current Status  SBA with RW   Goal #3 Patient is able to ambulate >150 feet with assist device: least restrictive device at assistance level: modified independent   Goal #3   Current Status  SBA 20 ft x 2 and 125 ft with RW   Goal #4 Patient will negotiate 19 stairs/one curb w/ assistive device and supervision   Goal #4   Current Status  NT   Goal #5 Patient to demonstrate independence with home activity/exercise instructions provided to patient in preparation for discharge.   Goal #5   Current Status  Ongoing     Gait Training: 15 minutes  Therapeutic Activity: 15 minutes

## 2025-06-27 NOTE — PLAN OF CARE
Problem: Diabetes/Glucose Control  Goal: Glucose maintained within prescribed range  Description: INTERVENTIONS:  - Monitor Blood Glucose as ordered  - Assess for signs and symptoms of hyperglycemia and hypoglycemia  - Administer ordered medications to maintain glucose within target range  - Assess barriers to adequate nutritional intake and initiate nutrition consult as needed  - Instruct patient on self management of diabetes  Outcome: Progressing     Problem: Patient Centered Care  Goal: Patient preferences are identified and integrated in the patient's plan of care  Description: Interventions:  - Provide timely, complete, and accurate information to patient/family  - Incorporate patient and family knowledge, values, beliefs, and cultural backgrounds into the planning and delivery of care  - Encourage patient/family to participate in care and decision-making at the level they choose  - Honor patient and family perspectives and choices  Outcome: Progressing     Problem: RISK FOR INFECTION - ADULT  Goal: Absence of fever/infection during anticipated neutropenic period  Description: INTERVENTIONS  - Monitor WBC  - Administer growth factors as ordered  - Implement neutropenic guidelines  Outcome: Progressing     Problem: GASTROINTESTINAL - ADULT  Goal: Minimal or absence of nausea and vomiting  Description: INTERVENTIONS:  - Maintain adequate hydration with IV or PO as ordered and tolerated  - Nasogastric tube to low intermittent suction as ordered  - Evaluate effectiveness of ordered antiemetic medications  - Provide nonpharmacologic comfort measures as appropriate  - Advance diet as tolerated, if ordered  - Obtain nutritional consult as needed  - Evaluate fluid balance  Outcome: Progressing     Problem: METABOLIC/FLUID AND ELECTROLYTES - ADULT  Goal: Glucose maintained within prescribed range  Description: INTERVENTIONS:  - Monitor Blood Glucose as ordered  - Assess for signs and symptoms of hyperglycemia and  hypoglycemia  - Administer ordered medications to maintain glucose within target range  - Assess barriers to adequate nutritional intake and initiate nutrition consult as needed  - Instruct patient on self management of diabetes  Outcome: Progressing  Goal: Electrolytes maintained within normal limits  Description: INTERVENTIONS:  - Monitor labs and rhythm and assess patient for signs and symptoms of electrolyte imbalances  - Administer electrolyte replacement as ordered  - Monitor response to electrolyte replacements, including rhythm and repeat lab results as appropriate  - Fluid restriction as ordered  - Instruct patient on fluid and nutrition restrictions as appropriate  Outcome: Progressing     Problem: Patient/Family Goals  Goal: Patient/Family Long Term Goal  Description: Patient's Long Term Goal: DC home    Interventions:  - - Monitor vitals  - Monitor appropriate labs  - Administer medications as ordered  - Follow MD's orders  - Update patient on plan of care   - Discharge planning     - See additional Care Plan goals for specific interventions  Outcome: Progressing  Goal: Patient/Family Short Term Goal  Description: Patient's Short Term Goal: feel better    Interventions:   - prn meds  -monitor vitals  -iv antibiotics  - See additional Care Plan goals for specific interventions  Outcome: Progressing

## 2025-06-28 LAB
ALBUMIN SERPL-MCNC: 3.2 G/DL (ref 3.2–4.8)
ALBUMIN/GLOB SERPL: 1.7 {RATIO} (ref 1–2)
ALP LIVER SERPL-CCNC: 77 U/L (ref 46–118)
ALT SERPL-CCNC: 29 U/L (ref 10–49)
ANION GAP SERPL CALC-SCNC: 9 MMOL/L (ref 0–18)
AST SERPL-CCNC: 13 U/L (ref ?–34)
BASOPHILS # BLD AUTO: 0.02 X10(3) UL (ref 0–0.2)
BASOPHILS NFR BLD AUTO: 0.4 %
BILIRUB SERPL-MCNC: 0.3 MG/DL (ref 0.3–1.2)
BUN BLD-MCNC: <5 MG/DL (ref 9–23)
CALCIUM BLD-MCNC: 7.9 MG/DL (ref 8.7–10.4)
CHLORIDE SERPL-SCNC: 112 MMOL/L (ref 98–112)
CO2 SERPL-SCNC: 23 MMOL/L (ref 21–32)
CREAT BLD-MCNC: 0.56 MG/DL (ref 0.55–1.02)
DEPRECATED RDW RBC AUTO: 46.8 FL (ref 35.1–46.3)
EGFRCR SERPLBLD CKD-EPI 2021: 107 ML/MIN/1.73M2 (ref 60–?)
EOSINOPHIL # BLD AUTO: 0.28 X10(3) UL (ref 0–0.7)
EOSINOPHIL NFR BLD AUTO: 5.2 %
ERYTHROCYTE [DISTWIDTH] IN BLOOD BY AUTOMATED COUNT: 15.1 % (ref 11–15)
GLOBULIN PLAS-MCNC: 1.9 G/DL (ref 2–3.5)
GLUCOSE BLD-MCNC: 112 MG/DL (ref 70–99)
GLUCOSE BLDC GLUCOMTR-MCNC: 113 MG/DL (ref 70–99)
GLUCOSE BLDC GLUCOMTR-MCNC: 136 MG/DL (ref 70–99)
GLUCOSE BLDC GLUCOMTR-MCNC: 93 MG/DL (ref 70–99)
GLUCOSE BLDC GLUCOMTR-MCNC: 99 MG/DL (ref 70–99)
HCT VFR BLD AUTO: 30.5 % (ref 35–48)
HGB BLD-MCNC: 10 G/DL (ref 12–16)
IMM GRANULOCYTES # BLD AUTO: 0.04 X10(3) UL (ref 0–1)
IMM GRANULOCYTES NFR BLD: 0.7 %
LYMPHOCYTES # BLD AUTO: 1.33 X10(3) UL (ref 1–4)
LYMPHOCYTES NFR BLD AUTO: 24.9 %
MAGNESIUM SERPL-MCNC: 1.6 MG/DL (ref 1.6–2.6)
MCH RBC QN AUTO: 27.6 PG (ref 26–34)
MCHC RBC AUTO-ENTMCNC: 32.8 G/DL (ref 31–37)
MCV RBC AUTO: 84.3 FL (ref 80–100)
MONOCYTES # BLD AUTO: 0.89 X10(3) UL (ref 0.1–1)
MONOCYTES NFR BLD AUTO: 16.7 %
NEUTROPHILS # BLD AUTO: 2.78 X10 (3) UL (ref 1.5–7.7)
NEUTROPHILS # BLD AUTO: 2.78 X10(3) UL (ref 1.5–7.7)
NEUTROPHILS NFR BLD AUTO: 52.1 %
PHOSPHATE SERPL-MCNC: 3.1 MG/DL (ref 2.4–5.1)
PLATELET # BLD AUTO: 242 10(3)UL (ref 150–450)
POTASSIUM SERPL-SCNC: 2.7 MMOL/L (ref 3.5–5.1)
PROT SERPL-MCNC: 5.1 G/DL (ref 5.7–8.2)
RBC # BLD AUTO: 3.62 X10(6)UL (ref 3.8–5.3)
SODIUM SERPL-SCNC: 144 MMOL/L (ref 136–145)
WBC # BLD AUTO: 5.3 X10(3) UL (ref 4–11)

## 2025-06-28 PROCEDURE — 99233 SBSQ HOSP IP/OBS HIGH 50: CPT | Performed by: INTERNAL MEDICINE

## 2025-06-28 RX ORDER — METOCLOPRAMIDE HYDROCHLORIDE 5 MG/ML
10 INJECTION INTRAMUSCULAR; INTRAVENOUS ONCE
Status: COMPLETED | OUTPATIENT
Start: 2025-06-28 | End: 2025-06-28

## 2025-06-28 RX ORDER — LEVOFLOXACIN 750 MG/1
750 TABLET, FILM COATED ORAL DAILY
Qty: 10 TABLET | Refills: 0 | Status: SHIPPED | OUTPATIENT
Start: 2025-06-28 | End: 2025-06-29

## 2025-06-28 RX ORDER — DIPHENHYDRAMINE HYDROCHLORIDE 50 MG/ML
25 INJECTION, SOLUTION INTRAMUSCULAR; INTRAVENOUS ONCE
Status: COMPLETED | OUTPATIENT
Start: 2025-06-28 | End: 2025-06-28

## 2025-06-28 RX ORDER — POTASSIUM CHLORIDE 14.9 MG/ML
20 INJECTION INTRAVENOUS ONCE
Status: COMPLETED | OUTPATIENT
Start: 2025-06-28 | End: 2025-06-28

## 2025-06-28 NOTE — PROGRESS NOTES
INFECTIOUS DISEASE PROGRESS NOTE  [facility]  part of [facility] ID PROGRESS NOTE    Navin Pardo Patient Status:  Inpatient    3/25/1970 MRN [id number]   Location [facility] 5SW/SE Attending Luca Pulliam MD   Hosp Day # 4 PCP UZIEL SEPULVEDA     Subjective:  ROS reviewed. Fevers improved. Still with nausea but trying to eat more.    ASSESSMENT:    Antibiotics: Levaquin, OVP; (IV meropenem, ceftriaxone, cefepime)     # Acute E. Coli bacteremia in 2/2 BCx from  from [facility] with CT A/P howing bilateral pyelonephritis  # Nausea/vomiting  # Morbid obesity     PLAN:  - Continue on levaquin x 10 days.  -  Follow fever curve, wbc.  -  Reviewed labs, micro, imaging reports, available old records.  -  Case d/w patient, son on phone, RN.     History of Present Illness:  55 year old with history of morbid obesity, diabetes, depression who presents to hospital on  with nausea, vomiting, headache, myalgias, subjective fevers and chills, frequency over the past 3 days with progressive worsening.  Here noted to be febrile to 102.9 with WBC 19.6 and UA with pyuria.  Initially started on ceftriaxone and then changed to meropenem.  Blood culture now with E. coli in both sets not ESBL and urine culture pending.  CT A/P with enhancement of the right greater than left kidney and urothelial thickening with perinephric stranding and bladder thickening.    Physical Exam:  /71 (BP Location: Right arm)   Pulse 91   Temp 98.6 °F (37 °C) (Oral)   Resp 18   Ht 5' 2.5\" (1.588 m)   Wt 260 lb 4.8 oz (118.1 kg)   LMP 2017   SpO2 93%   BMI 46.85 kg/m²     Gen:   Awake, Estonian speaking  HEENT:  EOMI, neck supple  CV/lungs:  Regular rate and rhythm, CTAB  Abdom:  Soft, obese  Skin/extrem:  No rashes, no c/c/e  Lines:  PIV+    Laboratory Data: Reviewed    Microbiology: Reviewed    Radiology: Reviewed      MAVIS Mosquera [facility]  [phone]  6/28/2025

## 2025-06-28 NOTE — PROGRESS NOTES
[facility]  part of [facility]     Hospitalist Progress Note     Navin Pardo Patient Status:  Inpatient    3/25/1970 MRN [id number]   Location [facility] 5SW/SE Attending Luca Pulliam MD   Hosp Day # 4 PCP UZIEL SEPULVEDA     Subjective:     Patient seen and examined at the bedside.  Laying in bed in no apparent discomfort.  States that she continues to have nausea and vomiting and has not been able to eat anything for the past 2 days.  Denies any abdominal pain or any other complaints.      Objective:    Review of Systems:   ROS completed; pertinent positive and negatives stated in subjective.      Vital signs:  Temp:  [98.3 °F (36.8 °C)-99.1 °F (37.3 °C)] 98.6 °F (37 °C)  Pulse:  [] 91  Resp:  [18] 18  BP: (120-155)/(57-87) 127/71  SpO2:  [93 %-98 %] 93 %      Physical Exam:    Gen: NAD AO x3  Chest: good air entry CTABL  CVS: normal s1 and s2 RR  Abd: NABS soft NT ND  Neuro: CN 2-12 grossly intact  Ext: no edema in bilateral LE      Diagnostic Data:    Labs:  Recent Labs   Lab 25  1444 25  0558 25  0640 25  0604   WBC 18.3* 19.6* 8.0 5.3   HGB 13.1 10.4* 10.9* 10.0*   MCV 88.5 88.8 90.8 84.3   .0 194.0 229.0 242.0   BAND  --  27  --   --        Recent Labs   Lab 25  1444 25  0558 25  1801 25  0640 25  1915 25  0604   * 124*  --  114*  --  112*   BUN 11 11  --  <5*  --  <5*   CREATSERUM 0.86 0.74  --  0.57  --  0.56   CA 9.3 6.9*  --  7.6*  --  7.9*   ALB 4.3  --   --  3.4  --  3.2   * 141  --  141  --  144   K 4.1 2.8*   < > 2.6*  2.6* 3.1* 2.7*    110  --  112  --  112   CO2 24.0 20.0*  --  20.0*  --  23.0   ALKPHO 104  --   --  96  --  77   AST 39*  --   --  18  --  13   ALT 60*  --   --  39  --  29   BILT 1.0  --   --  0.4  --  0.3   TP 6.7  --   --  5.3*  --  5.1*    < > = values in this interval not displayed.       Estimated Creatinine Clearance: 90.8 mL/min (based on SCr  of 0.56 mg/dL).    No results for input(s): \"PTP\", \"INR\" in the last 168 hours.           Imaging: Imaging data reviewed in Epic.    Medications: Scheduled Medications[1]    Assessment & Plan:     Sepsis secondary to E. coli UTI , Polynephritis and bacteremia  Patient presented to the emergency department with complaints of fever, body ache and chills.  On presentation she was found to be febrile, tachycardic, normotensive and was saturating well on room air  On admission, she was found to have UA suggestive of infection, CT of the abdomen showed evidence of bilateral pyelonephritis, no evidence of calculus or hydronephrosis  Patient admitted to the hospital, started on IV antibiotics, IV fluids and infectious disease has been consulted  Urine culture growing E. coli, blood culture also growing E. Coli.   Patient's repeat blood cultures have been negative to date but she continues to have persistent high-grade fever.  CT of the abdomen was repeated which showed pyelonephritis and no evidence of abscess  Patient has been afebrile for more than 24 hours now    Plan  Cefepime has been switched to p.o. Levaquin  Continue IV fluids  Follow repeat blood cultures    Nausea and vomiting  Most likely secondary to infection and antibiotics  Started on IV PPI  Ordered Reglan and Benadryl  CT of the abdomen pelvis noted  As needed nausea medications        Plan of care discussed with patient or family at bedside.      Supplementary Documentation:     Quality:  DVT Prophylaxis: Lovenox  CODE status: Full  Roberto: No  Central line: No      Estimated date of discharge: TBD  Discharge is dependent on: clinical stability  At this point Ms. Pardo is expected to be discharge to: home        Patient will require inpatient services that will reasonably be expected to span two midnight's based on the clinical documentation in H+P.   Based on patients current state of illness, I anticipate that, after discharge, patient will require  ADEBAYOD.      Luca Pulliam MD  Hospitalist    MDM: High, I personally reviewed the available laboratories, imaging including CBC CMP. I discussed the case with patient, RN, son. I ordered laboratories, studies including CBC, CMP. I adjusted medications   Medical decision making high, risk is high.       The 21st Century Cures Act makes medical notes like these available to patients in the interest of transparency. Please be advised this is a medical document. Medical documents are intended to carry relevant information, facts as evident, and the clinical opinion of the practitioner. The medical note is intended as peer to peer communication and may appear blunt or direct. It is written in medical language and may contain abbreviations or verbiage that are unfamiliar.        [1]    potassium chloride  40 mEq Intravenous Once    Followed by    potassium chloride  20 mEq Intravenous Once    metoclopramide  10 mg Intravenous Once    diphenhydrAMINE  25 mg Intravenous Once    levoFLOXacin  750 mg Oral Daily    pantoprazole  40 mg Intravenous Q12H    enoxaparin  40 mg Subcutaneous Daily    vancomycin  125 mg Oral Daily

## 2025-06-29 VITALS
SYSTOLIC BLOOD PRESSURE: 142 MMHG | HEIGHT: 62.5 IN | OXYGEN SATURATION: 95 % | WEIGHT: 260.31 LBS | RESPIRATION RATE: 16 BRPM | HEART RATE: 106 BPM | DIASTOLIC BLOOD PRESSURE: 58 MMHG | BODY MASS INDEX: 46.7 KG/M2 | TEMPERATURE: 99 F

## 2025-06-29 LAB
ALBUMIN SERPL-MCNC: 3.3 G/DL (ref 3.2–4.8)
ALBUMIN/GLOB SERPL: 1.7 {RATIO} (ref 1–2)
ALP LIVER SERPL-CCNC: 75 U/L (ref 46–118)
ALT SERPL-CCNC: 25 U/L (ref 10–49)
ANION GAP SERPL CALC-SCNC: 10 MMOL/L (ref 0–18)
AST SERPL-CCNC: 11 U/L (ref ?–34)
BASOPHILS # BLD AUTO: 0.03 X10(3) UL (ref 0–0.2)
BASOPHILS NFR BLD AUTO: 0.5 %
BILIRUB SERPL-MCNC: 0.3 MG/DL (ref 0.3–1.2)
BUN BLD-MCNC: <5 MG/DL (ref 9–23)
CALCIUM BLD-MCNC: 8.1 MG/DL (ref 8.7–10.4)
CHLORIDE SERPL-SCNC: 109 MMOL/L (ref 98–112)
CO2 SERPL-SCNC: 26 MMOL/L (ref 21–32)
CREAT BLD-MCNC: 0.58 MG/DL (ref 0.55–1.02)
DEPRECATED RDW RBC AUTO: 48.6 FL (ref 35.1–46.3)
EGFRCR SERPLBLD CKD-EPI 2021: 106 ML/MIN/1.73M2 (ref 60–?)
EOSINOPHIL # BLD AUTO: 0.34 X10(3) UL (ref 0–0.7)
EOSINOPHIL NFR BLD AUTO: 5.2 %
ERYTHROCYTE [DISTWIDTH] IN BLOOD BY AUTOMATED COUNT: 15.3 % (ref 11–15)
GLOBULIN PLAS-MCNC: 2 G/DL (ref 2–3.5)
GLUCOSE BLD-MCNC: 114 MG/DL (ref 70–99)
GLUCOSE BLDC GLUCOMTR-MCNC: 119 MG/DL (ref 70–99)
GLUCOSE BLDC GLUCOMTR-MCNC: 120 MG/DL (ref 70–99)
GLUCOSE BLDC GLUCOMTR-MCNC: 96 MG/DL (ref 70–99)
HCT VFR BLD AUTO: 31.8 % (ref 35–48)
HGB BLD-MCNC: 10.3 G/DL (ref 12–16)
IMM GRANULOCYTES # BLD AUTO: 0.06 X10(3) UL (ref 0–1)
IMM GRANULOCYTES NFR BLD: 0.9 %
LYMPHOCYTES # BLD AUTO: 1.52 X10(3) UL (ref 1–4)
LYMPHOCYTES NFR BLD AUTO: 23.4 %
MAGNESIUM SERPL-MCNC: 1.4 MG/DL (ref 1.6–2.6)
MCH RBC QN AUTO: 28.2 PG (ref 26–34)
MCHC RBC AUTO-ENTMCNC: 32.4 G/DL (ref 31–37)
MCV RBC AUTO: 87.1 FL (ref 80–100)
MONOCYTES # BLD AUTO: 0.86 X10(3) UL (ref 0.1–1)
MONOCYTES NFR BLD AUTO: 13.3 %
NEUTROPHILS # BLD AUTO: 3.68 X10 (3) UL (ref 1.5–7.7)
NEUTROPHILS # BLD AUTO: 3.68 X10(3) UL (ref 1.5–7.7)
NEUTROPHILS NFR BLD AUTO: 56.7 %
PHOSPHATE SERPL-MCNC: 3.2 MG/DL (ref 2.4–5.1)
PLATELET # BLD AUTO: 291 10(3)UL (ref 150–450)
POTASSIUM SERPL-SCNC: 2.9 MMOL/L (ref 3.5–5.1)
PROT SERPL-MCNC: 5.3 G/DL (ref 5.7–8.2)
RBC # BLD AUTO: 3.65 X10(6)UL (ref 3.8–5.3)
SODIUM SERPL-SCNC: 145 MMOL/L (ref 136–145)
WBC # BLD AUTO: 6.5 X10(3) UL (ref 4–11)

## 2025-06-29 PROCEDURE — 99239 HOSP IP/OBS DSCHRG MGMT >30: CPT | Performed by: INTERNAL MEDICINE

## 2025-06-29 RX ORDER — IPRATROPIUM BROMIDE AND ALBUTEROL SULFATE 2.5; .5 MG/3ML; MG/3ML
3 SOLUTION RESPIRATORY (INHALATION) ONCE
Status: COMPLETED | OUTPATIENT
Start: 2025-06-29 | End: 2025-06-29

## 2025-06-29 RX ORDER — LEVOFLOXACIN 750 MG/1
750 TABLET, FILM COATED ORAL DAILY
Qty: 10 TABLET | Refills: 0 | Status: SHIPPED | OUTPATIENT
Start: 2025-06-29 | End: 2025-07-09

## 2025-06-29 RX ORDER — METOCLOPRAMIDE HYDROCHLORIDE 5 MG/ML
10 INJECTION INTRAMUSCULAR; INTRAVENOUS EVERY 6 HOURS PRN
Status: DISCONTINUED | OUTPATIENT
Start: 2025-06-29 | End: 2025-06-29

## 2025-06-29 RX ORDER — METOCLOPRAMIDE 5 MG/1
5 TABLET ORAL
Qty: 12 TABLET | Refills: 0 | Status: SHIPPED | OUTPATIENT
Start: 2025-06-29 | End: 2025-06-29

## 2025-06-29 RX ORDER — METOCLOPRAMIDE 5 MG/1
5 TABLET ORAL
Qty: 12 TABLET | Refills: 0 | Status: SHIPPED | OUTPATIENT
Start: 2025-06-29

## 2025-06-29 RX ORDER — IBUPROFEN 600 MG/1
600 TABLET, FILM COATED ORAL EVERY 6 HOURS PRN
Status: DISCONTINUED | OUTPATIENT
Start: 2025-06-29 | End: 2025-06-29

## 2025-06-29 RX ORDER — POTASSIUM CHLORIDE 14.9 MG/ML
20 INJECTION INTRAVENOUS ONCE
Status: COMPLETED | OUTPATIENT
Start: 2025-06-29 | End: 2025-06-29

## 2025-06-29 NOTE — DISCHARGE SUMMARY
[facility]  part of [facility]    DISCHARGE SUMMARY     Navin Pardo Patient Status:  Inpatient    3/25/1970 MRN [id number]   Location [facility] 5SW/SE Attending Luca Pulliam MD   Hosp Day # 5 PCP UZIEL SEPULVEDA     Date of Admission: 2025  Date of Discharge:  2025    Discharge Disposition: Home or Self Care    Discharge Diagnosis:     Pyelonephritis    History of Present Illness:     55-year-old  female presented to the emergency department for evaluation of fevers, body aches, and chills. CBC showed white blood cell count of 18.3 with left shift. Chemistry was unremarkable. Sodium 134. Urinalysis showed evidence of urinary tract infection. CT scan of the abdomen and pelvis showed cystitis with ascending urinary tract infection and bilateral pyelonephritis, right more than left. Started on IV Rocephin. Patient had sinus tachycardia upon arrival and temperature of 102.9. Blood and urine cultures were obtained.     Brief Synopsis:     Sepsis secondary to E. coli UTI , Polynephritis and bacteremia  Patient presented to the emergency department with complaints of fever, body ache and chills.  On presentation she was found to be febrile, tachycardic, normotensive and was saturating well on room air  On admission, she was found to have UA suggestive of infection, CT of the abdomen showed evidence of bilateral pyelonephritis, no evidence of calculus or hydronephrosis  Patient admitted to the hospital, started on IV antibiotics, IV fluids and infectious disease has been consulted  Urine culture growing E. coli, blood culture also growing E. Coli.   Patient's repeat blood cultures have been negative to date but she continues to have persistent high-grade fever.  CT of the abdomen was repeated which showed pyelonephritis and no evidence of abscess  Patient has been afebrile for more than 24 hours now     Plan  Continue Po Abx       Nausea and vomiting:  RESOLVED  Most likely secondary to infection and antibiotics  Ordered Reglan and Benadryl  CT of the abdomen pelvis noted  As needed nausea medications    Patient is to remain compliant with all discharge medications and instructions and to follow up as advised.   Patient encouraged to make healthy lifestyle and dietary changes.    Lace+ Score: 28  59-90 High Risk  29-58 Medium Risk  0-28   Low Risk       TCM Follow-Up Recommendation:  LACE < 29: Low Risk of readmission after discharge from the hospital. No TCM follow-up needed.      Procedures during hospitalization:   None    Incidental or significant findings and recommendations (brief descriptions):  None    Lab/Test results pending at Discharge:   None    Consultants:  Consultants         Provider   Role Specialty     Kosta Day MD      Consulting Physician INFECTIOUS DISEASES            Discharge Medication List:     Discharge Medications        START taking these medications        Instructions Prescription details   levoFLOXacin 750 MG Tabs  Commonly known as: Levaquin      Take 1 tablet (750 mg total) by mouth daily for 10 days.   Stop taking on: July 8, 2025  Quantity: 10 tablet  Refills: 0     metoclopramide 5 MG Tabs  Commonly known as: Reglan      Take 1 tablet (5 mg total) by mouth 3 (three) times daily before meals as needed.   Quantity: 12 tablet  Refills: 0            CONTINUE taking these medications        Instructions Prescription details   buPROPion  MG Tb12  Commonly known as: WELLBUTRIN SR      Take 1 tablet (150 mg total) by mouth daily.   Refills: 0     ergocalciferol 1.25 MG (60767 UT) Caps  Commonly known as: Vitamin D2      Take 1 capsule (50,000 Units total) by mouth once a week.   Refills: 0     metFORMIN 500 MG Tabs  Commonly known as: Glucophage      Take 1 tablet (500 mg total) by mouth 2 (two) times daily with meals. Per MD stopped med while on ozempic   Refills: 0     ondansetron 4 MG Tbdp  Commonly known as:  Zofran-ODT      Take 1 tablet (4 mg total) by mouth every 12 (twelve) hours as needed for Nausea.   Refills: 0     Ozempic (0.25 or 0.5 MG/DOSE) 2 MG/3ML Sopn  Generic drug: semaglutide      Inject 0.25 mg into the skin once a week.   Refills: 0               Where to Get Your Medications        These medications were sent to [id number]  [place], [place] - [address] [place] [address] [phone], [phone]  [address] [place] [address]      Phone: [phone]   levoFLOXacin 750 MG Tabs  metoclopramide 5 MG Tabs         [place]    Follow-up appointment:   No follow-up provider specified.  Appointments for Next 30 Days 6/29/2025 - 7/29/2025      None            Vital signs:  Temp:  [98.2 °F (36.8 °C)-98.7 °F (37.1 °C)] 98.7 °F (37.1 °C)  Pulse:  [] 105  Resp:  [16-18] 16  BP: (138-150)/(78-81) 148/78  SpO2:  [93 %-98 %] 93 %    Physical Exam:    Gen: NAD AO x3  Chest: good air entry CTABL  CVS: normal s1 and s2 RR  Abd: NABS soft NT ND  Neuro: CN 2-12 grossly intact  Ext: no edema in bilateral LE    -----------------------------------------------------------------------------------------------  PATIENT DISCHARGE INSTRUCTIONS: See electronic chart    Luca Pulliam MD  Hospitalist    Time spent:  > 30 minutes    The 21st Century Cures Act makes medical notes like these available to patients in the interest of transparency. Please be advised this is a medical document. Medical documents are intended to carry relevant information, facts as evident, and the clinical opinion of the practitioner. The medical note is intended as peer to peer communication and may appear blunt or direct. It is written in medical language and may contain abbreviations or verbiage that are unfamiliar.

## 2025-06-29 NOTE — PLAN OF CARE
Nausea resolved with zofran. Script for reglan and levaquin picked up from [facility] by son. Eating and urinating good. Discharging in stable condition with son.     Problem: Diabetes/Glucose Control  Goal: Glucose maintained within prescribed range  Description: INTERVENTIONS:  - Monitor Blood Glucose as ordered  - Assess for signs and symptoms of hyperglycemia and hypoglycemia  - Administer ordered medications to maintain glucose within target range  - Assess barriers to adequate nutritional intake and initiate nutrition consult as needed  - Instruct patient on self management of diabetes  Outcome: Adequate for Discharge     Problem: Patient Centered Care  Goal: Patient preferences are identified and integrated in the patient's plan of care  Description: Interventions:  - What would you like us to know as we care for you?   - Provide timely, complete, and accurate information to patient/family  - Incorporate patient and family knowledge, values, beliefs, and cultural backgrounds into the planning and delivery of care  - Encourage patient/family to participate in care and decision-making at the level they choose  - Honor patient and family perspectives and choices  Outcome: Adequate for Discharge     Problem: RISK FOR INFECTION - ADULT  Goal: Absence of fever/infection during anticipated neutropenic period  Description: INTERVENTIONS  - Monitor WBC  - Administer growth factors as ordered  - Implement neutropenic guidelines  Outcome: Adequate for Discharge     Problem: GASTROINTESTINAL - ADULT  Goal: Minimal or absence of nausea and vomiting  Description: INTERVENTIONS:  - Maintain adequate hydration with IV or PO as ordered and tolerated  - Nasogastric tube to low intermittent suction as ordered  - Evaluate effectiveness of ordered antiemetic medications  - Provide nonpharmacologic comfort measures as appropriate  - Advance diet as tolerated, if ordered  - Obtain nutritional consult as needed  - Evaluate fluid  balance  Outcome: Adequate for Discharge     Problem: METABOLIC/FLUID AND ELECTROLYTES - ADULT  Goal: Glucose maintained within prescribed range  Description: INTERVENTIONS:  - Monitor Blood Glucose as ordered  - Assess for signs and symptoms of hyperglycemia and hypoglycemia  - Administer ordered medications to maintain glucose within target range  - Assess barriers to adequate nutritional intake and initiate nutrition consult as needed  - Instruct patient on self management of diabetes  Outcome: Adequate for Discharge  Goal: Electrolytes maintained within normal limits  Description: INTERVENTIONS:  - Monitor labs and rhythm and assess patient for signs and symptoms of electrolyte imbalances  - Administer electrolyte replacement as ordered  - Monitor response to electrolyte replacements, including rhythm and repeat lab results as appropriate  - Fluid restriction as ordered  - Instruct patient on fluid and nutrition restrictions as appropriate  Outcome: Adequate for Discharge     Problem: Patient/Family Goals  Goal: Patient/Family Long Term Goal  Description: Patient's Long Term Goal: DC home    Interventions:  - - Monitor vitals  - Monitor appropriate labs  - Administer medications as ordered  - Follow MD's orders  - Update patient on plan of care   - Discharge planning     - See additional Care Plan goals for specific interventions  Outcome: Adequate for Discharge  Goal: Patient/Family Short Term Goal  Description: Patient's Short Term Goal: feel better    Interventions:   - prn meds  -monitor vitals  -iv antibiotics  - See additional Care Plan goals for specific interventions  Outcome: Adequate for Discharge

## 2025-07-10 ENCOUNTER — HOSPITAL ENCOUNTER (EMERGENCY)
Facility: HOSPITAL | Age: 55
Discharge: HOME | End: 2025-07-10
Attending: EMERGENCY MEDICINE
Payer: MEDICARE

## 2025-07-10 ENCOUNTER — TELEPHONE (OUTPATIENT)
Dept: INTERNAL MEDICINE UNIT | Facility: HOSPITAL | Age: 55
End: 2025-07-10

## 2025-07-10 VITALS
RESPIRATION RATE: 14 BRPM | OXYGEN SATURATION: 96 % | HEART RATE: 101 BPM | DIASTOLIC BLOOD PRESSURE: 54 MMHG | WEIGHT: 270 LBS | BODY MASS INDEX: 53.01 KG/M2 | HEIGHT: 60 IN | TEMPERATURE: 98 F | SYSTOLIC BLOOD PRESSURE: 110 MMHG

## 2025-07-10 DIAGNOSIS — K29.00 ACUTE GASTRITIS WITHOUT HEMORRHAGE, UNSPECIFIED GASTRITIS TYPE: Primary | ICD-10-CM

## 2025-07-10 LAB
ALBUMIN SERPL-MCNC: 4.3 G/DL (ref 3.2–4.8)
ALP LIVER SERPL-CCNC: 82 U/L (ref 46–118)
ALT SERPL-CCNC: 28 U/L (ref 10–49)
ANION GAP SERPL CALC-SCNC: 12 MMOL/L (ref 0–18)
AST SERPL-CCNC: 35 U/L (ref ?–34)
BASOPHILS # BLD AUTO: 0.04 X10(3) UL (ref 0–0.2)
BASOPHILS NFR BLD AUTO: 0.4 %
BILIRUB DIRECT SERPL-MCNC: 0.2 MG/DL (ref ?–0.3)
BILIRUB SERPL-MCNC: 0.5 MG/DL (ref 0.3–1.2)
BUN BLD-MCNC: 9 MG/DL (ref 9–23)
BUN/CREAT SERPL: 13.4 (ref 10–20)
CALCIUM BLD-MCNC: 9 MG/DL (ref 8.7–10.4)
CHLORIDE SERPL-SCNC: 105 MMOL/L (ref 98–112)
CO2 SERPL-SCNC: 29 MMOL/L (ref 21–32)
CREAT BLD-MCNC: 0.67 MG/DL (ref 0.55–1.02)
DEPRECATED RDW RBC AUTO: 50 FL (ref 35.1–46.3)
EGFRCR SERPLBLD CKD-EPI 2021: 103 ML/MIN/1.73M2 (ref 60–?)
EOSINOPHIL # BLD AUTO: 0.29 X10(3) UL (ref 0–0.7)
EOSINOPHIL NFR BLD AUTO: 2.9 %
ERYTHROCYTE [DISTWIDTH] IN BLOOD BY AUTOMATED COUNT: 15.7 % (ref 11–15)
GLUCOSE BLD-MCNC: 128 MG/DL (ref 70–99)
HCT VFR BLD AUTO: 38.7 % (ref 35–48)
HGB BLD-MCNC: 12.6 G/DL (ref 12–16)
IMM GRANULOCYTES # BLD AUTO: 0.02 X10(3) UL (ref 0–1)
IMM GRANULOCYTES NFR BLD: 0.2 %
LYMPHOCYTES # BLD AUTO: 1.19 X10(3) UL (ref 1–4)
LYMPHOCYTES NFR BLD AUTO: 12 %
MCH RBC QN AUTO: 28.5 PG (ref 26–34)
MCHC RBC AUTO-ENTMCNC: 32.6 G/DL (ref 31–37)
MCV RBC AUTO: 87.6 FL (ref 80–100)
MONOCYTES # BLD AUTO: 0.19 X10(3) UL (ref 0.1–1)
MONOCYTES NFR BLD AUTO: 1.9 %
NEUTROPHILS # BLD AUTO: 8.16 X10 (3) UL (ref 1.5–7.7)
NEUTROPHILS # BLD AUTO: 8.16 X10(3) UL (ref 1.5–7.7)
NEUTROPHILS NFR BLD AUTO: 82.6 %
OSMOLALITY SERPL CALC.SUM OF ELEC: 302 MOSM/KG (ref 275–295)
PLATELET # BLD AUTO: 440 10(3)UL (ref 150–450)
POTASSIUM SERPL-SCNC: 3.4 MMOL/L (ref 3.5–5.1)
PROT SERPL-MCNC: 6.6 G/DL (ref 5.7–8.2)
RBC # BLD AUTO: 4.42 X10(6)UL (ref 3.8–5.3)
SODIUM SERPL-SCNC: 146 MMOL/L (ref 136–145)
WBC # BLD AUTO: 9.9 X10(3) UL (ref 4–11)

## 2025-07-10 PROCEDURE — 96361 HYDRATE IV INFUSION ADD-ON: CPT

## 2025-07-10 PROCEDURE — 80048 BASIC METABOLIC PNL TOTAL CA: CPT | Performed by: EMERGENCY MEDICINE

## 2025-07-10 PROCEDURE — 85025 COMPLETE CBC W/AUTO DIFF WBC: CPT | Performed by: EMERGENCY MEDICINE

## 2025-07-10 PROCEDURE — 99284 EMERGENCY DEPT VISIT MOD MDM: CPT

## 2025-07-10 PROCEDURE — 96375 TX/PRO/DX INJ NEW DRUG ADDON: CPT

## 2025-07-10 PROCEDURE — 80076 HEPATIC FUNCTION PANEL: CPT | Performed by: EMERGENCY MEDICINE

## 2025-07-10 PROCEDURE — 96374 THER/PROPH/DIAG INJ IV PUSH: CPT

## 2025-07-10 RX ORDER — FAMOTIDINE 10 MG/ML
20 INJECTION, SOLUTION INTRAVENOUS ONCE
Status: COMPLETED | OUTPATIENT
Start: 2025-07-10 | End: 2025-07-10

## 2025-07-10 RX ORDER — FAMOTIDINE 20 MG/1
20 TABLET, FILM COATED ORAL 2 TIMES DAILY PRN
Qty: 30 TABLET | Refills: 0 | Status: SHIPPED | OUTPATIENT
Start: 2025-07-10 | End: 2025-08-09

## 2025-07-10 RX ORDER — PANTOPRAZOLE SODIUM 20 MG/1
20 TABLET, DELAYED RELEASE ORAL DAILY
Qty: 30 TABLET | Refills: 0 | Status: SHIPPED | OUTPATIENT
Start: 2025-07-10 | End: 2025-08-09

## 2025-07-10 RX ORDER — ONDANSETRON 4 MG/1
4 TABLET, ORALLY DISINTEGRATING ORAL EVERY 4 HOURS PRN
Qty: 10 TABLET | Refills: 0 | Status: SHIPPED | OUTPATIENT
Start: 2025-07-10 | End: 2025-07-17

## 2025-07-10 RX ORDER — ONDANSETRON 2 MG/ML
4 INJECTION INTRAMUSCULAR; INTRAVENOUS ONCE
Status: COMPLETED | OUTPATIENT
Start: 2025-07-10 | End: 2025-07-10

## 2025-07-10 NOTE — TELEPHONE ENCOUNTER
Received call from patient son Leon who states his mom was discharged from hospital on on June 29th with antibiotic, asking if she needs to continue it as she is having nausea. This RN reviewed patient chart, Levaquin prescribed for 10 days with last dose showing as today, confirmed with son that patient finished prescription, son states she finished first prescription however received a second prescription from another [facility]. In reviewing chart appears two prescriptions were sent, informed Leon that his mom should not take the other antibiotic-antibiotic was for 10 day course. Leon verbalized understanding, also confirmed that she has f/u appointment with her PCP Dr. Murphy.

## 2025-07-10 NOTE — ED INITIAL ASSESSMENT (HPI)
Dced from hospital 7/29 for UTI. Pt c/o vomiting since d/el and weakness starting today. Denies dysuria, denies pain. Afebrile.

## 2025-07-11 NOTE — ED PROVIDER NOTES
Patient Seen in: [facility] Emergency Department    History     Chief Complaint   Patient presents with    Nausea/Vomiting/Diarrhea              HPI        History is provided by patient/independent historian: patient, pt's son  55 year old female with hx of diabetes, , here with c/o ongoing nausea/weakness that started 2 weeks ago. Son reports the weakness was worse today that prompted coming back. She was just recently admitted for pyelonephritis and was told nausea was from that. + hx of hpylori. No changes in BM. No fever.     History reviewed. Past Medical History[1]      History reviewed. Past Surgical History[2]      Home Medications reviewed :  Prescriptions Prior to Admission[3]      History reviewed. Social Hx on file[4]      ROS  Review of Systems   Respiratory:  Negative for shortness of breath.    Cardiovascular:  Negative for chest pain.   Gastrointestinal:  Positive for abdominal pain, nausea and vomiting.   All other systems reviewed and are negative.     All other pertinent organ systems are reviewed and are negative.      Physical Exam     ED Triage Vitals [07/10/25 1816]   BP (!) 154/100   Pulse 103   Resp 16   Temp 98 °F (36.7 °C)   Temp src    SpO2 97 %   O2 Device None (Room air)     Vital signs reviewed.      Physical Exam  Vitals and nursing note reviewed.   Cardiovascular:      Pulses: Normal pulses.   Pulmonary:      Effort: No respiratory distress.   Abdominal:      General: There is no distension.      Tenderness: There is no abdominal tenderness.   Neurological:      Mental Status: She is alert.             ED Course       Labs:     Labs Reviewed   CBC WITH DIFFERENTIAL WITH PLATELET - Abnormal; Notable for the following components:       Result Value    RDW-SD 50.0 (*)     RDW 15.7 (*)     Neutrophil Absolute Prelim 8.16 (*)     Neutrophil Absolute 8.16 (*)     All other components within normal limits   BASIC METABOLIC PANEL (8) - Abnormal; Notable for the following  components:    Glucose 128 (*)     Sodium 146 (*)     Potassium 3.4 (*)     Calculated Osmolality 302 (*)     All other components within normal limits   HEPATIC FUNCTION PANEL (7) - Abnormal; Notable for the following components:    AST 35 (*)     All other components within normal limits         My EKG Interpretation:   As reviewed and Interpreted by me      Imaging Results Available and Reviewed while in ED:   No results found.    Decision rules/scores evaluated: none      Diagnostic labs/tests considered but not ordered: ct abd/pelvis    ED Medications Administered:   Medications   sodium chloride 0.9 % IV bolus 1,000 mL (1,000 mL Intravenous New Bag 7/10/25 1845)   ondansetron (Zofran) 4 MG/2ML injection 4 mg (4 mg Intravenous Given 7/10/25 2007)   pantoprazole (Protonix) 40 mg in sodium chloride 0.9% PF 10 mL IV push (40 mg Intravenous Given 7/10/25 2007)   famotidine (Pepcid) 20 mg/2mL injection 20 mg (20 mg Intravenous Given 7/10/25 2007)            - pt feeling improved - tolerating PO, requesting GI referral    MDM       Medical Decision Making      Differential Diagnosis: After obtaining the patient's history, performing the physical exam and reviewing the diagnostics, multiple initial diagnoses were considered based on the presenting problem including chronic abdominal pain, gastritis, cyclical vomiting, GERD    External document review: I personally reviewed available external medical records for any recent pertinent discharge summaries, testing, and procedures - the findings are as follows: 6/24/25 admission for pyelonephritis    Complicating Factors: The patient already  has a past medical history of Anxiety state, Depression, Diabetes (HCC), History of blood transfusion, Osteoarthritis, and Visual impairment. to contribute to the complexity of this ED evaluation.    Procedures performed: none    Discussed management with physician/appropriate source: none    Considered admission/deescalation of care  for: none    Social determinants of health affecting patient care: none    Prescription medications considered: pepcid, protonix, zofran, discussed continuing current medication regimen    The patient requires continuous monitoring for: vomiting, weakness    Shared decision making: discussed possible admission            Disposition and Plan     Clinical Impression:  1. Acute gastritis without hemorrhage, unspecified gastritis type        Disposition:  Discharge    Follow-up:  Nav Murphy  [address]  [address]  [facility] [address] [phone]    Follow up      Rachel Lynch MD  [address] [facility] [address]  [facility] [address]  [phone]    Follow up        Medications Prescribed:  Current Discharge Medication List        START taking these medications    Details   famotidine (PEPCID) 20 MG Oral Tab Take 1 tablet (20 mg total) by mouth 2 (two) times daily as needed for Heartburn.  Qty: 30 tablet, Refills: 0      pantoprazole 20 MG Oral Tab EC Take 1 tablet (20 mg total) by mouth daily.  Qty: 30 tablet, Refills: 0      !! ondansetron 4 MG Oral Tablet Dispersible Take 1 tablet (4 mg total) by mouth every 4 (four) hours as needed for Nausea.  Qty: 10 tablet, Refills: 0       !! - Potential duplicate medications found. Please discuss with provider.                         [1]   Past Medical History:   Anxiety state    Depression    no longer taking meds     Diabetes (HCC)    History of blood transfusion    25 yrs ago;no reactions    Osteoarthritis    Visual impairment    eyeglasses   [2]   Past Surgical History:  Procedure Laterality Date          Cholecystectomy      Total hip replacement Left     Total knee replacement Right 2019    Total knee replacement Left 2020   [3] (Not in a hospital admission)   [4]   Social History  Socioeconomic History    Marital status:    Tobacco Use    Smoking status: Never    Smokeless tobacco: Never   Vaping Use    Vaping status: Never Used    Substance and Sexual Activity    Alcohol use: No    Drug use: No

## 2025-08-08 ENCOUNTER — OFFICE VISIT (OUTPATIENT)
Facility: CLINIC | Age: 55
End: 2025-08-08

## 2025-08-08 VITALS
WEIGHT: 249 LBS | HEART RATE: 97 BPM | HEIGHT: 62 IN | BODY MASS INDEX: 45.82 KG/M2 | DIASTOLIC BLOOD PRESSURE: 80 MMHG | SYSTOLIC BLOOD PRESSURE: 124 MMHG

## 2025-08-08 DIAGNOSIS — R10.13 EPIGASTRIC PAIN: ICD-10-CM

## 2025-08-08 DIAGNOSIS — K59.00 CONSTIPATION, UNSPECIFIED CONSTIPATION TYPE: ICD-10-CM

## 2025-08-08 DIAGNOSIS — R12 HEART BURN: ICD-10-CM

## 2025-08-08 DIAGNOSIS — K29.50 CHRONIC GASTRITIS WITHOUT BLEEDING, UNSPECIFIED GASTRITIS TYPE: Primary | ICD-10-CM

## 2025-08-08 RX ORDER — POLYETHYLENE GLYCOL 3350 17 G/17G
17 POWDER, FOR SOLUTION ORAL DAILY
Qty: 1530 G | Refills: 3 | Status: SHIPPED | OUTPATIENT
Start: 2025-08-08

## 2025-08-08 RX ORDER — POLYETHYLENE GLYCOL 3350 17 G/17G
17 POWDER, FOR SOLUTION ORAL DAILY
Qty: 238 G | Refills: 0 | Status: SHIPPED | OUTPATIENT
Start: 2025-08-08 | End: 2025-08-08 | Stop reason: CLARIF

## 2025-08-20 ENCOUNTER — LAB ENCOUNTER (OUTPATIENT)
Dept: LAB | Facility: HOSPITAL | Age: 55
End: 2025-08-20

## 2025-08-20 DIAGNOSIS — R10.13 EPIGASTRIC PAIN: ICD-10-CM

## 2025-08-20 DIAGNOSIS — R12 HEART BURN: ICD-10-CM

## 2025-08-20 DIAGNOSIS — K29.50 CHRONIC GASTRITIS WITHOUT BLEEDING, UNSPECIFIED GASTRITIS TYPE: ICD-10-CM

## 2025-08-20 PROCEDURE — 83013 H PYLORI (C-13) BREATH: CPT

## 2025-08-21 LAB — H PYLORI BREATH TEST: NEGATIVE

## (undated) DEVICE — DRAPE SHEET LG

## (undated) DEVICE — SKIN AFFIX .4ML

## (undated) DEVICE — GAMMEX® PI HYBRID SIZE 6.5, STERILE POWDER-FREE SURGICAL GLOVE, POLYISOPRENE AND NEOPRENE BLEND: Brand: GAMMEX

## (undated) DEVICE — CONTAINER SPEC STR 4OZ GRY LID

## (undated) DEVICE — Device: Brand: JELCO

## (undated) DEVICE — Device: Brand: STABLECUT®

## (undated) DEVICE — ENCORE® LATEX MICRO SIZE 6.5, STERILE LATEX POWDER-FREE SURGICAL GLOVE: Brand: ENCORE

## (undated) DEVICE — TOTAL KNEE: Brand: MEDLINE INDUSTRIES, INC.

## (undated) DEVICE — GAMMEX® NON-LATEX PI ORTHO SIZE 8.5, STERILE POLYISOPRENE POWDER-FREE SURGICAL GLOVE: Brand: GAMMEX

## (undated) DEVICE — SUTURE PDS II 1 CTX

## (undated) DEVICE — 2T11 #2 PDO 36 X 36: Brand: 2T11 #2 PDO 36 X 36

## (undated) DEVICE — IMPERVIOUS STOCKINETTE: Brand: DEROYAL

## (undated) DEVICE — SUTURE MONOCRYL 2-0 Y945H

## (undated) DEVICE — GAUZE SPONGES,12 PLY: Brand: CURITY

## (undated) DEVICE — PADDING 4YDX6IN CTTN STRL WBRL

## (undated) DEVICE — SOL  .9 3000ML

## (undated) DEVICE — ADH DRMBND PRINEO SKN CLOS TOP

## (undated) DEVICE — TRAY FOLEY BDX 16F STATLOCK

## (undated) DEVICE — TIBURON HIP DRAPE WITH POUCHES: Brand: CONVERTORS

## (undated) DEVICE — TRAY SKIN PREP PVP-1

## (undated) DEVICE — SOL  .9 1000ML BTL

## (undated) DEVICE — SUTURE ETHILON 3-0 669H

## (undated) DEVICE — POLAR CARE CUBE COOLING UNIT

## (undated) DEVICE — 2T8 #2 PDO 24 X 24: Brand: 2T8 #2 PDO 24 X 24

## (undated) DEVICE — BOWL CEMENT MIX QUICK-VAC

## (undated) DEVICE — 60 ML SYRINGE LUER-LOCK TIP: Brand: MONOJECT

## (undated) DEVICE — PAD THRP 16IN WRPON MU LNG STM

## (undated) DEVICE — ENCORE® LATEX MICRO SIZE 8.5, STERILE LATEX POWDER-FREE SURGICAL GLOVE: Brand: ENCORE

## (undated) DEVICE — GAMMEX® PI HYBRID SIZE 8.5, STERILE POWDER-FREE SURGICAL GLOVE, POLYISOPRENE AND NEOPRENE BLEND: Brand: GAMMEX

## (undated) DEVICE — SOLUTION SURG DURA PREP HAZMAT

## (undated) DEVICE — COTTON UNDERCAST PADDING,REGULAR FINISH: Brand: WEBRIL

## (undated) DEVICE — ZIMMER® STERILE DISPOSABLE TOURNIQUET CUFF WITH PLC, DUAL PORT, SINGLE BLADDER, 42 IN. (107 CM)

## (undated) DEVICE — NON-ADHERENT PADS PREPACK: Brand: TELFA

## (undated) DEVICE — 3M™ TEGADERM™ TRANSPARENT FILM DRESSING, 1626W, 4 IN X 4-3/4 IN (10 CM X 12 CM), 50 EACH/CARTON, 4 CARTON/CASE: Brand: 3M™ TEGADERM™

## (undated) DEVICE — BLADE SAW SAGITTAL 19.5

## (undated) DEVICE — BATTERY

## (undated) DEVICE — T5 HOOD WITH PEEL AWAY FACE SHIELD

## (undated) DEVICE — Device

## (undated) DEVICE — 33MM

## (undated) DEVICE — PACK CDS TOTAL HIP

## (undated) DEVICE — GAMMEX® NON-LATEX PI ORTHO SIZE 9, STERILE POLYISOPRENE POWDER-FREE SURGICAL GLOVE: Brand: GAMMEX

## (undated) DEVICE — DUAL CUT SAGITTAL BLADE

## (undated) DEVICE — HOOD: Brand: FLYTE

## (undated) DEVICE — PILLOW FX 56X38X15CM MED HIP

## (undated) DEVICE — SUTURE ETHIBOND 2 V-37

## (undated) DEVICE — GAMMEX® PI HYBRID SIZE 9, STERILE POWDER-FREE SURGICAL GLOVE, POLYISOPRENE AND NEOPRENE BLEND: Brand: GAMMEX

## (undated) DEVICE — 3M™ IOBAN™ 2 ANTIMICROBIAL INCISE DRAPE 6650EZ: Brand: IOBAN™ 2

## (undated) DEVICE — CATH SECURING DEVICE STATLOCK

## (undated) DEVICE — NEEDLE HPO 18GA 1.5IN ECLPS

## (undated) DEVICE — 3M(TM) TEGADERM(TM) TRANSPARENT FILM DRESSING FRAME STYLE 1628: Brand: 3M™ TEGADERM™

## (undated) DEVICE — 50MM

## (undated) DEVICE — 33MM MG 2.5 HEX HEAD PIN-DJ

## (undated) DEVICE — GAMMEX® NON-LATEX PI ORTHO SIZE 6.5, STERILE POLYISOPRENE POWDER-FREE SURGICAL GLOVE: Brand: GAMMEX

## (undated) DEVICE — SHEET,DRAPE,70X100,STERILE: Brand: MEDLINE

## (undated) DEVICE — 50MM MG 2.5 HEX HEAD PIN-DJ

## (undated) DEVICE — 20 ML SYRINGE LUER-LOCK TIP: Brand: MONOJECT

## (undated) DEVICE — PEN: MARKING STD PT 100/CS: Brand: MEDICAL ACTION INDUSTRIES

## (undated) DEVICE — DRAPE SRG 70X60IN SPLT U IMPRV

## (undated) DEVICE — 2T9 -0- UNDYED MONODERM 36X36: Brand: 2T9 -0- UNDYED MONODERM 36X36

## (undated) DEVICE — SUTURE MONOCRYL 2-0 SH

## (undated) NOTE — LETTER
Date & Time: 6/24/2025, 8:00 PM  Patient: Navin Pardo  Encounter Provider(s):    Simone Bertrand MD       To Whom It May Concern:    Navin Pardo was seen and treated in our department, accompanied by family, on 6/24/2025.     If you have any questions or concerns, please do not hesitate to call.        _____________________________  Physician/APC Signature